# Patient Record
Sex: FEMALE | Race: WHITE | NOT HISPANIC OR LATINO | Employment: PART TIME | URBAN - METROPOLITAN AREA
[De-identification: names, ages, dates, MRNs, and addresses within clinical notes are randomized per-mention and may not be internally consistent; named-entity substitution may affect disease eponyms.]

---
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
## 2018-01-02 DIAGNOSIS — F41.1 GAD (GENERALIZED ANXIETY DISORDER): ICD-10-CM

## 2018-01-15 ENCOUNTER — OFFICE VISIT (OUTPATIENT)
Dept: PEDIATRICS | Facility: OTHER | Age: 18
End: 2018-01-15
Payer: MEDICAID

## 2018-01-15 VITALS
RESPIRATION RATE: 18 BRPM | HEART RATE: 94 BPM | SYSTOLIC BLOOD PRESSURE: 98 MMHG | WEIGHT: 144.5 LBS | HEIGHT: 64 IN | DIASTOLIC BLOOD PRESSURE: 52 MMHG | TEMPERATURE: 98.2 F | BODY MASS INDEX: 24.67 KG/M2

## 2018-01-15 DIAGNOSIS — R41.89 COGNITIVE IMPAIRMENT: ICD-10-CM

## 2018-01-15 DIAGNOSIS — F41.1 GAD (GENERALIZED ANXIETY DISORDER): Primary | ICD-10-CM

## 2018-01-15 DIAGNOSIS — F84.0 AUTISM SPECTRUM DISORDER: ICD-10-CM

## 2018-01-15 PROCEDURE — 99214 OFFICE O/P EST MOD 30 MIN: CPT | Performed by: PEDIATRICS

## 2018-01-15 RX ORDER — FLUOXETINE 10 MG/1
CAPSULE ORAL
Qty: 90 CAPSULE | Refills: 0 | Status: SHIPPED | OUTPATIENT
Start: 2018-01-15 | End: 2018-04-09

## 2018-01-15 RX ORDER — DOCUSATE SODIUM 100 MG/1
CAPSULE, LIQUID FILLED ORAL
COMMUNITY
Start: 2017-12-28 | End: 2019-04-03

## 2018-01-15 ASSESSMENT — PATIENT HEALTH QUESTIONNAIRE - PHQ9
SUM OF ALL RESPONSES TO PHQ QUESTIONS 1-9: 0
10. IF YOU CHECKED OFF ANY PROBLEMS, HOW DIFFICULT HAVE THESE PROBLEMS MADE IT FOR YOU TO DO YOUR WORK, TAKE CARE OF THINGS AT HOME, OR GET ALONG WITH OTHER PEOPLE: NOT DIFFICULT AT ALL
SUM OF ALL RESPONSES TO PHQ QUESTIONS 1-9: 0

## 2018-01-15 ASSESSMENT — ANXIETY QUESTIONNAIRES
3. WORRYING TOO MUCH ABOUT DIFFERENT THINGS: NEARLY EVERY DAY
5. BEING SO RESTLESS THAT IT IS HARD TO SIT STILL: NOT AT ALL
7. FEELING AFRAID AS IF SOMETHING AWFUL MIGHT HAPPEN: SEVERAL DAYS
GAD7 TOTAL SCORE: 12
1. FEELING NERVOUS, ANXIOUS, OR ON EDGE: NEARLY EVERY DAY
GAD7 TOTAL SCORE: 12
6. BECOMING EASILY ANNOYED OR IRRITABLE: SEVERAL DAYS
2. NOT BEING ABLE TO STOP OR CONTROL WORRYING: NEARLY EVERY DAY
GAD7 TOTAL SCORE: 12
4. TROUBLE RELAXING: SEVERAL DAYS
7. FEELING AFRAID AS IF SOMETHING AWFUL MIGHT HAPPEN: SEVERAL DAYS

## 2018-01-15 ASSESSMENT — PAIN SCALES - GENERAL: PAINLEVEL: NO PAIN (0)

## 2018-01-15 NOTE — PROGRESS NOTES
SUBJECTIVE:                                                      HPI:  Maren is a 17 year old female with autism with who presents to clinic today for follow-up of anxiety. Last visit: 9/22/17.     Overall, mom feels that Maren is doing well. Teachers have no specific concerns. She still struggles with excess worry. As soon as she gets on van to come home from school, there is almost constant texting to mom regarding the details for the evening including what is for dinner. She has an excessive hand washing. Sleeps well. No concern for depression.  Current behavioral therapy: none, looking for a provider closer than [place] and available in evenings.  Current coping strategies: coloring, beading, dancing in the house, DAPE and using tredmill.     Medication: Prozac (fluoxetine) 20 mg  Date medication first prescribed: 6/9/15. Tried weaning Prozac (fluoxetine)  10 mg over a month during the summer. Restarted due to moodiness and irritability. She was easily upset over little things. Restarted 10 mg, increased to 15 then to 20 mg 9/17.  Medication problems/concerns: none      ROS: Negative for constitutional, eye, ear, nose, throat, skin, respiratory, cardiac, and gastrointestinal other than those outlined in the HPI.    PROBLEM LIST:  Patient Active Problem List    Diagnosis Date Noted     Chronic rhinitis 11/12/2016     Priority: Medium     VANNESSA (generalized anxiety disorder) 07/09/2016     Priority: Medium     Constipation, unspecified constipation type 12/10/2015     Priority: Medium     Gross motor delay 04/06/2015     Priority: Medium     Cognitive impairment 07/06/2012     Priority: Medium     Menorrhagia 07/06/2012     Priority: Medium     Autism 02/28/2012     Priority: Medium      MEDICATIONS:  Current Outpatient Prescriptions   Medication Sig Dispense Refill      MG capsule        FLUoxetine (PROZAC) 20 MG capsule Take 1 capsule (20 mg) by mouth daily 14 capsule 0     FLUoxetine (PROZAC) 10 MG  "tablet Take 1 tab daily x 3 weeks. May decrease to 1/2 tab daily x 2 weeks, then 1/2 tab every other day until gone 35 tablet 0     desogestrel-ethinyl estradiol (APRI) 0.15-30 MG-MCG per tablet Take 1 tablet by mouth daily Skip placebo week except for every 3rd month 84 tablet 3     fluticasone (FLONASE) 50 MCG/ACT spray Spray 1-2 sprays into both nostrils daily 16 g 6     polyethylene glycol (MIRALAX) powder Take 17 g (1 capful) by mouth daily 527 g 11     docusate sodium (COLACE) 100 MG tablet Take 100 mg by mouth daily 90 tablet 3     Multiple Vitamins-Minerals (MULTIVITAL PO) Take  by mouth.        ALLERGIES:  No Known Allergies    Problem list and histories reviewed & adjusted, as indicated.    OBJECTIVE:                                                      BP 98/52  Pulse 94  Temp 98.2  F (36.8  C) (Temporal)  Resp 18  Ht 5' 4.37\" (1.635 m)  Wt 144 lb 8 oz (65.5 kg)  BMI 24.52 kg/m2  Physical Exam:  Appearance: in no apparent distress, well developed and well nourished, alert.  Heart: S1, S2 normal, no murmur, no gallop, rate regular.  Lungs: no retractions, clear to auscultation.   Skin: No cutting or lesions.    VANNESSA-7 SCORE 9/22/2017 1/15/2018   Total Score  (minimal anxiety) 12 (moderate anxiety)   Total Score - 12       PHQ-9 SCORE 9/22/2017 1/15/2018   Total Score MyChart 0 0   Total Score 0 0       ASSESSMENT/PLAN:     1. VANNESSA (generalized anxiety disorder)    2. Autism spectrum disorder    3. Cognitive impairment        Recommend therapy for skill building and emotion regulation skills. Suggested providers per Patient Instructions.    Will increase Prozac (fluoxetine) from 20 to 30 mg.    Reviewed importance of coping skills. Maren is able to name several adaptive coping skills including exercise, beading, coloring, witting in a journal, and listening to music.    Strongly encourage aerobic exercise.   Recheck in clinic in 3 months, sooner with concerns.       Patient's parent expresses " understanding and agreement with the plan and has no further questions.    Electronically signed by Leidy Carrizales MD.    Answers for HPI/ROS submitted by the patient on 1/15/2018   If you checked off any problems, how difficult have these problems made it for you to do your work, take care of things at home, or get along with other people?: Not difficult at all  PHQ9 TOTAL SCORE: 0  VANNESSA 7 TOTAL SCORE: 12

## 2018-01-15 NOTE — MR AVS SNAPSHOT
After Visit Summary   1/15/2018    Maren Winchester    MRN: [id number]           Patient Information     Date Of Birth          2000        Visit Information        Provider Department      1/15/2018 1:50 PM Leidy Carrizales MD [facility]        Today's Diagnoses     VANNESSA (generalized anxiety disorder)    -  1      Care Instructions    Recommendations in caring for Maren:    1. Recommend therapy for skill building and emotion regulation skills. Suggested providers per Patient Instructions.    2. Will increase Prozac (fluoxetine) from 20 to 30 mg.    3. Reviewed importance of coping skills. Maren is able to name several adaptive coping skills including exercise, beading, coloring, witting in a journal, and listening to music.    4. Recheck in clinic in 3 months, sooner with concerns.         [facility]- [phone]  [facility]) at [phone]  [facility]) - [phone]  [facility]  ([place]) - [phone]  [facility] ([place]) - [phone]   [facility]) - [phone]              Follow-ups after your visit        Who to contact     If you have questions or need follow up information about today's clinic visit or your schedule please contact [facility] directly at [phone].  Normal or non-critical lab and imaging results will be communicated to you by MyChart, letter or phone within 4 business days after the clinic has received the results. If you do not hear from us within 7 days, please contact the clinic through MyChart or phone. If you have a critical or abnormal lab result, we will notify you by phone as soon as possible.  Submit refill requests through Vingle or call your pharmacy and they will forward the refill request to us. Please allow 3 business days for your refill to be completed.          Additional Information About  "Your Visit        [employer] Pharm[employer] Information     L[employer] gives you secure access to your electronic health record. If you see a primary care provider, you can also send messages to your care team and make appointments. If you have questions, please call your primary care clinic.  If you do not have a primary care provider, please call [phone] and they will assist you.        Care EveryWhere ID     This is your Care EveryWhere ID. This could be used by other organizations to access your [place] medical records  [id number]        Your Vitals Were     Pulse Temperature Respirations Height BMI (Body Mass Index)       94 98.2  F (36.8  C) (Temporal) 18 5' 4.37\" (1.635 m) 24.52 kg/m2        Blood Pressure from Last 3 Encounters:   01/15/18 98/52   09/22/17 102/70   05/10/17 116/78    Weight from Last 3 Encounters:   01/15/18 144 lb 8 oz (65.5 kg) (76 %)*   09/22/17 145 lb (65.8 kg) (77 %)*   05/10/17 146 lb 8 oz (66.5 kg) (80 %)*     * Growth percentiles are based on [facility] 2-20 Years data.              Today, you had the following     No orders found for display         Today's Medication Changes          These changes are accurate as of: 1/15/18  2:33 PM.  If you have any questions, ask your nurse or doctor.               Start taking these medicines.        Dose/Directions    * FLUoxetine 20 MG capsule   Commonly known as:  PROzac   Used for:  VANNESSA (generalized anxiety disorder)   Started by:  Leidy Carrizales MD        Take 10 plus 20 for 30 mg daily   Quantity:  90 capsule   Refills:  0       * FLUoxetine 10 MG capsule   Commonly known as:  PROzac   Used for:  VANNESSA (generalized anxiety disorder)   Started by:  Leidy Carrizales MD        Take 10 plus 20 for 30 mg daily   Quantity:  90 capsule   Refills:  0       * Notice:  This list has 2 medication(s) that are the same as other medications prescribed for you. Read the directions carefully, and ask your doctor or other care provider to review them with you.         Where to " get your medicines      These medications were sent to [facility], [place] - [address]   [address] , [place] [address]     Phone:  [phone]     FLUoxetine 10 MG capsule    FLUoxetine 20 MG capsule                Primary Care Provider Office Phone # Fax #    Leidy Carrizales [phone]       [address]  [place] [address]        Equal Access to Services     [facility] : Hadii aad ku hadasho Soomaali, waaxda luqadaha, qaybta kaalmada adeegyada, waxay idiin hayaan adeeg khmagdalenash lafrancisco . So [facility] [phone].    ATENCIÓN: Si habla español, tiene a terry disposición servicios gratuitos de asistencia lingüística. [facility] [phone].    We comply with applicable federal civil rights laws and [place] laws. We do not discriminate on the basis of race, color, national origin, age, disability, sex, sexual orientation, or gender identity.            Thank you!     Thank you for choosing [facility]  for your care. Our goal is always to provide you with excellent care. Hearing back from our patients is one way we can continue to improve our services. Please take a few minutes to complete the written survey that you may receive in the mail after your visit with us. Thank you!             Your Updated Medication List - Protect others around you: Learn how to safely use, store and throw away your medicines at www.disposemymeds.org.          This list is accurate as of: 1/15/18  2:33 PM.  Always use your most recent med list.                   Brand Name Dispense Instructions for use Diagnosis    desogestrel-ethinyl estradiol 0.15-30 MG-MCG per tablet    APRI    84 tablet    Take 1 tablet by mouth daily Skip placebo week except for every 3rd month    Menorrhagia with regular cycle       * docusate sodium 100 MG tablet    COLACE    90 tablet    Take 100 mg by mouth daily    Constipation, unspecified constipation type       *  MG capsule    Generic drug:  docusate sodium           * FLUoxetine 20 MG capsule    PROzac    90 capsule    Take 10 plus 20 for 30 mg daily    VANNESSA (generalized anxiety disorder)       * FLUoxetine 10 MG capsule    PROzac    90 capsule    Take 10 plus 20 for 30 mg daily    VANNESSA (generalized anxiety disorder)       fluticasone 50 MCG/ACT spray    FLONASE    16 g    Spray 1-2 sprays into both nostrils daily    Snoring       MULTIVITAL PO      Take  by mouth.        polyethylene glycol powder    MIRALAX    527 g    Take 17 g (1 capful) by mouth daily    Constipation, unspecified constipation type       * Notice:  This list has 4 medication(s) that are the same as other medications prescribed for you. Read the directions carefully, and ask your doctor or other care provider to review them with you.

## 2018-01-15 NOTE — PATIENT INSTRUCTIONS
Recommendations in caring for Maren:    1. Recommend therapy for skill building and emotion regulation skills. Suggested providers per Patient Instructions.    2. Will increase Prozac (fluoxetine) from 20 to 30 mg.    3. Reviewed importance of coping skills. Maren is able to name several adaptive coping skills including exercise, beading, coloring, witting in a journal, and listening to music.    4. Recheck in clinic in 3 months, sooner with concerns.         [facility]- [phone]  [facility]) at [phone]  [facility]) - [phone]  [facility]  ([place]) - [phone]  [facility]) - [phone]   [facility]) - [phone]

## 2018-01-16 PROBLEM — F84.0 AUTISM SPECTRUM DISORDER: Status: ACTIVE | Noted: 2018-01-16

## 2018-01-16 ASSESSMENT — ANXIETY QUESTIONNAIRES: GAD7 TOTAL SCORE: 12

## 2018-01-16 ASSESSMENT — PATIENT HEALTH QUESTIONNAIRE - PHQ9: SUM OF ALL RESPONSES TO PHQ QUESTIONS 1-9: 0

## 2018-02-12 DIAGNOSIS — N92.0 MENORRHAGIA WITH REGULAR CYCLE: ICD-10-CM

## 2018-02-12 RX ORDER — DESOGESTREL AND ETHINYL ESTRADIOL 0.15-0.03
1 KIT ORAL DAILY
Qty: 84 TABLET | Refills: 3 | Status: SHIPPED | OUTPATIENT
Start: 2018-02-12 | End: 2018-10-22

## 2018-02-12 NOTE — TELEPHONE ENCOUNTER
"Requested Prescriptions   Pending Prescriptions Disp Refills     desogestrel-ethinyl estradiol (APRI) 0.15-30 MG-MCG per tablet 84 tablet 3     Sig: Take 1 tablet by mouth daily Skip placebo week except for every 3rd month    Contraceptives Protocol Passed    2/12/2018 10:06 AM       Passed - Patient is not a current smoker if age is 35 or older       Passed - Recent or future visit with authorizing provider's specialty    Patient had office visit in the last year or has a visit in the next 30 days with authorizing provider.  See \"Patient Info\" tab in inbasket, or \"Choose Columns\" in Meds & Orders section of the refill encounter.            Passed - No active pregnancy on record       Passed - No positive pregnancy test in past 12 months          Last Written Prescription Date:  5/10/17  Last Fill Quantity: 84,  # refills: 3   Last Office Visit with [facility], P or [facility] prescribing provider:  9-22-17   Future Office Visit:       "

## 2018-02-13 ENCOUNTER — OFFICE VISIT (OUTPATIENT)
Dept: PSYCHOLOGY | Facility: CLINIC | Age: 18
End: 2018-02-13
Payer: MEDICAID

## 2018-02-13 DIAGNOSIS — F41.1 GAD (GENERALIZED ANXIETY DISORDER): Primary | ICD-10-CM

## 2018-02-13 PROCEDURE — 90834 PSYTX W PT 45 MINUTES: CPT | Performed by: MARRIAGE & FAMILY THERAPIST

## 2018-02-13 NOTE — MR AVS SNAPSHOT
MRN:[id number]                      After Visit Summary   2/13/2018    Maren Winchester    MRN: [id number]           Visit Information        Provider Department      2/13/2018 2:00 PM Asale Bermeo, [facility]      Your next 10 appointments already scheduled     Mar 05, 2018  2:00 PM CST   Return Visit with Asael Bermeo [address] [address]   [phone]            Mar 12, 2018  4:00 PM CDT   Return Visit with Asael Bermeo [address] [id number] [phone]            Mar 19, 2018  4:00 PM CDT   Return Visit with Asael Bermeo [address] [id number] [phone]            Mar 26, 2018  4:00 PM CDT   Return Visit with Asael Bermeo [address] [id number] [phone]              MyChart Information     [employer] gives you secure access to your electronic health record. If you see a primary care provider, you can also send messages to your care team and make appointments. If you have questions, please call your primary care clinic.  If you do not have a primary care provider, please call [phone] and they will assist you.        Care EveryWhere ID     This is your Care EveryWhere ID. This could be used by other organizations to access your [facility] medical records  [id number]        Equal Access to Services     LUDWIN HEBERT : Shahana Simental, wacristobal mary, qapeter kaalsheila esquivel, baljit garcia. [facility] [phone].    ATENCIÓN: Si frances martel terry  disposición servicios gratuitos de asistencia lingüística. Llame al [phone].    We comply with applicable federal civil rights laws and [place] laws. We do not discriminate on the basis of race, color, national origin, age, disability, sex, sexual orientation, or gender identity.

## 2018-02-25 ASSESSMENT — ANXIETY QUESTIONNAIRES
5. BEING SO RESTLESS THAT IT IS HARD TO SIT STILL: NOT AT ALL
7. FEELING AFRAID AS IF SOMETHING AWFUL MIGHT HAPPEN: SEVERAL DAYS
2. NOT BEING ABLE TO STOP OR CONTROL WORRYING: NEARLY EVERY DAY
IF YOU CHECKED OFF ANY PROBLEMS ON THIS QUESTIONNAIRE, HOW DIFFICULT HAVE THESE PROBLEMS MADE IT FOR YOU TO DO YOUR WORK, TAKE CARE OF THINGS AT HOME, OR GET ALONG WITH OTHER PEOPLE: VERY DIFFICULT
6. BECOMING EASILY ANNOYED OR IRRITABLE: SEVERAL DAYS
1. FEELING NERVOUS, ANXIOUS, OR ON EDGE: NEARLY EVERY DAY
3. WORRYING TOO MUCH ABOUT DIFFERENT THINGS: NEARLY EVERY DAY
GAD7 TOTAL SCORE: 12

## 2018-02-25 ASSESSMENT — PATIENT HEALTH QUESTIONNAIRE - PHQ9: 5. POOR APPETITE OR OVEREATING: SEVERAL DAYS

## 2018-02-26 ASSESSMENT — ANXIETY QUESTIONNAIRES: GAD7 TOTAL SCORE: 12

## 2018-02-26 ASSESSMENT — PATIENT HEALTH QUESTIONNAIRE - PHQ9: SUM OF ALL RESPONSES TO PHQ QUESTIONS 1-9: 0

## 2018-02-26 NOTE — PROGRESS NOTES
Progress Note - Initial Session    Client Name:  Maren Winchester Date: 2/13/18         Service Type: Individual      Session Start Time: 2:10 pm  Session End Time: 3pm      Session Length: 38 - 52      Session #: 1     Attendees: Client and Mother         Diagnostic Assessment in progress.        Mental Status Assessment:  Appearance:   Appropriate   Eye Contact:   Poor  Psychomotor Behavior: Retarded (Slowed)   Attitude:   Guarded   Orientation:   All  Speech   Rate / Production: Monotone  Pressured    Volume:  Loud   Mood:    Anxious  Normal  Affect:    Constricted  Labile   Thought Content:  Clear  Perservative   Thought Form:  Coherent  Logical  Obsessive  Tangential   Insight:    Fair       Safety Issues and Plan for Safety and Risk Management:  Client denies current fears or concerns for personal safety.  Client denies current or recent suicidal ideation or behaviors.  Client denies current or recent homicidal ideation or behaviors.  Client denies current or recent self injurious behavior or ideation.  Client denies other safety concerns.  A safety and risk management plan has not been developed at this time, however client was given the after-hours number / 911 should there be a change in any of these risk factors.  Client reports there are firearms in the house. The firearms are secured in a locked space.      Diagnostic Criteria:  A. Excessive anxiety and worry about a number of events or activities (such as work or school performance).   B. The person finds it difficult to control the worry.  C. Select 3 or more symptoms (required for diagnosis). Only one item is required in children.   - Restlessness or feeling keyed up or on edge.    - Being easily fatigued.    - Difficulty concentrating or mind going blank.    - Irritability.    - Muscle tension.    - Sleep disturbance (difficulty falling or staying asleep, or restless unsatisfying sleep).   D. The focus of the anxiety and worry is not  confined to features of an Axis I disorder.  E. The anxiety, worry, or physical symptoms cause clinically significant distress or impairment in social, occupational, or other important areas of functioning.   F. The disturbance is not due to the direct physiological effects of a substance (e.g., a drug of abuse, a medication) or a general medical condition (e.g., hyperthyroidism) and does not occur exclusively during a Mood Disorder, a Psychotic Disorder, or a Pervasive Developmental Disorder.        DSM5 Diagnoses: (Sustained by DSM5 Criteria Listed Above)  Diagnoses: 300.02 (F41.1) Generalized Anxiety Disorder  Psychosocial & Contextual Factors: Diagnosed with autism at age 4. Mom is legal guardian. Anxiety and OCD issues.   WHODAS 2.0 (12 item)            This questionnaire asks about difficulties due to health conditions. Health conditions  include  disease or illnesses, other health problems that may be short or long lasting,  injuries, mental health or emotional problems, and problems with alcohol or drugs.                     Think back over the past 30 days and answer these questions, thinking about how much  difficulty you had doing the following activities. For each question, please [place] only  one response.    S1 Standing for long periods such as 30 minutes? None =         1   S2 Taking care of household responsibilities? None =         1   S3 Learning a new task, for example, learning how to get to a new place? Mild =           2   S4 How much of a problem do you have joining community activities (for example, festivals, Evangelical or other activities) in the same way as anyone else can? Mild =           2   S5 How much have you been emotionally affected by your health problems? Moderate =   3     In the past 30 days, how much difficulty did you have in:   S6 Concentrating on doing something for ten minutes? None =         1   S7 Walking a long distance such as a kilometer (or equivalent)? Mild =            2   S8 Washing your whole body? None =         1   S9 Getting dressed? None =         1   S10 Dealing with people you do not know? Moderate =   3   S11 Maintaining a friendship? None =         1   S12 Your day to day work? Mild =           2     H1 Overall, in the past 30 days, how many days were these difficulties present? Record number of days 30   H2 In the past 30 days, for how many days were you totally unable to carry out your usual activities or work because of any health condition? Record number of days  0   H3 In the past 30 days, not counting the days that you were totally unable, for how many days did you cut back or reduce your usual activities or work because of any health condition? Record number of days 0       Collateral Reports Completed:  Not Applicable      PLAN: (Homework, other):  Client stated that she may follow up for ongoing services with [facility].        CHAPITO Blankenship

## 2018-03-12 ENCOUNTER — OFFICE VISIT (OUTPATIENT)
Dept: PSYCHOLOGY | Facility: CLINIC | Age: 18
End: 2018-03-12
Payer: MEDICAID

## 2018-03-12 DIAGNOSIS — F41.1 GAD (GENERALIZED ANXIETY DISORDER): Primary | ICD-10-CM

## 2018-03-12 PROCEDURE — 90791 PSYCH DIAGNOSTIC EVALUATION: CPT | Performed by: MARRIAGE & FAMILY THERAPIST

## 2018-03-12 NOTE — Clinical Note
Hello, I will be working with this patient to address concerns of anxiety and obsessive thoughts. Let me know if you have questions or concerns. Thanks,

## 2018-03-12 NOTE — PROGRESS NOTES
Child / Adolescent Structured Interview  Standard Diagnostic Assessment    CLIENT'S NAME: Maren Winchester  MRN:   [id number]  :   2000  ACCT. NUMBER: [id number]  DATE OF SERVICE:  18  and 3/12/18      Identifying Information:  Client is a 17 year old,  female. Client was referred to therapy by physician. Client is currently a student.  This initial session included the client's mother. The client was present in the initial session.  There are no language or communication issues or need for modification in treatment. There are no ethnic, cultural or Jain factors that may be relevant for therapy. Client identified their preferred language to be English. Client does not need the assistance of an  or other support involved in therapy.      Client and Parent's Statements of Presenting Concern:  Client's mother reported the following reason(s) for seeking therapy: anxiety issues--obsessed with what's for dinner--likes finger foods, and lets old memories get her down. Client has autism spectrum disorder.   Client reported the reason for seeking therapy as anxiety.  her symptoms have resulted in the following functional impairments: academic performance, educational activities, management of the household and or completion of tasks, organization, relationship(s), self-care and social interactions      History of Presenting Concern:  The mother reports these concerns began 2 years ago.  Issues contributing to the current problem include: parent's divorce, minimal co-parenting relationship and bullying.  Client has attempted to resolve these concerns in the past through med increase. Client reports that other professional(s) are not involved in providing support services at this time.      Family and Social History:  Client grew up in [place], [place].  Parents  when the client was 3 years old. The client's mother did remarry 3 years ago  The client's father did not remarry and remains single. The client lives with mom and step-dad. The client has 2 siblings, includin brother(s) ages 15 and 1 sister(s) ages 20. They noted that they were the second born. The client's living situation appears to be stable, as evidenced by mom's report.  Client described her current relationships with family of origin as good.  There are no apparent family relationship issues.  The biological mother report the child shows affection by hugs.   Parent describes discipline used as taking things away.  Client describes discipline used as grounded.   The mother reports hours per week their child spends in the following:  Computer, smart phone or video games: too much TV: none The family uses blocking devices for computer, TV, or internet: YES.  How is electronics use monitored?  By mom. Other information reported by parent/child: n/a There are no identified legal issues. The biological mother has full legal custody and has full physical custody.      Developmental History:  There were no reported complications during pregnanacy or birth. There were no major childhood illnesses.  The caregiver reported that the client experienced significant delays in developmental tasks, such as diagnosed with autism age 4. . There is a significant history of separation from primary caregiver(s). Dad left age 3. There is not a history of trauma, loss or abuse. There are no reported problems with sleep.  There are no concerns about sexual development or acitivity. Client is not sexually active.      School Information:  The client currently attends school at [employer], and is in the 12 grade. There is a history of grade retention or special educational services. Particpation in special education services includes: IEP. There is not a history of ADHD symptoms. There is not a history of learning disorders. Academic performance is below grade level. There are no attendance issues. Client  identified few stable and meaningful social connections.  Peer relationships are problematic not many friends.      Mental Health History:  There is not reported family history of mental issues / treatment.    Client is not currently receiving any mental health services.  Client has received the following mental health services in the past: no prior services.  Hospitalizations: None.       Chemical Health History:  There is no reported family history of chemical health issues / treatment.    The client has the following history of chemical health issues / treatment: n/a.      The Kiddie-Cage score was 0    There are no recommendations for follow-up based on this score    Client's response to recommendations:  Not Applicable    Psychological and Social History Assessment / Questionnaire:  Over the past 2 weeks, mother reports their child had problems with the following:     Review of Symptoms:  Depression: No symptoms  Ramona:  No Symptoms  Psychosis: No Symptoms  Anxiety: Excessive worry, Nervousness, Social anxiety, Ruminations, Irritaiblity and Anger outbursts  Panic:  No symptoms  Post Traumatic Stress Disorder: No Symptoms  Obsessive Compulsive Disorder: Obsessions  Eating Disorder: No Symptoms   Oppositional Defiant Disorder:  No Symptoms  ADD / ADHD:  No symptoms  Conduct Disorder:No symptoms  Autism Spectrum Disorder: Deficits in social communication and social interactions, Deficits in developing, maintaining, and understanding relationships, Stereotyped or repetitive motor movements, use of objects, or speech, Deficits in social-emotional reciprocity, Inflexible adherence to routines, Highly restricted fixated interests that are abnormal in intensity or focus and Deficits in non-verbal communication behaviors used for social interaction    There was agreement between parent and child symptom report.       Safety Issues and Plan for Safety and Risk Management:    Client and Mother reports the client denies a  history of suicidal ideation, suicide attempts, self-injurious behavior, homicidal ideation, homicidal behavior and and other safety concerns    Client denies current fears or concerns for personal safety.  Client denies current or recent suicidal ideation or behaviors.  Client denies current or recent homicidal ideation or behaviors.  Client denies current or recent self injurious behavior or ideation.  Client denies other safety concerns.  Client reports there are firearms in the house. The firearms are secured in a locked space.     The client and mom were instructed to call [facility]'s crisis number and/or 911 if there should be a change in any of these risk factors.      Medical Information:  There are no current medical concerns.    Current medications are:   Current Outpatient Prescriptions   Medication Sig     desogestrel-ethinyl estradiol (APRI) 0.15-30 MG-MCG per tablet Take 1 tablet by mouth daily Skip placebo week except for every 3rd month      MG capsule      FLUoxetine (PROZAC) 20 MG capsule Take 10 plus 20 for 30 mg daily     FLUoxetine (PROZAC) 10 MG capsule Take 10 plus 20 for 30 mg daily     fluticasone (FLONASE) 50 MCG/ACT spray Spray 1-2 sprays into both nostrils daily     polyethylene glycol (MIRALAX) powder Take 17 g (1 capful) by mouth daily     docusate sodium (COLACE) 100 MG tablet Take 100 mg by mouth daily     Multiple Vitamins-Minerals (MULTIVITAL PO) Take  by mouth.     No current facility-administered medications for this visit.          Therapist verified client's current medications as listed above.  The biological mother do not report concerns about client's medication adherence.       No Known Allergies  Therapist verified client allergies as listed above.    Client has had a physical exam to rule out medical causes for current symptoms. Date of last physical exam was within the past year. Client was encouraged to follow up with PCP if symptoms were to develop. The client has a  [place] Primary Care Provider, who is named Leidy Carrizales.. The client reports not having a psychiatrist.    There are no reported issues of chronic or episodic pain.  There are no current nutritional or weight concerns.  There are no concerns with vision or hearing.      Mental Status Assessment:  Appearance:   Appropriate   Eye Contact:   Poor  Psychomotor Behavior: Normal   Attitude:   Cooperative   Orientation:   All  Speech   Rate / Production: Hyperverbal  Pressured    Volume:  Loud   Mood:    Normal  Affect:    Appropriate   Thought Content:  Magical Ideations  Perservative  Rumination   Thought Form:  Obsessive  Tangential   Insight:    Poor         Diagnostic Criteria:  A. Excessive anxiety and worry about a number of events or activities (such as work or school performance).   B. The person finds it difficult to control the worry.  C. Select 3 or more symptoms (required for diagnosis). Only one item is required in children.   - Restlessness or feeling keyed up or on edge.    - Being easily fatigued.    - Difficulty concentrating or mind going blank.    - Irritability.    - Muscle tension.    - Sleep disturbance (difficulty falling or staying asleep, or restless unsatisfying sleep).   D. The focus of the anxiety and worry is not confined to features of an Axis I disorder.  E. The anxiety, worry, or physical symptoms cause clinically significant distress or impairment in social, occupational, or other important areas of functioning.   F. The disturbance is not due to the direct physiological effects of a substance (e.g., a drug of abuse, a medication) or a general medical condition (e.g., hyperthyroidism) and does not occur exclusively during a Mood Disorder, a Psychotic Disorder, or a Pervasive Developmental Disorder.    - The aformentioned symptoms began 2 year(s) ago and occurs 5 days per week and is experienced as moderate.    Patient's Strengths and Limitations:  Client strengths or resources that  will help her succeed in counseling are:family support and positive school connection  Client limitations that may interfere with success in counseling:lack of social support and parent conflict .      Functional Status:  Client's symptoms are causing reduced functional status in the following areas: Academics / Education - -  Activities of Daily Living - -  Social / Relational - -      DSM5 Diagnoses: (Sustained by DSM5 Criteria Listed Above)  Diagnoses: 300.02 (F41.1) Generalized Anxiety Disorder  Psychosocial & Contextual Factors: Diagnosed with autism at age 4. Mom is legal guardian. Anxiety and OCD issues.     Preliminary Treatment Plan:    The client reports no currently identified Shinto, ethnic or cultural issues relevant to therapy.     services are not indicated.    Modifications to assist communication are not indicated.    The concerns identified by the client will be addressed in therapy.    Initial Treatment will focus on: Anxiety     As a preliminary treatment goal, client will experience a reduction in anxiety.    The focus of initial interventions will be to alleviate anxiety.    Collaboration with other professionals is not indicated at this time.    Referral to another professional/service is not indicated at this time..      A Release of Information is not needed at this time.    Report to child / adult protection services was NA.    Client will have access to their [facility]' medical record.    CHAPITO Blankenship  March 12, 2018

## 2018-03-26 ENCOUNTER — OFFICE VISIT (OUTPATIENT)
Dept: PSYCHOLOGY | Facility: CLINIC | Age: 18
End: 2018-03-26
Payer: MEDICAID

## 2018-03-26 DIAGNOSIS — F41.1 GAD (GENERALIZED ANXIETY DISORDER): Primary | ICD-10-CM

## 2018-03-26 PROCEDURE — 90834 PSYTX W PT 45 MINUTES: CPT | Performed by: MARRIAGE & FAMILY THERAPIST

## 2018-03-26 NOTE — PROGRESS NOTES
Progress Note    Client Name: Maren Winchester  Date: 3/26/18         Service Type: Individual      Session Start Time: 3:45 am  Session End Time: 4:30 am      Session Length: 45 minutes     Session #: 3     Attendees: Client attended alone    Treatment Plan Last Reviewed: today  PHQ-9 / VANNESSA-7 : 0 / 12     DATA      Progress Since Last Session (Related to Symptoms / Goals / Homework):   Symptoms: Stable    Homework: n/a      Episode of Care Goals: Minimal progress - PREPARATION (Decided to change - considering how); Intervened by negotiating a change plan and determining options / strategies for behavior change, identifying triggers, exploring social supports, and working towards setting a date to begin behavior change     Current / Ongoing Stressors and Concerns:   Diagnosed with autism at age 4. Mom is legal guardian. Anxiety and OCD issues.      Treatment Objective(s) Addressed in This Session:   use cognitive strategies identified in therapy to challenge anxious thoughts       Intervention:   Created treatment plan with client        ASSESSMENT: Current Emotional / Mental Status (status of significant symptoms):   Risk status (Self / Other harm or suicidal ideation)   Client denies current fears or concerns for personal safety.   Client denies current or recent suicidal ideation or behaviors.   Client denies current or recent homicidal ideation or behaviors.   Client denies current or recent self injurious behavior or ideation.   Client denies other safety concerns.   A safety and risk management plan has not been developed at this time, however client was given the after-hours number / 911 should there be a change in any of these risk factors.     Appearance:   Appropriate    Eye Contact:   Poor   Psychomotor Behavior: Normal    Attitude:   Cooperative    Orientation:   All   Speech    Rate / Production: Pressured     Volume:  Normal     Mood:    Normal   Affect:    Appropriate    Thought Content:  Clear    Thought Form:  Obsessive  Tangential    Insight:    Poor      Medication Review:   No changes to current psychiatric medication(s)     Medication Compliance:   Yes     Changes in Health Issues:   None reported     Chemical Use Review:   Substance Use: Chemical use reviewed, no active concerns identified      Tobacco Use: No current tobacco use.       Collateral Reports Completed:   Not Applicable    PLAN: (Client Tasks / Therapist Tasks / Other)  Build rapport with client.         CHAPITO Blankenship                                                         ________________________________________________________________________    Treatment Plan    Client's Name: Maren Winchester  YOB: 2000    Date: 3/26/18    DSM-V Diagnoses: 300.02 (F41.1) Generalized Anxiety Disorder  Psychosocial / Contextual Factors: Diagnosed with autism at age 4. Mom is legal guardian. Anxiety and OCD issues.   WHODAS: n/a    Referral / Collaboration:  Referral to another professional/service is not indicated at this time..    Anticipated number of session or this episode of care: 8-12      MeasurableTreatment Goal(s) related to diagnosis / functional impairment(s)  Goal 1: Client will learn healthy ways to manage anxiety.    I will know I've met my goal when I can not get so angry.      Objective #A (Client Action)    Client will use cognitive strategies identified in therapy to challenge anxious thoughts.  Status: New - Date: 3/26/18     Intervention(s)  Therapist will teach emotional regulation skills. -.        Client has reviewed and agreed to the above plan.      CHAPITO Blankenship  March 26, 2018

## 2018-03-26 NOTE — MR AVS SNAPSHOT
MRN:[id number]                      After Visit Summary   3/26/2018    Maren Winchester    MRN: [id number]           Visit Information        Provider Department      3/26/2018 4:00 PM Asael Bermeo LMFT [facility] Generic      Your next 10 appointments already scheduled     Apr 02, 2018 11:00 AM CDT   Return Visit with CHAPITO Gaytan   [address] [address]   [phone]            Apr 09, 2018  5:00 PM CDT   Return Visit with CHAPITO Gaytan   [facility] ([address] [phone] [phone]            Apr 16, 2018  3:00 PM CDT   Return Visit with CHAPITO Gaytan   [facility] ([address] [address] [phone]              MyChart Information     [employer] gives you secure access to your electronic health record. If you see a primary care provider, you can also send messages to your care team and make appointments. If you have questions, please call your primary care clinic.  If you do not have a primary care provider, please call [phone] and they will assist you.        Care EveryWhere ID     This is your Care EveryWhere ID. This could be used by other organizations to access your [place] medical records  [id number]        Equal Access to Services     LUDWIN HEBERT AH: Hadii aad ku hadasho Soomaali, waaxda luqadaha, qaybta kaalmada adeegyada, waxdeandre clayton garcia. So [facility] [phone].    ATENCIÓN: Si habla español, tiene a terry disposición servicios gratuitos de asistencia lingüística. Llame al [phone].    We comply with applicable federal civil rights laws and [place] laws. We do not discriminate on the basis of race, color, national  origin, age, disability, sex, sexual orientation, or gender identity.

## 2018-04-02 ENCOUNTER — OFFICE VISIT (OUTPATIENT)
Dept: PSYCHOLOGY | Facility: CLINIC | Age: 18
End: 2018-04-02
Payer: MEDICAID

## 2018-04-02 DIAGNOSIS — F41.1 GAD (GENERALIZED ANXIETY DISORDER): Primary | ICD-10-CM

## 2018-04-02 PROCEDURE — 90834 PSYTX W PT 45 MINUTES: CPT | Performed by: MARRIAGE & FAMILY THERAPIST

## 2018-04-02 NOTE — PROGRESS NOTES
Progress Note    Client Name: Maren Winchester  Date: 4/2/18         Service Type: Individual      Session Start Time: 11:01 am  Session End Time: 11:46 am      Session Length: 45 minutes     Session #: 4     Attendees: Client attended alone    Treatment Plan Last Reviewed: 3/26/18  PHQ-9 / VANNESSA-7 : 0 / 12     DATA      Progress Since Last Session (Related to Symptoms / Goals / Homework):   Symptoms: Stable; ongoing OCD about what's for supper, feeling tired, conflict with siblings. Worried about bio dad who just had surgery.     Homework: none given      Episode of Care Goals: Minimal progress - PREPARATION (Decided to change - considering how); Intervened by negotiating a change plan and determining options / strategies for behavior change, identifying triggers, exploring social supports, and working towards setting a date to begin behavior change     Current / Ongoing Stressors and Concerns:   Diagnosed with autism at age 4. Mom is legal guardian. Anxiety and OCD issues.      Treatment Objective(s) Addressed in This Session:   use cognitive strategies identified in therapy to challenge anxious thoughts       Intervention:   Identified stressors and triggers for anxiety, anger, and sadness (brother scaring her, unfairness, mom telling her to stop texting about whats for dinner, arguments with step-dad, bio dad's health, watching sad videos). Explored strategies for calming herself down when distressed (being alone in bedroom listening to music or watching videos, making jewelery for people, drawing and coloring, relaxing by sitting or laying down).         ASSESSMENT: Current Emotional / Mental Status (status of significant symptoms):   Risk status (Self / Other harm or suicidal ideation)   Client denies current fears or concerns for personal safety.   Client denies current or recent suicidal ideation or behaviors.   Client denies current or recent homicidal ideation or  behaviors.   Client denies current or recent self injurious behavior or ideation.   Client denies other safety concerns.   A safety and risk management plan has not been developed at this time, however client was given the after-hours number / 911 should there be a change in any of these risk factors.     Appearance:   Appropriate    Eye Contact:   Poor   Psychomotor Behavior: Normal    Attitude:   Cooperative    Orientation:   All   Speech    Rate / Production: Normal     Volume:  Normal    Mood:    Normal   Affect:    Appropriate    Thought Content:  Clear    Thought Form:  Coherent  Logical    Insight:    Poor      Medication Review:   No changes to current psychiatric medication(s)     Medication Compliance:   Yes     Changes in Health Issues:   None reported     Chemical Use Review:   Substance Use: Chemical use reviewed, no active concerns identified      Tobacco Use: No current tobacco use.       Collateral Reports Completed:   Not Applicable    PLAN: (Client Tasks / Therapist Tasks / Other)  Identify triggers for emotional distress and practice/learn relaxation strategies.         CHAPITO Blankenship                                                         ________________________________________________________________________    Treatment Plan    Client's Name: Maren Winchester  YOB: 2000    Date: 3/26/18    DSM-V Diagnoses: 300.02 (F41.1) Generalized Anxiety Disorder  Psychosocial / Contextual Factors: Diagnosed with autism at age 4. Mom is legal guardian. Anxiety and OCD issues.   WHODAS: n/a    Referral / Collaboration:  Referral to another professional/service is not indicated at this time..    Anticipated number of session or this episode of care: 8-12      MeasurableTreatment Goal(s) related to diagnosis / functional impairment(s)  Goal 1: Client will learn healthy ways to manage anxiety.    I will know I've met my goal when I can not get so angry.      Objective #A (Client  Action)    Client will use cognitive strategies identified in therapy to challenge anxious thoughts.  Status: New - Date: 3/26/18     Intervention(s)  Therapist will teach emotional regulation skills. -.        Client has reviewed and agreed to the above plan.      CHAPITO Blankenship  March 26, 2018

## 2018-04-02 NOTE — MR AVS SNAPSHOT
MRN:[id number]                      After Visit Summary   4/2/2018    Maren Winchester    MRN: [id number]           Visit Information        Provider Department      4/2/2018 11:00 AM Asael Bermeo LMFT [facility] Generic      Your next 10 appointments already scheduled     Apr 09, 2018  5:00 PM CDT   Return Visit with CHAPITO Gaytan   [facility] ([address] [phone]            Apr 16, 2018  3:00 PM CDT   Return Visit with Asael CHAPITO Arora   [facility] ([address] [phone]              MyChart Information     [employer] gives you secure access to your electronic health record. If you see a primary care provider, you can also send messages to your care team and make appointments. If you have questions, please call your primary care clinic.  If you do not have a primary care provider, please call [phone] and they will assist you.        Care EveryWhere ID     This is your Care EveryWhere ID. This could be used by other organizations to access your [place] medical records  [id number]        Equal Access to Services     LUDWIN HEBERT : Hadii chucky pabloo Somaddy, waaxda luqadaha, qaybta kaalmada adeegyada, baljit garcia. So [facility] [phone].    ATENCIÓN: Si habla español, tiene a terry disposición servicios gratuitos de asistencia lingüística. shawanda al [phone].    We comply with applicable federal civil rights laws and [place] laws. We do not discriminate on the basis of race, color, national origin, age, disability, sex, sexual orientation, or gender identity.

## 2018-04-09 ENCOUNTER — OFFICE VISIT (OUTPATIENT)
Dept: PSYCHOLOGY | Facility: CLINIC | Age: 18
End: 2018-04-09
Payer: MEDICAID

## 2018-04-09 DIAGNOSIS — F41.1 GAD (GENERALIZED ANXIETY DISORDER): ICD-10-CM

## 2018-04-09 DIAGNOSIS — F41.1 GAD (GENERALIZED ANXIETY DISORDER): Primary | ICD-10-CM

## 2018-04-09 PROCEDURE — 90834 PSYTX W PT 45 MINUTES: CPT | Performed by: MARRIAGE & FAMILY THERAPIST

## 2018-04-09 NOTE — MR AVS SNAPSHOT
MRN:[id number]                      After Visit Summary   4/9/2018    Maren Winchester    MRN: [id number]           Visit Information        Provider Department      4/9/2018 5:00 PM Asael Bermeo LMFT [facility] Generic      Your next 10 appointments already scheduled     Apr 16, 2018  3:00 PM CDT   Return Visit with CHAPITO Gaytan   [facility] ([facility])    [address] [place] [address] [phone]              MyChart Information     [employer] gives you secure access to your electronic health record. If you see a primary care provider, you can also send messages to your care team and make appointments. If you have questions, please call your primary care clinic.  If you do not have a primary care provider, please call [phone] and they will assist you.        Care EveryWhere ID     This is your Care EveryWhere ID. This could be used by other organizations to access your [place] medical records  [id number]        Equal Access to Services     LUDWIN HEBERT : Hadii aad ku hadasho Somaddy, waaxda luqadaha, qaybta kaalmada adeegtimothy, baljit garcia. So [facility] [phone].    ATENCIÓN: Si habla español, tiene a terry disposición servicios gratuitos de asistencia lingüística. Llame al [phone].    We comply with applicable federal civil rights laws and [place] laws. We do not discriminate on the basis of race, color, national origin, age, disability, sex, sexual orientation, or gender identity.

## 2018-04-09 NOTE — PROGRESS NOTES
Progress Note    Client Name: Maren Winchester  Date: 4/9/18         Service Type: Individual      Session Start Time: 5:03 pm  Session End Time: 5:48 pm      Session Length: 45 minutes     Session #: 5     Attendees: Client attended alone    Treatment Plan Last Reviewed: 3/26/18  PHQ-9 / VANNESSA-7 : 0 / 12     DATA      Progress Since Last Session (Related to Symptoms / Goals / Homework):   Symptoms: Stable    Homework: none given      Episode of Care Goals: Minimal progress - PREPARATION (Decided to change - considering how); Intervened by negotiating a change plan and determining options / strategies for behavior change, identifying triggers, exploring social supports, and working towards setting a date to begin behavior change     Current / Ongoing Stressors and Concerns:   Diagnosed with autism at age 4. Mom is legal guardian. Anxiety and OCD issues.      Treatment Objective(s) Addressed in This Session:   use cognitive strategies identified in therapy to challenge anxious thoughts       Intervention:   Talked about upcoming visit with bio dad, upcoming graduation, and dynamics at home with her family. Processed conflict with peer at school. Explored client's feelings and coping skills around nightmares.         ASSESSMENT: Current Emotional / Mental Status (status of significant symptoms):   Risk status (Self / Other harm or suicidal ideation)   Client denies current fears or concerns for personal safety.   Client denies current or recent suicidal ideation or behaviors.   Client denies current or recent homicidal ideation or behaviors.   Client denies current or recent self injurious behavior or ideation.   Client denies other safety concerns.   A safety and risk management plan has not been developed at this time, however client was given the after-hours number / 911 should there be a change in any of these risk factors.     Appearance:   Appropriate    Eye  Contact:   Poor   Psychomotor Behavior: Normal    Attitude:   Cooperative    Orientation:   All   Speech    Rate / Production: Normal     Volume:  Normal    Mood:    Normal   Affect:    Appropriate    Thought Content:  Clear    Thought Form:  Coherent  Logical    Insight:    Poor      Medication Review:   No changes to current psychiatric medication(s)     Medication Compliance:   Yes     Changes in Health Issues:   None reported     Chemical Use Review:   Substance Use: Chemical use reviewed, no active concerns identified      Tobacco Use: No current tobacco use.       Collateral Reports Completed:   Not Applicable    PLAN: (Client Tasks / Therapist Tasks / Other)  Identify triggers for emotional distress and practice/learn relaxation strategies.         CHAPITO Blankenship                                                         ________________________________________________________________________    Treatment Plan    Client's Name: Maren Winchester  YOB: 2000    Date: 3/26/18    DSM-V Diagnoses: 300.02 (F41.1) Generalized Anxiety Disorder  Psychosocial / Contextual Factors: Diagnosed with autism at age 4. Mom is legal guardian. Anxiety and OCD issues.   WHODAS: n/a    Referral / Collaboration:  Referral to another professional/service is not indicated at this time..    Anticipated number of session or this episode of care: 8-12      MeasurableTreatment Goal(s) related to diagnosis / functional impairment(s)  Goal 1: Client will learn healthy ways to manage anxiety.    I will know I've met my goal when I can not get so angry.      Objective #A (Client Action)    Client will use cognitive strategies identified in therapy to challenge anxious thoughts.  Status: New - Date: 3/26/18     Intervention(s)  Therapist will teach emotional regulation skills. -.        Client has reviewed and agreed to the above plan.      CHAPITO Blankenship  March 26, 2018

## 2018-04-09 NOTE — TELEPHONE ENCOUNTER
"Last Written Prescription Date:  1/15/2018  Last Fill Quantity: 90,  # refills: 0   Last office visit: 1/15/2018 with prescribing provider:  Leidy Salazar Office Visit:   Next 5 appointments (look out 90 days)     Apr 09, 2018  5:00 PM CDT   Return Visit with Asael Bermeo [address] [address]   [phone]            Apr 16, 2018  3:00 PM CDT   Return Visit with Asael Bermeo [address] [address]   [phone]                 Requested Prescriptions   Pending Prescriptions Disp Refills     FLUoxetine (PROZAC) 10 MG capsule 90 capsule 0     Sig: Take 10 plus 20 for 30 mg daily    SSRIs Protocol Passed    4/9/2018 10:44 AM       Passed - Recent (12 mo) or future (30 days) visit within the authorizing provider's specialty    Patient had office visit in the last 12 months or has a visit in the next 30 days with authorizing provider or within the authorizing provider's specialty.  See \"Patient Info\" tab in inbasket, or \"Choose Columns\" in Meds & Orders section of the refill encounter.           Passed - Patient is age 18 or older       Passed - No active pregnancy on record       Passed - No positive pregnancy test in last 12 months          "

## 2018-04-09 NOTE — TELEPHONE ENCOUNTER
"Last Written Prescription Date:  1/15/2018  Last Fill Quantity: 90,  # refills: 0   Last office visit: 9/22/2017 with prescribing provider:  Leidy Salazar Office Visit:   Next 5 appointments (look out 90 days)     Apr 09, 2018  5:00 PM CDT   Return Visit with Asael Bermeo [address] [address]   [phone]            Apr 16, 2018  3:00 PM CDT   Return Visit with Asael Bermeo [address] [address]   [phone]                 Requested Prescriptions   Pending Prescriptions Disp Refills     FLUoxetine (PROZAC) 20 MG capsule 90 capsule 0     Sig: Take 10 plus 20 for 30 mg daily    SSRIs Protocol Passed    4/9/2018 10:45 AM       Passed - Recent (12 mo) or future (30 days) visit within the authorizing provider's specialty    Patient had office visit in the last 12 months or has a visit in the next 30 days with authorizing provider or within the authorizing provider's specialty.  See \"Patient Info\" tab in inbasket, or \"Choose Columns\" in Meds & Orders section of the refill encounter.           Passed - Patient is age 18 or older       Passed - No active pregnancy on record       Passed - No positive pregnancy test in last 12 months          "

## 2018-04-10 RX ORDER — FLUOXETINE 10 MG/1
CAPSULE ORAL
Qty: 90 CAPSULE | Refills: 0 | Status: SHIPPED | OUTPATIENT
Start: 2018-04-10 | End: 2018-07-09

## 2018-04-16 ENCOUNTER — OFFICE VISIT (OUTPATIENT)
Dept: PSYCHOLOGY | Facility: CLINIC | Age: 18
End: 2018-04-16
Payer: MEDICAID

## 2018-04-16 DIAGNOSIS — F41.1 GAD (GENERALIZED ANXIETY DISORDER): Primary | ICD-10-CM

## 2018-04-16 PROCEDURE — 90834 PSYTX W PT 45 MINUTES: CPT | Performed by: MARRIAGE & FAMILY THERAPIST
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
## 2018-04-16 NOTE — MR AVS SNAPSHOT
MRN:[id number]                      After Visit Summary   4/16/2018    Maren Winchester    MRN: [id number]           Visit Information        Provider Department      4/16/2018 3:00 PM Asael Bermeo LMFT [facility] Generic      Your next 10 appointments already scheduled     May 14, 2018  4:00 PM CDT   Return Visit with CHAPITO Gaytan   [address] [phone] [phone]            May 21, 2018  4:00 PM CDT   [facility] ([address] [phone] [phone]            May 31, 2018  4:00 PM CDT   Return Visit with CHAPITO Gaytan   [facility] ([address] [phone] [phone]              MyChart Information     [employer] gives you secure access to your electronic health record. If you see a primary care provider, you can also send messages to your care team and make appointments. If you have questions, please call your primary care clinic.  If you do not have a primary care provider, please call [phone] and they will assist you.        Care EveryWhere ID     This is your Care EveryWhere ID. This could be used by other organizations to access your [place] medical records  [id number]        Equal Access to Services     LUDWIN HEBERT AH: Hadii aad ku hadasho Soomaali, waaxda luqadaha, qaybta kaalmada adeegyada, waxdeandre clayton garcia. So [facility] [phone].    ATENCIÓN: Si habla español, tiene a terry disposición servicios gratuitos de asistencia lingüística. Llame al [phone].    We comply with applicable federal civil rights laws and [place] laws. We do not discriminate on the basis of race, color, national  origin, age, disability, sex, sexual orientation, or gender identity.

## 2018-05-14 ENCOUNTER — OFFICE VISIT (OUTPATIENT)
Dept: PSYCHOLOGY | Facility: CLINIC | Age: 18
End: 2018-05-14
Payer: MEDICAID

## 2018-05-14 DIAGNOSIS — F41.1 GAD (GENERALIZED ANXIETY DISORDER): Primary | ICD-10-CM

## 2018-05-14 PROCEDURE — 90834 PSYTX W PT 45 MINUTES: CPT | Performed by: MARRIAGE & FAMILY THERAPIST

## 2018-05-14 NOTE — MR AVS SNAPSHOT
MRN:[id number]                      After Visit Summary   5/14/2018    Maren Winchester    MRN: [id number]           Visit Information        Provider Department      5/14/2018 4:00 PM Asael Bermeo LMFT [facility] Generic      Your next 10 appointments already scheduled     May 21, 2018  4:00 PM CDT   Return Visit with CHAPITO Gaytan   [facility] ([address] [phone]            May 31, 2018  4:00 PM CDT   Return Visit with Asaelmaliha Trujillo CHAPITO José   [facility] ([address] [phone] [phone]              MyChart Information     [employer] gives you secure access to your electronic health record. If you see a primary care provider, you can also send messages to your care team and make appointments. If you have questions, please call your primary care clinic.  If you do not have a primary care provider, please call [phone] and they will assist you.        Care EveryWhere ID     This is your Care EveryWhere ID. This could be used by other organizations to access your [place] medical records  [id number]        Equal Access to Services     LUDWIN HEBERT : Hadii chucky pabloo Somaddy, waaxda luqadaha, qaybta kaalmada adeegyada, baljit garcia. So [facility] [phone].    ATENCIÓN: Si habla español, tiene a terry disposición servicios gratuitos de asistencia lingüística. shawanda al [phone].    We comply with applicable federal civil rights laws and [place] laws. We do not discriminate on the basis of race, color, national origin, age, disability, sex, sexual orientation, or gender identity.

## 2018-05-15 NOTE — PROGRESS NOTES
Progress Note    Client Name: Maren Winchester  Date: 5/14/18         Service Type: Individual      Session Start Time: 4:05 pm  Session End Time: 4:57 pm      Session Length: 52 minutes     Session #: 7     Attendees: Client and Mother    Treatment Plan Last Reviewed: 3/26/18  PHQ-9 / VANNESSA-7 : 0 / 12     DATA      Progress Since Last Session (Related to Symptoms / Goals / Homework):   Symptoms: Worsening    Homework: Did not complete      Episode of Care Goals: No improvement - CONTEMPLATION (Considering change and yet undecided); Intervened by assessing the negative and positive thinking (ambivalence) about behavior change     Current / Ongoing Stressors and Concerns:   Diagnosed with autism at age 4. Mom is legal guardian. Anxiety and OCD issues.      Treatment Objective(s) Addressed in This Session:   use cognitive strategies identified in therapy to challenge anxious thoughts       Intervention:  Met with mom and client to process incident with client texting everyone in the middle of the night and then losing phone for 2 days. Taught parenting skills to mom and explored the nature of anxiety with client.         ASSESSMENT: Current Emotional / Mental Status (status of significant symptoms):   Risk status (Self / Other harm or suicidal ideation)   Client denies current fears or concerns for personal safety.   Client denies current or recent suicidal ideation or behaviors.   Client denies current or recent homicidal ideation or behaviors.   Client denies current or recent self injurious behavior or ideation.   Client denies other safety concerns.   A safety and risk management plan has not been developed at this time, however client was given the after-hours number / 911 should there be a change in any of these risk factors.     Appearance:   Appropriate    Eye Contact:   Poor   Psychomotor Behavior: Normal    Attitude:   Cooperative     Orientation:   All   Speech    Rate / Production: Normal     Volume:  Normal    Mood:    Normal   Affect:    Appropriate    Thought Content:  Clear  Magical Ideations    Thought Form:  Coherent  Logical    Insight:    Poor      Medication Review:   No changes to current psychiatric medication(s)     Medication Compliance:   Yes     Changes in Health Issues:   None reported     Chemical Use Review:   Substance Use: Chemical use reviewed, no active concerns identified      Tobacco Use: No current tobacco use.       Collateral Reports Completed:   Not Applicable    PLAN: (Client Tasks / Therapist Tasks / Other)  Continued: Parents will implement new schedule/routine around meal planning so that client won't need to ask so often about what's for dinner. Parents will be more involved in the therapy sessions.         CHAPITO Blankenship                                                         ________________________________________________________________________    Treatment Plan    Client's Name: Maren Winchester  YOB: 2000    Date: 3/26/18    DSM-V Diagnoses: 300.02 (F41.1) Generalized Anxiety Disorder  Psychosocial / Contextual Factors: Diagnosed with autism at age 4. Mom is legal guardian. Anxiety and OCD issues.   WHODAS: n/a    Referral / Collaboration:  Referral to another professional/service is not indicated at this time..    Anticipated number of session or this episode of care: 8-12      MeasurableTreatment Goal(s) related to diagnosis / functional impairment(s)  Goal 1: Client will learn healthy ways to manage anxiety.    I will know I've met my goal when I can not get so angry.      Objective #A (Client Action)    Client will use cognitive strategies identified in therapy to challenge anxious thoughts.  Status: New - Date: 3/26/18     Intervention(s)  Therapist will teach emotional regulation skills. -.        Client has reviewed and agreed to the above plan.      CHAPITO Blankenship  March  26, 2018

## 2018-05-31 ENCOUNTER — OFFICE VISIT (OUTPATIENT)
Dept: PSYCHOLOGY | Facility: CLINIC | Age: 18
End: 2018-05-31
Payer: MEDICAID

## 2018-05-31 DIAGNOSIS — F41.1 GAD (GENERALIZED ANXIETY DISORDER): Primary | ICD-10-CM

## 2018-05-31 PROCEDURE — 90834 PSYTX W PT 45 MINUTES: CPT | Performed by: MARRIAGE & FAMILY THERAPIST

## 2018-05-31 NOTE — MR AVS SNAPSHOT
MRN:[id number]                      After Visit Summary   5/31/2018    Maren Winchester    MRN: [id number]           Visit Information        Provider Department      5/31/2018 4:00 PM Asael Bermeo LMFT [facility] Generic      Your next 10 appointments already scheduled     Jun 07, 2018  1:00 PM CDT   [facility] Return with CHAPITO Gaytna   [address] [address]   [phone]            Jun 14, 2018 11:00 AM CDT   [facility]O [facility]tan   [address] [address]   [phone]              MyChart Information     [employer] gives you secure access to your electronic health record. If you see a primary care provider, you can also send messages to your care team and make appointments. If you have questions, please call your primary care clinic.  If you do not have a primary care provider, please call [phone] and they will assist you.        Care EveryWhere ID     This is your Care EveryWhere ID. This could be used by other organizations to access your [place] medical records  [id number]        Equal Access to Services     LUDWIN HEBERT : Hadii aad ku hadasho Socaydenali, waaxda luqadaha, qaybta kaalmada adebetsy, baljit garcia. So [facility] [phone].    ATENCIÓN: Si habla español, tiene a terry disposición servicios gratuitos de asistencia lingüística. Llame al [phone].    We comply with applicable federal civil rights laws and [place] laws. We do not discriminate on the basis of race, color, national origin, age, disability, sex, sexual orientation, or gender identity.

## 2018-06-03 NOTE — PROGRESS NOTES
Progress Note    Client Name: Maren Winchester  Date: 5/31/18         Service Type: Individual      Session Start Time: 4:05 pm  Session End Time: 4:57 pm      Session Length: 52 minutes     Session #: 8     Attendees: Client attended alone    Treatment Plan Last Reviewed: 3/26/18  PHQ-9 / VANNESSA-7 : 0 / 12     DATA      Progress Since Last Session (Related to Symptoms / Goals / Homework):   Symptoms: Improved    Homework: None given      Episode of Care Goals: Minimal progress - CONTEMPLATION (Considering change and yet undecided); Intervened by assessing the negative and positive thinking (ambivalence) about behavior change     Current / Ongoing Stressors and Concerns:   Diagnosed with autism at age 4. Mom is legal guardian. Anxiety and OCD issues.      Treatment Objective(s) Addressed in This Session:   use cognitive strategies identified in therapy to challenge anxious thoughts       Intervention:  Discussed family dynamics with client and reviewed symptoms and progress.         ASSESSMENT: Current Emotional / Mental Status (status of significant symptoms):   Risk status (Self / Other harm or suicidal ideation)   Client denies current fears or concerns for personal safety.   Client denies current or recent suicidal ideation or behaviors.   Client denies current or recent homicidal ideation or behaviors.   Client denies current or recent self injurious behavior or ideation.   Client denies other safety concerns.   A safety and risk management plan has not been developed at this time, however client was given the after-hours number / 911 should there be a change in any of these risk factors.     Appearance:   Appropriate    Eye Contact:   Fair    Psychomotor Behavior: Normal    Attitude:   Cooperative    Orientation:   All   Speech    Rate / Production: Normal     Volume:  Normal    Mood:    Normal   Affect:    Appropriate    Thought Content:  Clear  Magical Ideations     Thought Form:  Coherent  Logical    Insight:    Poor      Medication Review:   No changes to current psychiatric medication(s)     Medication Compliance:   Yes     Changes in Health Issues:   None reported     Chemical Use Review:   Substance Use: Chemical use reviewed, no active concerns identified      Tobacco Use: No current tobacco use.       Collateral Reports Completed:   Not Applicable    PLAN: (Client Tasks / Therapist Tasks / Other)  Review and update treatment plan as needed.         CHAPITO Blankenship                                                         ________________________________________________________________________    Treatment Plan    Client's Name: Maren Winchester  YOB: 2000    Date: 3/26/18    DSM-V Diagnoses: 300.02 (F41.1) Generalized Anxiety Disorder  Psychosocial / Contextual Factors: Diagnosed with autism at age 4. Mom is legal guardian. Anxiety and OCD issues.   WHODAS: n/a    Referral / Collaboration:  Referral to another professional/service is not indicated at this time..    Anticipated number of session or this episode of care: 8-12      MeasurableTreatment Goal(s) related to diagnosis / functional impairment(s)  Goal 1: Client will learn healthy ways to manage anxiety.    I will know I've met my goal when I can not get so angry.      Objective #A (Client Action)    Client will use cognitive strategies identified in therapy to challenge anxious thoughts.  Status: New - Date: 3/26/18     Intervention(s)  Therapist will teach emotional regulation skills. -.        Client has reviewed and agreed to the above plan.      CHAPITO Blankenship  March 26, 2018

## 2018-07-09 ENCOUNTER — TELEPHONE (OUTPATIENT)
Dept: FAMILY MEDICINE | Facility: OTHER | Age: 18
End: 2018-07-09

## 2018-07-09 DIAGNOSIS — F41.1 GAD (GENERALIZED ANXIETY DISORDER): ICD-10-CM

## 2018-07-09 RX ORDER — FLUOXETINE 10 MG/1
CAPSULE ORAL
Qty: 14 CAPSULE | Refills: 0 | Status: SHIPPED | OUTPATIENT
Start: 2018-07-09 | End: 2018-07-16

## 2018-07-09 NOTE — TELEPHONE ENCOUNTER
Rx for 2 weeks given. Please set up for follow-up visit.   Thanks,  Electronically signed by Leidy Carrizales MD.

## 2018-07-09 NOTE — TELEPHONE ENCOUNTER
LM for patient to return call to clinic;. When call is returned please see note below and assist in scheduling.    Dixie Payne MA

## 2018-07-09 NOTE — TELEPHONE ENCOUNTER
Left message for mom to return call to clinic. Please assist in scheduling follow up appointment when call is returned. Doris Woo, CMA

## 2018-07-15 ASSESSMENT — ANXIETY QUESTIONNAIRES
7. FEELING AFRAID AS IF SOMETHING AWFUL MIGHT HAPPEN: SEVERAL DAYS
GAD7 TOTAL SCORE: 5
2. NOT BEING ABLE TO STOP OR CONTROL WORRYING: SEVERAL DAYS
3. WORRYING TOO MUCH ABOUT DIFFERENT THINGS: SEVERAL DAYS
GAD7 TOTAL SCORE: 5
1. FEELING NERVOUS, ANXIOUS, OR ON EDGE: SEVERAL DAYS
7. FEELING AFRAID AS IF SOMETHING AWFUL MIGHT HAPPEN: SEVERAL DAYS
5. BEING SO RESTLESS THAT IT IS HARD TO SIT STILL: NOT AT ALL
6. BECOMING EASILY ANNOYED OR IRRITABLE: SEVERAL DAYS
4. TROUBLE RELAXING: NOT AT ALL

## 2018-07-16 ENCOUNTER — OFFICE VISIT (OUTPATIENT)
Dept: PEDIATRICS | Facility: OTHER | Age: 18
End: 2018-07-16
Payer: MEDICAID

## 2018-07-16 VITALS
SYSTOLIC BLOOD PRESSURE: 104 MMHG | RESPIRATION RATE: 16 BRPM | HEART RATE: 80 BPM | WEIGHT: 142.5 LBS | HEIGHT: 65 IN | DIASTOLIC BLOOD PRESSURE: 70 MMHG | TEMPERATURE: 96.3 F | BODY MASS INDEX: 23.74 KG/M2

## 2018-07-16 DIAGNOSIS — F84.0 AUTISM SPECTRUM DISORDER: Primary | ICD-10-CM

## 2018-07-16 DIAGNOSIS — R41.89 COGNITIVE IMPAIRMENT: ICD-10-CM

## 2018-07-16 DIAGNOSIS — F41.1 GAD (GENERALIZED ANXIETY DISORDER): ICD-10-CM

## 2018-07-16 PROCEDURE — 99213 OFFICE O/P EST LOW 20 MIN: CPT | Performed by: PEDIATRICS

## 2018-07-16 RX ORDER — FLUOXETINE 10 MG/1
CAPSULE ORAL
Qty: 30 CAPSULE | Refills: 2 | Status: SHIPPED | OUTPATIENT
Start: 2018-07-16 | End: 2018-10-22

## 2018-07-16 ASSESSMENT — PAIN SCALES - GENERAL: PAINLEVEL: NO PAIN (0)

## 2018-07-16 ASSESSMENT — ANXIETY QUESTIONNAIRES: GAD7 TOTAL SCORE: 5

## 2018-07-16 NOTE — PATIENT INSTRUCTIONS
Recommendations in caring for Maren:    Recommend ongoing therapy for skill building and emotion regulation skills with [facility].  Continue Prozac (fluoxetine) 30 mg.    Reviewed importance of coping skills. Maren is able to name several adaptive coping skills including exercise, beading, coloring, witting in a journal, and listening to music.    Strongly encourage aerobic exercise.   Recheck in clinic in 4 months, sooner with concerns.

## 2018-07-16 NOTE — PROGRESS NOTES
SUBJECTIVE:                                                      HPI:  Maren is a 17 year old female with Autism Spectrum Disorder (ASD) with who presents to clinic today for follow-up of anxiety. Last visit: 1/15/18.     Overall, mom feels that Maren is doing about the same. She was very excited to graduate from High School. Will be starting at [employer]. 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 Family is pleased with medicine dose. She still has excess handwashing. Some mood swings attributed to to being a teen.   Current behavioral therapy: [facility] with [facility].  Current coping strategies: coloring, beading, dancing in the house, riding bikes, walks/running.     Medication: Prozac (fluoxetine) 20 mg  Date medication first prescribed: 6/9/15. Tried weaning Prozac (fluoxetine)  10 mg over a month during the summer. Restarted due to moodiness and irritability. She was easily upset over little things. Restarted 10 mg, increased to 15 then to 20 mg 9/17.  Medication problems/concerns: none    Maren continues to try to get regular aerobic activity. She has had ongoing weight loss.   Wt Readings from Last 4 Encounters:   07/16/18 142 lb 8 oz (64.6 kg) (72 %)*   01/15/18 144 lb 8 oz (65.5 kg) (76 %)*   09/22/17 145 lb (65.8 kg) (77 %)*   05/10/17 146 lb 8 oz (66.5 kg) (80 %)*     * Growth percentiles are based on CDC 2-20 Years data.       ROS: Negative for constitutional, eye, ear, nose, throat, skin, respiratory, cardiac, and gastrointestinal other than those outlined in the HPI.    PROBLEM LIST:  Patient Active Problem List    Diagnosis Date Noted     Autism spectrum disorder 01/16/2018     Priority: Medium     Chronic rhinitis 11/12/2016     Priority: Medium     VANNESSA (generalized anxiety disorder) 07/09/2016     Priority: Medium     Constipation, unspecified constipation type 12/10/2015     Priority: Medium     Gross motor delay 04/06/2015     Priority: Medium     Cognitive impairment 07/06/2012      "Priority: Medium     Menorrhagia 07/06/2012     Priority: Medium      MEDICATIONS:  Current Outpatient Prescriptions   Medication Sig Dispense Refill     desogestrel-ethinyl estradiol (APRI) 0.15-30 MG-MCG per tablet Take 1 tablet by mouth daily Skip placebo week except for every 3rd month 84 tablet 3     docusate sodium (COLACE) 100 MG tablet Take 100 mg by mouth daily 90 tablet 3      MG capsule        FLUoxetine (PROZAC) 10 MG capsule Take 10 plus 20 for 30 mg daily 14 capsule 0     FLUoxetine (PROZAC) 20 MG capsule Take 10 plus 20 for 30 mg daily 14 capsule 0     Multiple Vitamins-Minerals (MULTIVITAL PO) Take  by mouth.       fluticasone (FLONASE) 50 MCG/ACT spray Spray 1-2 sprays into both nostrils daily (Patient not taking: Reported on 7/16/2018) 16 g 6     polyethylene glycol (MIRALAX) powder Take 17 g (1 capful) by mouth daily (Patient not taking: Reported on 7/16/2018) 527 g 11      ALLERGIES:  No Known Allergies    Problem list and histories reviewed & adjusted, as indicated.    OBJECTIVE:                                                      /70  Pulse 80  Temp 96.3  F (35.7  C) (Temporal)  Resp 16  Ht 5' 4.57\" (1.64 m)  Wt 142 lb 8 oz (64.6 kg)  BMI 24.03 kg/m2  Physical Exam:  Appearance: in no apparent distress, well developed and well nourished, alert.  Heart: S1, S2 normal, no murmur, no gallop, rate regular.  Lungs: no retractions, clear to auscultation.   Skin: No cutting or lesions.    VANNESSA-7 SCORE 1/15/2018 2/25/2018 7/15/2018   Total Score 12 (moderate anxiety) - 5 (mild anxiety)   Total Score 12 12 5           ASSESSMENT/PLAN:     1. Autism spectrum disorder    2. VANNESSA (generalized anxiety disorder)    3. Cognitive impairment        Recommend ongoing therapy for skill building and emotion regulation skills with FCC.  Continue Prozac (fluoxetine) 30 mg.    Reviewed importance of coping skills. Maren is able to name several adaptive coping skills including exercise, beading, " coloring, witting in a journal, and listening to music.    Strongly encourage aerobic exercise.   Recheck in clinic in 4 months, sooner with concerns.   Safety contracts made. Emergency numbers given.      Patient's parent expresses understanding and agreement with the plan and has no further questions.    Electronically signed by Leidy Carrizales MD.    Answers for HPI/ROS submitted by the patient on 7/15/2018   VANNESSA 7 TOTAL SCORE: 5

## 2018-07-16 NOTE — MR AVS SNAPSHOT
After Visit Summary   7/16/2018    Maren Winchester    MRN: [id number]           Patient Information     Date Of Birth          2000        Visit Information        Provider Department      7/16/2018 2:50 PM Liedy Carrizales MD [facility]        Today's Diagnoses     VANNESSA (generalized anxiety disorder)          Care Instructions    Recommendations in caring for Maren:    Recommend ongoing therapy for skill building and emotion regulation skills with FCC.  Continue Prozac (fluoxetine) 30 mg.    Reviewed importance of coping skills. Maren is able to name several adaptive coping skills including exercise, beading, coloring, witting in a journal, and listening to music.    Strongly encourage aerobic exercise.   Recheck in clinic in 4 months, sooner with concerns.               Follow-ups after your visit        Who to contact     If you have questions or need follow up information about today's clinic visit or your schedule please contact [facility] directly at [phone].  Normal or non-critical lab and imaging results will be communicated to you by [employer], letter or phone within 4 business days after the clinic has received the results. If you do not hear from us within 7 days, please contact the clinic through [employer] or phone. If you have a critical or abnormal lab result, we will notify you by phone as soon as possible.  Submit refill requests through [employer] or call your pharmacy and they will forward the refill request to us. Please allow 3 business days for your refill to be completed.          Additional Information About Your Visit        [employer] Information     [employer] gives you secure access to your electronic health record. If you see a primary care provider, you can also send messages to your care team and make appointments. If you have questions, please call your primary care clinic.  If you do not have a primary care provider, please call  "[phone] and they will assist you.        Care EveryWhere ID     This is your Care EveryWhere ID. This could be used by other organizations to access your [facility] medical records  [id number]        Your Vitals Were     Pulse Temperature Respirations Height BMI (Body Mass Index)       80 96.3  F (35.7  C) (Temporal) 16 5' 4.57\" (1.64 m) 24.03 kg/m2        Blood Pressure from Last 3 Encounters:   07/16/18 104/70   01/15/18 98/52   09/22/17 102/70    Weight from Last 3 Encounters:   07/16/18 142 lb 8 oz (64.6 kg) (72 %)*   01/15/18 144 lb 8 oz (65.5 kg) (76 %)*   09/22/17 145 lb (65.8 kg) (77 %)*     * Growth percentiles are based on [facility] 2-20 Years data.              Today, you had the following     No orders found for display         Where to get your medicines      These medications were sent to [facility] [place] [id number]   [address] , [facility] [address]     Phone:  [phone]     FLUoxetine 10 MG capsule    FLUoxetine 20 MG capsule          Primary Care Provider Office Phone # Fax #    Leidy Carrizales [phone]       [address] [place] [address]        Equal Access to Services     ADONAY HEBERT : Hadii chucky thomas hadasho Somaddy, waaxda luqadaha, qaybta kaalmada alvin, baljit garcia. So [facility] [phone].    ATENCIÓN: Si habla español, tiene a terry disposición servicios gratuitos de asistencia lingüística. Llame al [phone].    We comply with applicable federal civil rights laws and [place] laws. We do not discriminate on the basis of race, color, national origin, age, disability, sex, sexual orientation, or gender identity.            Thank you!     Thank you for choosing [facility]  for your care. Our goal is always to provide you with excellent care. Hearing back from our patients is one way we can continue to improve our services. Please take a few minutes to complete the written " survey that you may receive in the mail after your visit with us. Thank you!             Your Updated Medication List - Protect others around you: Learn how to safely use, store and throw away your medicines at www.disposemymeds.org.          This list is accurate as of 7/16/18  3:40 PM.  Always use your most recent med list.                   Brand Name Dispense Instructions for use Diagnosis    desogestrel-ethinyl estradiol 0.15-30 MG-MCG per tablet    APRI    84 tablet    Take 1 tablet by mouth daily Skip placebo week except for every 3rd month    Menorrhagia with regular cycle       * docusate sodium 100 MG tablet    COLACE    90 tablet    Take 100 mg by mouth daily    Constipation, unspecified constipation type       *  MG capsule   Generic drug:  docusate sodium           * FLUoxetine 10 MG capsule    PROzac    30 capsule    Take 10 plus 20 for 30 mg daily    VANNESSA (generalized anxiety disorder)       * FLUoxetine 20 MG capsule    PROzac    30 capsule    Take 10 plus 20 for 30 mg daily    VANNESSA (generalized anxiety disorder)       fluticasone 50 MCG/ACT spray    FLONASE    16 g    Spray 1-2 sprays into both nostrils daily    Snoring       MULTIVITAL PO      Take  by mouth.        polyethylene glycol powder    MIRALAX    527 g    Take 17 g (1 capful) by mouth daily    Constipation, unspecified constipation type       * Notice:  This list has 4 medication(s) that are the same as other medications prescribed for you. Read the directions carefully, and ask your doctor or other care provider to review them with you.

## 2018-08-07 ENCOUNTER — FCC EXTENDED DOCUMENTATION (OUTPATIENT)
Dept: PSYCHOLOGY | Facility: CLINIC | Age: 18
End: 2018-08-07

## 2018-08-07 NOTE — PROGRESS NOTES
Discharge Summary  Single Session    Client Name: Maren Winchester MRN#: [id number] YOB: 2000      Intake / Discharge Date: 2-13-18/8-7-18      DSM5 Diagnoses: (Sustained by DSM5 Criteria Listed Above)  Diagnoses: 300.02 (F41.1) Generalized Anxiety Disorder  Psychosocial & Contextual Factors: Diagnosed with autism at age 4. Mom is legal guardian. Anxiety and OCD issues.   WHODAS 2.0 (12 item) Score: n/a          Presenting Concern:  Anxiety/obsessive issues (especially with schedule and eating)      Reason for Discharge:  Goals completed      Disposition at Time of Last Encounter:   Comments:    client was content, happy and had no concerns to report     Risk Management:   Client denies a history of suicidal ideation, suicide attempts, self-injurious behavior, homicidal ideation, homicidal behavior and and other safety concerns  A safety and risk management plan has not been developed at this time, however client was given the after-hours number / 911 should there be a change in any of these risk factors.      Referred To:  Discharged          CHAPITO Blankenship   8/7/2018

## 2018-10-18 ENCOUNTER — ALLIED HEALTH/NURSE VISIT (OUTPATIENT)
Dept: FAMILY MEDICINE | Facility: CLINIC | Age: 18
End: 2018-10-18
Payer: MEDICAID

## 2018-10-18 DIAGNOSIS — Z23 NEED FOR PROPHYLACTIC VACCINATION AND INOCULATION AGAINST INFLUENZA: Primary | ICD-10-CM

## 2018-10-18 PROCEDURE — 90686 IIV4 VACC NO PRSV 0.5 ML IM: CPT

## 2018-10-18 PROCEDURE — 90471 IMMUNIZATION ADMIN: CPT

## 2018-10-18 NOTE — PROGRESS NOTES
Injectable Influenza Immunization Documentation    1.  Is the person to be vaccinated sick today?   No    2. Does the person to be vaccinated have an allergy to a component   of the vaccine?   No  Egg Allergy Algorithm Link    3. Has the person to be vaccinated ever had a serious reaction   to influenza vaccine in the past?   No    4. Has the person to be vaccinated ever had Guillain-Barré syndrome?   No    Prior to injection verified patient identity using patient's name and date of birth.  Due to injection administration, patient instructed to remain in clinic for 15 minutes  afterwards, and to report any adverse reaction to me immediately.      Form completed by Doris Troy MA

## 2018-10-18 NOTE — MR AVS SNAPSHOT
After Visit Summary   10/18/2018    Maren Winchester    MRN: [id number]           Patient Information     Date Of Birth          2000        Visit Information        Provider Department      10/18/2018 1:45 PM MIREYA HURT MA [facility]        Today's Diagnoses     Need for prophylactic vaccination and inoculation against influenza    -  1       Follow-ups after your visit        Who to contact     If you have questions or need follow up information about today's clinic visit or your schedule please contact [facility] directly at [phone].  Normal or non-critical lab and imaging results will be communicated to you by [employer], letter or phone within 4 business days after the clinic has received the results. If you do not hear from us within 7 days, please contact the clinic through [employer] or phone. If you have a critical or abnormal lab result, we will notify you by phone as soon as possible.  Submit refill requests through [employer] or call your pharmacy and they will forward the refill request to us. Please allow 3 business days for your refill to be completed.          Additional Information About Your Visit        MyChart Information     [employer] gives you secure access to your electronic health record. If you see a primary care provider, you can also send messages to your care team and make appointments. If you have questions, please call your primary care clinic.  If you do not have a primary care provider, please call [phone] and they will assist you.        Care EveryWhere ID     This is your Care EveryWhere ID. This could be used by other organizations to access your [place] medical records  [id number]         Blood Pressure from Last 3 Encounters:   07/16/18 104/70   01/15/18 98/52   09/22/17 102/70    Weight from Last 3 Encounters:   07/16/18 142 lb 8 oz (64.6 kg) (72 %)*   01/15/18 144 lb 8 oz (65.5 kg) (76 %)*   09/22/17 145 lb (65.8 kg) (77 %)*      * Growth percentiles are based on [facility] 2-20 Years data.              We Performed the Following     FLU VACCINE, SPLIT VIRUS, IM (QUADRIVALENT) [94017]- >3 YRS     Vaccine Administration, Initial [97784]        Primary Care Provider Office Phone # Fax #    Leidy Carrizales [phone]       [address]  [facility] [address]        Equal Access to Services     [facility]: Hadii aad ku hadasho Soomaali, waaxda luqadaha, qaybta kaalmada adeegyada, waxay idiin hayaan adeeg nataliash la'celesten . So [facility] [phone].    ATENCIÓN: Si habla español, tiene a terry disposición servicios gratuitos de asistencia lingüística. [facility] [phone].    We comply with applicable federal civil rights laws and [place] laws. We do not discriminate on the basis of race, color, national origin, age, disability, sex, sexual orientation, or gender identity.            Thank you!     Thank you for choosing [facility]  for your care. Our goal is always to provide you with excellent care. Hearing back from our patients is one way we can continue to improve our services. Please take a few minutes to complete the written survey that you may receive in the mail after your visit with us. Thank you!             Your Updated Medication List - Protect others around you: Learn how to safely use, store and throw away your medicines at www.disposemymeds.org.          This list is accurate as of 10/18/18  1:57 PM.  Always use your most recent med list.                   Brand Name Dispense Instructions for use Diagnosis    desogestrel-ethinyl estradiol 0.15-30 MG-MCG per tablet    APRI    84 tablet    Take 1 tablet by mouth daily Skip placebo week except for every 3rd month    Menorrhagia with regular cycle       * docusate sodium 100 MG tablet    COLACE    90 tablet    Take 100 mg by mouth daily    Constipation, unspecified constipation type       *  MG capsule   Generic drug:  docusate sodium            * FLUoxetine 10 MG capsule    PROzac    30 capsule    Take 10 plus 20 for 30 mg daily    VANNESSA (generalized anxiety disorder)       * FLUoxetine 20 MG capsule    PROzac    30 capsule    Take 10 plus 20 for 30 mg daily    VANNESSA (generalized anxiety disorder)       fluticasone 50 MCG/ACT spray    FLONASE    16 g    Spray 1-2 sprays into both nostrils daily    Snoring       MULTIVITAL PO      Take  by mouth.        polyethylene glycol powder    MIRALAX    527 g    Take 17 g (1 capful) by mouth daily    Constipation, unspecified constipation type       * Notice:  This list has 4 medication(s) that are the same as other medications prescribed for you. Read the directions carefully, and ask your doctor or other care provider to review them with you.

## 2018-10-22 DIAGNOSIS — N92.0 MENORRHAGIA WITH REGULAR CYCLE: ICD-10-CM

## 2018-10-22 DIAGNOSIS — F41.1 GAD (GENERALIZED ANXIETY DISORDER): ICD-10-CM

## 2018-10-22 RX ORDER — DESOGESTREL AND ETHINYL ESTRADIOL 0.15-0.03
1 KIT ORAL DAILY
Qty: 84 TABLET | Refills: 3 | Status: SHIPPED | OUTPATIENT
Start: 2018-10-22 | End: 2018-11-26

## 2018-10-22 RX ORDER — FLUOXETINE 10 MG/1
CAPSULE ORAL
Qty: 30 CAPSULE | Refills: 2 | Status: SHIPPED | OUTPATIENT
Start: 2018-10-22 | End: 2018-12-03

## 2018-11-26 DIAGNOSIS — N92.0 MENORRHAGIA WITH REGULAR CYCLE: ICD-10-CM

## 2018-11-26 RX ORDER — DESOGESTREL AND ETHINYL ESTRADIOL 0.15-0.03
1 KIT ORAL DAILY
Qty: 84 TABLET | Refills: 3 | Status: SHIPPED | OUTPATIENT
Start: 2018-11-26 | End: 2019-03-24

## 2018-12-03 ENCOUNTER — OFFICE VISIT (OUTPATIENT)
Dept: PEDIATRICS | Facility: OTHER | Age: 18
End: 2018-12-03
Payer: MEDICAID

## 2018-12-03 VITALS
DIASTOLIC BLOOD PRESSURE: 60 MMHG | BODY MASS INDEX: 24.57 KG/M2 | HEART RATE: 74 BPM | SYSTOLIC BLOOD PRESSURE: 118 MMHG | WEIGHT: 147.5 LBS | HEIGHT: 65 IN | RESPIRATION RATE: 20 BRPM | TEMPERATURE: 98.1 F

## 2018-12-03 DIAGNOSIS — R41.89 COGNITIVE IMPAIRMENT: ICD-10-CM

## 2018-12-03 DIAGNOSIS — F84.0 AUTISM SPECTRUM DISORDER: ICD-10-CM

## 2018-12-03 DIAGNOSIS — F41.1 GAD (GENERALIZED ANXIETY DISORDER): Primary | ICD-10-CM

## 2018-12-03 PROCEDURE — 99214 OFFICE O/P EST MOD 30 MIN: CPT | Performed by: PEDIATRICS

## 2018-12-03 ASSESSMENT — PATIENT HEALTH QUESTIONNAIRE - PHQ9
SUM OF ALL RESPONSES TO PHQ QUESTIONS 1-9: 4
SUM OF ALL RESPONSES TO PHQ QUESTIONS 1-9: 4

## 2018-12-03 ASSESSMENT — ANXIETY QUESTIONNAIRES
2. NOT BEING ABLE TO STOP OR CONTROL WORRYING: MORE THAN HALF THE DAYS
GAD7 TOTAL SCORE: 11
6. BECOMING EASILY ANNOYED OR IRRITABLE: MORE THAN HALF THE DAYS
GAD7 TOTAL SCORE: 11
7. FEELING AFRAID AS IF SOMETHING AWFUL MIGHT HAPPEN: NOT AT ALL
4. TROUBLE RELAXING: SEVERAL DAYS
1. FEELING NERVOUS, ANXIOUS, OR ON EDGE: NEARLY EVERY DAY
GAD7 TOTAL SCORE: 11
3. WORRYING TOO MUCH ABOUT DIFFERENT THINGS: MORE THAN HALF THE DAYS
5. BEING SO RESTLESS THAT IT IS HARD TO SIT STILL: SEVERAL DAYS
7. FEELING AFRAID AS IF SOMETHING AWFUL MIGHT HAPPEN: NOT AT ALL

## 2018-12-03 NOTE — MR AVS SNAPSHOT
After Visit Summary   12/3/2018    Maren Winchester    MRN: [id number]           Patient Information     Date Of Birth          2000        Visit Information        Provider Department      12/3/2018 9:50 AM Leidy Carrizales MD [facility]        Today's Diagnoses     VANNESSA (generalized anxiety disorder)    -  1      Care Instructions    Recommendations in caring for Maren:    Recommend restarting therapy for skill building and emotion regulation skills with [facility] or other facility.  Will increase Prozac (fluoxetine) from 30 to 40 mg.    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  Reviewed importance of coping skills. Maren is able to name several adaptive coping skills including exercise, beading, coloring, witting in a journal, and listening to music.    Strongly encourage aerobic exercise. Family to purchase a treadmill to use 5 days weekly for 1 hour(s).   Recheck in clinic in 2 months, sooner with concerns.   Safety contracts made. Emergency numbers given.         Pediatric Psychiatrist/Psychologist Clinics:    [facility] - [phone]  [facility] - [phone]  Psychiatry ([place] & [place]) - [phone]  [facility] - [phone]  [facility] ([place], [place], [place], [place]) - [phone]  [facility]  ([facility] [place]) - [phone]  [facility]  ([place]) - [phone]  [facility] ([place]) - [phone]  [facility] ([place]) - [phone]  [facility]) - [phone]   [facility]) - [phone]   [facility]) - [phone]                  Follow-ups after your visit        Follow-up notes from your care team     Return in about 2 months (around 2/3/2019) for mental health recheck.      Who  "to contact     If you have questions or need follow up information about today's clinic visit or your schedule please contact [facility] [place] directly at [phone].  Normal or non-critical lab and imaging results will be communicated to you by MyChart, letter or phone within 4 business days after the clinic has received the results. If you do not hear from us within 7 days, please contact the clinic through "[employer]hart or phone. If you have a critical or abnormal lab result, we will notify you by phone as soon as possible.  Submit refill requests through [employer] or call your pharmacy and they will forward the refill request to us. Please allow 3 business days for your refill to be completed.          Additional Information About Your Visit        [employer] Information     [employer] gives you secure access to your electronic health record. If you see a primary care provider, you can also send messages to your care team and make appointments. If you have questions, please call your primary care clinic.  If you do not have a primary care provider, please call [phone] and they will assist you.        Care EveryWhere ID     This is your Care EveryWhere ID. This could be used by other organizations to access your [place] medical records  [id number]        Your Vitals Were     Pulse Temperature Respirations Height BMI (Body Mass Index)       74 98.1  F (36.7  C) (Temporal) 20 5' 4.57\" (1.64 m) 24.88 kg/m2        Blood Pressure from Last 3 Encounters:   12/03/18 118/60   07/16/18 104/70   01/15/18 98/52    Weight from Last 3 Encounters:   12/03/18 147 lb 8 oz (66.9 kg)   07/16/18 142 lb 8 oz (64.6 kg) (72 %)*   01/15/18 144 lb 8 oz (65.5 kg) (76 %)*     * Growth percentiles are based on CDC 2-20 Years data.              Today, you had the following     No orders found for display         Today's Medication Changes          These changes are accurate as of 12/3/18  9:58 AM.  If you have any questions, ask your " nurse or doctor.               Start taking these medicines.        Dose/Directions    FLUoxetine 20 MG capsule   Commonly known as:  PROzac   Used for:  VANNESSA (generalized anxiety disorder)   Started by:  Leidy Carrizales MD        Dose:  40 mg   Take 2 capsules (40 mg) by mouth daily   Quantity:  60 capsule   Refills:  2                Primary Care Provider Office Phone # Fax #    Leidy Carrizales -[phone]       [address]  [facility] [address]        Equal Access to Services     [facility]: Hadii aad ku hadasho Soomaali, waaxda luqadaha, qaybta kaalmada adeegyada, waxay idiin hayaan adeeg kharash la'charity . So [facility] [phone].    ATENCIÓN: Si habla español, tiene a terry disposición servicios gratuitos de asistencia lingüística. [facility] [phone].    We comply with applicable federal civil rights laws and [place] laws. We do not discriminate on the basis of race, color, national origin, age, disability, sex, sexual orientation, or gender identity.            Thank you!     Thank you for choosing [facility] [place] [facility]  for your care. Our goal is always to provide you with excellent care. Hearing back from our patients is one way we can continue to improve our services. Please take a few minutes to complete the written survey that you may receive in the mail after your visit with us. Thank you!             Your Updated Medication List - Protect others around you: Learn how to safely use, store and throw away your medicines at www.disposemymeds.org.          This list is accurate as of 12/3/18  9:58 AM.  Always use your most recent med list.                   Brand Name Dispense Instructions for use Diagnosis    desogestrel-ethinyl estradiol 0.15-30 MG-MCG tablet    APRI    84 tablet    Take 1 tablet by mouth daily Skip placebo week except for every 3rd month    Menorrhagia with regular cycle       * docusate sodium 100 MG tablet    COLACE    90 tablet    Take 100 mg by mouth  daily    Constipation, unspecified constipation type       *  MG capsule   Generic drug:  docusate sodium           FLUoxetine 20 MG capsule    PROzac    60 capsule    Take 2 capsules (40 mg) by mouth daily    VANNESSA (generalized anxiety disorder)       fluticasone 50 MCG/ACT nasal spray    FLONASE    16 g    Spray 1-2 sprays into both nostrils daily    Snoring       MULTIVITAL PO      Take  by mouth.        polyethylene glycol powder    MIRALAX    527 g    Take 17 g (1 capful) by mouth daily    Constipation, unspecified constipation type       * Notice:  This list has 2 medication(s) that are the same as other medications prescribed for you. Read the directions carefully, and ask your doctor or other care provider to review them with you.

## 2018-12-03 NOTE — PATIENT INSTRUCTIONS
Recommendations in caring for Maren:    Recommend restarting therapy for skill building and emotion regulation skills with [facility] or other facility.  Will increase Prozac (fluoxetine) from 30 to 40 mg.    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  Reviewed importance of coping skills. Maren is able to name several adaptive coping skills including exercise, beading, coloring, witting in a journal, and listening to music.    Strongly encourage aerobic exercise. Family to purchase a treadmill to use 5 days weekly for 1 hour(s).   Recheck in clinic in 2 months, sooner with concerns.   Safety contracts made. Emergency numbers given.         Pediatric Psychiatrist/Psychologist Clinics:    [facility] - [phone]  [facility] - [phone]  Psychiatry ([place] & [place]) - [phone]  [facility] - [phone]  [facility] ([facility] [place]) - [phone]  [facility]  ([facility] [place]) - [phone]  [facility]  ([place]) - [phone]  [facility] ([place]) - [phone]  [facility] ([place]) - [phone]  [facility]) - [phone]   [facility]) - [phone]   [facility]) - [phone]

## 2018-12-04 ASSESSMENT — PATIENT HEALTH QUESTIONNAIRE - PHQ9: SUM OF ALL RESPONSES TO PHQ QUESTIONS 1-9: 4

## 2018-12-04 ASSESSMENT — ANXIETY QUESTIONNAIRES: GAD7 TOTAL SCORE: 11

## 2019-03-24 ENCOUNTER — MYC REFILL (OUTPATIENT)
Dept: PEDIATRICS | Facility: OTHER | Age: 19
End: 2019-03-24

## 2019-03-24 DIAGNOSIS — F41.1 GAD (GENERALIZED ANXIETY DISORDER): ICD-10-CM

## 2019-04-03 ENCOUNTER — OFFICE VISIT (OUTPATIENT)
Dept: PEDIATRICS | Facility: OTHER | Age: 19
End: 2019-04-03
Payer: MEDICAID

## 2019-04-03 VITALS
HEIGHT: 64 IN | BODY MASS INDEX: 25.7 KG/M2 | TEMPERATURE: 99.1 F | OXYGEN SATURATION: 100 % | HEART RATE: 87 BPM | WEIGHT: 150.5 LBS | SYSTOLIC BLOOD PRESSURE: 110 MMHG | RESPIRATION RATE: 18 BRPM | DIASTOLIC BLOOD PRESSURE: 82 MMHG

## 2019-04-03 DIAGNOSIS — F41.1 GAD (GENERALIZED ANXIETY DISORDER): Primary | ICD-10-CM

## 2019-04-03 DIAGNOSIS — R53.83 FATIGUE, UNSPECIFIED TYPE: ICD-10-CM

## 2019-04-03 LAB
BASOPHILS # BLD AUTO: 0 10E9/L (ref 0–0.2)
BASOPHILS NFR BLD AUTO: 0.4 %
DIFFERENTIAL METHOD BLD: NORMAL
EOSINOPHIL # BLD AUTO: 0.1 10E9/L (ref 0–0.7)
EOSINOPHIL NFR BLD AUTO: 1.7 %
ERYTHROCYTE [DISTWIDTH] IN BLOOD BY AUTOMATED COUNT: 13.1 % (ref 10–15)
HCT VFR BLD AUTO: 38.7 % (ref 35–47)
HGB BLD-MCNC: 12.7 G/DL (ref 11.7–15.7)
LYMPHOCYTES # BLD AUTO: 2.6 10E9/L (ref 0.8–5.3)
LYMPHOCYTES NFR BLD AUTO: 31.6 %
MCH RBC QN AUTO: 28 PG (ref 26.5–33)
MCHC RBC AUTO-ENTMCNC: 32.8 G/DL (ref 31.5–36.5)
MCV RBC AUTO: 85 FL (ref 78–100)
MONOCYTES # BLD AUTO: 0.8 10E9/L (ref 0–1.3)
MONOCYTES NFR BLD AUTO: 9 %
NEUTROPHILS # BLD AUTO: 4.8 10E9/L (ref 1.6–8.3)
NEUTROPHILS NFR BLD AUTO: 57.3 %
PLATELET # BLD AUTO: 303 10E9/L (ref 150–450)
RBC # BLD AUTO: 4.54 10E12/L (ref 3.8–5.2)
T4 FREE SERPL-MCNC: 1.13 NG/DL (ref 0.76–1.46)
TSH SERPL DL<=0.005 MIU/L-ACNC: 0.13 MU/L (ref 0.4–4)
WBC # BLD AUTO: 8.3 10E9/L (ref 4–11)

## 2019-04-03 PROCEDURE — 36415 COLL VENOUS BLD VENIPUNCTURE: CPT | Performed by: PEDIATRICS

## 2019-04-03 PROCEDURE — 82306 VITAMIN D 25 HYDROXY: CPT | Performed by: PEDIATRICS

## 2019-04-03 PROCEDURE — 85025 COMPLETE CBC W/AUTO DIFF WBC: CPT | Performed by: PEDIATRICS

## 2019-04-03 PROCEDURE — 99214 OFFICE O/P EST MOD 30 MIN: CPT | Performed by: PEDIATRICS

## 2019-04-03 PROCEDURE — 84439 ASSAY OF FREE THYROXINE: CPT | Performed by: PEDIATRICS

## 2019-04-03 PROCEDURE — 84443 ASSAY THYROID STIM HORMONE: CPT | Performed by: PEDIATRICS

## 2019-04-03 RX ORDER — ESCITALOPRAM OXALATE 10 MG/1
TABLET ORAL
Qty: 60 TABLET | Refills: 0 | Status: SHIPPED | OUTPATIENT
Start: 2019-04-03 | End: 2019-08-19

## 2019-04-03 ASSESSMENT — ANXIETY QUESTIONNAIRES
GAD7 TOTAL SCORE: 4
3. WORRYING TOO MUCH ABOUT DIFFERENT THINGS: SEVERAL DAYS
4. TROUBLE RELAXING: NOT AT ALL
7. FEELING AFRAID AS IF SOMETHING AWFUL MIGHT HAPPEN: NOT AT ALL
2. NOT BEING ABLE TO STOP OR CONTROL WORRYING: SEVERAL DAYS
GAD7 TOTAL SCORE: 4
7. FEELING AFRAID AS IF SOMETHING AWFUL MIGHT HAPPEN: NOT AT ALL
GAD7 TOTAL SCORE: 4
6. BECOMING EASILY ANNOYED OR IRRITABLE: SEVERAL DAYS
1. FEELING NERVOUS, ANXIOUS, OR ON EDGE: SEVERAL DAYS
5. BEING SO RESTLESS THAT IT IS HARD TO SIT STILL: NOT AT ALL

## 2019-04-03 ASSESSMENT — PATIENT HEALTH QUESTIONNAIRE - PHQ9
SUM OF ALL RESPONSES TO PHQ QUESTIONS 1-9: 6
10. IF YOU CHECKED OFF ANY PROBLEMS, HOW DIFFICULT HAVE THESE PROBLEMS MADE IT FOR YOU TO DO YOUR WORK, TAKE CARE OF THINGS AT HOME, OR GET ALONG WITH OTHER PEOPLE: SOMEWHAT DIFFICULT
SUM OF ALL RESPONSES TO PHQ QUESTIONS 1-9: 6

## 2019-04-03 ASSESSMENT — MIFFLIN-ST. JEOR: SCORE: 1441.98

## 2019-04-03 NOTE — PROGRESS NOTES
SUBJECTIVE:                                                      Chief Complaint   Patient presents with     Recheck Medication     Health Maintenance     last wcc: 5/10/17     Abnormal Bleeding Problem     irregular bleeding. LMP unknown        HPI:  Maren is a 18 year old female with Autism Spectrum Disorder (ASD) who presents to clinic today for follow-up of Generalized Anxiety Disorder (VANNESSA). Last visit: 12/3/18.     Overall, mom feels that Maren is doing not as well, a little up and down. She may have 1 day(s) with extreme high then several lows with poor motivatin and fatigue. No manic behaviors.   Current behavioral therapy: none since Freddy with [facility] left.  Current coping strategies: dancing in home, no longer walking.   Work: working at a [employer], attending [employer] transitional program. 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     Medication: Prozac (fluoxetine) 40 mg  Date medication first prescribed: 6/9/15.   Date dosage increased: 12/3/18  Missed doses: rare  Sleep difficulties:none  Stomachaches: none  Headaches: none  Restlessness: none  Unsettled thoughts: none  Suicidal thoughts: none  Other problems/concerns: none    ROS: Negative for constitutional, eye, ear, nose, throat, skin, respiratory, cardiac, and gastrointestinal other than those outlined in the HPI.    PROBLEM LIST:  Patient Active Problem List    Diagnosis Date Noted     Autism spectrum disorder 01/16/2018     Priority: Medium     Chronic rhinitis 11/12/2016     Priority: Medium     VANNESSA (generalized anxiety disorder) 07/09/2016     Priority: Medium     Constipation, unspecified constipation type 12/10/2015     Priority: Medium     Gross motor delay 04/06/2015     Priority: Medium     Cognitive impairment 07/06/2012     Priority: Medium     Menorrhagia 07/06/2012     Priority: Medium      MEDICATIONS:  Current Outpatient Medications   Medication Sig Dispense Refill     desogestrel-ethinyl estradiol  "(APRI) 0.15-30 MG-MCG tablet Take 1 tablet by mouth daily Skip placebo week except for every 3rd month 84 tablet 3     docusate sodium (COLACE) 100 MG tablet Take 1 tablet (100 mg) by mouth daily 90 tablet 3     FLUoxetine (PROZAC) 20 MG capsule Take 2 capsules (40 mg) by mouth daily 60 capsule 2     fluticasone (FLONASE) 50 MCG/ACT spray Spray 1-2 sprays into both nostrils daily 16 g 6     Multiple Vitamins-Minerals (MULTIVITAL PO) Take  by mouth.       polyethylene glycol (MIRALAX) powder Take 17 g (1 capful) by mouth daily 527 g 11      ALLERGIES:  No Known Allergies    Problem list and histories reviewed & adjusted, as indicated.    OBJECTIVE:                                                      /82   Pulse 87   Temp 99.1  F (37.3  C) (Temporal)   Resp 18   Ht 5' 4.27\" (1.632 m)   Wt 150 lb 8 oz (68.3 kg)   LMP  (LMP Unknown)   SpO2 100%   BMI 25.62 kg/m    Physical Exam:  Appearance: in no apparent distress, well developed and well nourished, alert.  Heart: S1, S2 normal, no murmur, no gallop, rate regular.  Lungs: no retractions, clear to auscultation.   Skin: No cutting or lesions.    PHQ-9 SCORE 2/25/2018 12/3/2018 4/3/2019   PHQ-9 Total Score MyChart - 4 (Minimal depression) 6 (Mild depression)   PHQ-9 Total Score 0 4 6         ASSESSMENT/PLAN:     Autism Spectrum Disorder (ASD)--  Generalized Anxiety Disorder (VANNESSA)--  Cognitive Impairment--  Fatigue--     Recommend restarting therapy for skill building and emotion regulation skills with [facility] or other facility.  Will change from Prozac (fluoxetine) 40 mg to escitalopram (Lexapro) : 10 mg daily x 1-2 weeks, then 20 mg daily if tolerating.    Laboratory evaluation per Epic orders. Further evaluation and management as appropriate.   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  Reviewed importance of coping skills. Maren is able to name several adaptive coping skills including beading, coloring, witting in a " journal, and listening to music. Will restart exercise with walking 4 days weekly.     Recommend good sleep hygiene including no napping and regular bedtime.   Please send update by [employer] in 3 weeks.   Recheck in clinic in 3 months, sooner with concerns.       Patient's parent expresses understanding and agreement with the plan and has no further questions.    Electronically signed by Leidy Carrizales MD.

## 2019-04-03 NOTE — PATIENT INSTRUCTIONS
Recommend restarting therapy for skill building and emotion regulation skills with [facility] or other facility.  Will change from Prozac (fluoxetine) 40 mg to escitalopram (Lexapro) : 10 mg daily x 1-2 weeks, then 20 mg daily if tolerating..    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.  Reviewed importance of coping skills. Maren is able to name several adaptive coping skills including beading, coloring, witting in a journal, and listening to music. Will restart exercise with walking 4 days weekly.     Please send update by [employer] in 3 weeks.   Recheck in clinic in 3 months, sooner with concerns.   Recommend good sleep hygiene including no napping and regular bedtime.

## 2019-04-04 ENCOUNTER — TELEPHONE (OUTPATIENT)
Dept: PEDIATRICS | Facility: OTHER | Age: 19
End: 2019-04-04

## 2019-04-04 DIAGNOSIS — R79.89 LOW TSH LEVEL: Primary | ICD-10-CM

## 2019-04-04 LAB — DEPRECATED CALCIDIOL+CALCIFEROL SERPL-MC: 39 UG/L (ref 20–75)

## 2019-04-04 ASSESSMENT — PATIENT HEALTH QUESTIONNAIRE - PHQ9: SUM OF ALL RESPONSES TO PHQ QUESTIONS 1-9: 6

## 2019-04-04 ASSESSMENT — ANXIETY QUESTIONNAIRES: GAD7 TOTAL SCORE: 4

## 2019-04-05 NOTE — TELEPHONE ENCOUNTER
LM for family to call clinic, when call is returned please relay message below. Fiona Patterson, [facility]

## 2019-04-05 NOTE — TELEPHONE ENCOUNTER
Labs:  Results for orders placed or performed in visit on 04/03/19   Vitamin D Deficiency   Result Value Ref Range    Vitamin D Deficiency screening 39 20 - 75 ug/L   CBC with platelets differential   Result Value Ref Range    WBC 8.3 4.0 - 11.0 10e9/L    RBC Count 4.54 3.8 - 5.2 10e12/L    Hemoglobin 12.7 11.7 - 15.7 g/dL    Hematocrit 38.7 35.0 - 47.0 %    MCV 85 78 - 100 fl    MCH 28.0 26.5 - 33.0 pg    MCHC 32.8 31.5 - 36.5 g/dL    RDW 13.1 10.0 - 15.0 %    Platelet Count 303 150 - 450 10e9/L    % Neutrophils 57.3 %    % Lymphocytes 31.6 %    % Monocytes 9.0 %    % Eosinophils 1.7 %    % Basophils 0.4 %    Absolute Neutrophil 4.8 1.6 - 8.3 10e9/L    Absolute Lymphocytes 2.6 0.8 - 5.3 10e9/L    Absolute Monocytes 0.8 0.0 - 1.3 10e9/L    Absolute Eosinophils 0.1 0.0 - 0.7 10e9/L    Absolute Basophils 0.0 0.0 - 0.2 10e9/L    Diff Method Automated Method    T4 free   Result Value Ref Range    T4 Free 1.13 0.76 - 1.46 ng/dL   TSH   Result Value Ref Range    TSH 0.13 (L) 0.40 - 4.00 mU/L      Please let mom know that labs are all normal except for TSH which is low. Her free T4 level, the hormone released by the thyroid gland, is normal. Recommend no therapy. Recommend rechecking in 4 weeks with lab only visit.     Thanks,  Leidy Carrizales MD.

## 2019-04-11 PROBLEM — R53.83 FATIGUE, UNSPECIFIED TYPE: Status: ACTIVE | Noted: 2019-04-11

## 2019-04-28 ENCOUNTER — MYC REFILL (OUTPATIENT)
Dept: PEDIATRICS | Facility: OTHER | Age: 19
End: 2019-04-28

## 2019-04-28 ENCOUNTER — MYC MEDICAL ADVICE (OUTPATIENT)
Dept: PEDIATRICS | Facility: OTHER | Age: 19
End: 2019-04-28

## 2019-04-28 DIAGNOSIS — F41.1 GAD (GENERALIZED ANXIETY DISORDER): ICD-10-CM

## 2019-04-28 DIAGNOSIS — R53.83 FATIGUE, UNSPECIFIED TYPE: ICD-10-CM

## 2019-04-30 RX ORDER — ESCITALOPRAM OXALATE 20 MG/1
20 TABLET ORAL DAILY
Qty: 90 TABLET | Refills: 1 | Status: SHIPPED | OUTPATIENT
Start: 2019-04-30 | End: 2019-05-12

## 2019-04-30 RX ORDER — ESCITALOPRAM OXALATE 10 MG/1
TABLET ORAL
Qty: 60 TABLET | Refills: 0 | Status: CANCELLED | OUTPATIENT
Start: 2019-04-30

## 2019-05-12 ENCOUNTER — MYC REFILL (OUTPATIENT)
Dept: PEDIATRICS | Facility: OTHER | Age: 19
End: 2019-05-12

## 2019-05-12 DIAGNOSIS — K59.00 CONSTIPATION, UNSPECIFIED CONSTIPATION TYPE: ICD-10-CM

## 2019-05-12 DIAGNOSIS — F41.1 GAD (GENERALIZED ANXIETY DISORDER): ICD-10-CM

## 2019-05-13 RX ORDER — ESCITALOPRAM OXALATE 20 MG/1
20 TABLET ORAL DAILY
Qty: 90 TABLET | Refills: 1 | Status: SHIPPED | OUTPATIENT
Start: 2019-05-13 | End: 2019-08-19

## 2019-05-13 RX ORDER — ASPIRIN 81 MG
100 TABLET, DELAYED RELEASE (ENTERIC COATED) ORAL DAILY
Qty: 90 TABLET | Refills: 3 | Status: SHIPPED | OUTPATIENT
Start: 2019-05-13 | End: 2020-06-09

## 2019-06-09 ENCOUNTER — MYC REFILL (OUTPATIENT)
Dept: PEDIATRICS | Facility: OTHER | Age: 19
End: 2019-06-09

## 2019-06-09 DIAGNOSIS — N92.0 MENORRHAGIA WITH REGULAR CYCLE: ICD-10-CM

## 2019-06-10 RX ORDER — DESOGESTREL AND ETHINYL ESTRADIOL 0.15-0.03
1 KIT ORAL DAILY
Qty: 84 TABLET | Refills: 3 | OUTPATIENT
Start: 2019-06-10

## 2019-06-14 ENCOUNTER — MYC REFILL (OUTPATIENT)
Dept: PEDIATRICS | Facility: OTHER | Age: 19
End: 2019-06-14

## 2019-06-14 DIAGNOSIS — N92.0 MENORRHAGIA WITH REGULAR CYCLE: ICD-10-CM

## 2019-06-14 RX ORDER — DESOGESTREL AND ETHINYL ESTRADIOL 0.15-0.03
1 KIT ORAL DAILY
Qty: 84 TABLET | Refills: 3 | OUTPATIENT
Start: 2019-06-14

## 2019-06-14 NOTE — TELEPHONE ENCOUNTER
Sent on 3/25/19 with 84 tablets and 3 refills. Not appropriate at this time.      Madelyn Barragan, RN, BSN

## 2019-06-16 ENCOUNTER — TELEPHONE (OUTPATIENT)
Dept: PEDIATRICS | Facility: OTHER | Age: 19
End: 2019-06-16

## 2019-06-16 DIAGNOSIS — N92.0 MENORRHAGIA WITH REGULAR CYCLE: ICD-10-CM

## 2019-06-16 NOTE — TELEPHONE ENCOUNTER
Maren has a current prescription of Apri (Flavia)   This was written for only 3 packs: and it would be easier if it was written for 4 packs- 4 packs would last 3 months.     Or I did show Maren's mom the convenience of the Setlakin pack. This package has 91 tablets, altogether in one package     Thanks  Maria De Jesus Millan [facility]   [phone]

## 2019-06-18 RX ORDER — DESOGESTREL AND ETHINYL ESTRADIOL 0.15-0.03
1 KIT ORAL DAILY
Qty: 112 TABLET | Refills: 3 | Status: SHIPPED | OUTPATIENT
Start: 2019-06-18 | End: 2019-12-18

## 2019-08-19 ENCOUNTER — OFFICE VISIT (OUTPATIENT)
Dept: PEDIATRICS | Facility: OTHER | Age: 19
End: 2019-08-19
Payer: MEDICAID

## 2019-08-19 ENCOUNTER — MYC REFILL (OUTPATIENT)
Dept: PEDIATRICS | Facility: OTHER | Age: 19
End: 2019-08-19

## 2019-08-19 VITALS
BODY MASS INDEX: 25.83 KG/M2 | TEMPERATURE: 98.6 F | WEIGHT: 155 LBS | HEIGHT: 65 IN | DIASTOLIC BLOOD PRESSURE: 64 MMHG | RESPIRATION RATE: 14 BRPM | HEART RATE: 88 BPM | SYSTOLIC BLOOD PRESSURE: 110 MMHG

## 2019-08-19 DIAGNOSIS — R41.89 COGNITIVE IMPAIRMENT: ICD-10-CM

## 2019-08-19 DIAGNOSIS — R79.89 LOW TSH LEVEL: ICD-10-CM

## 2019-08-19 DIAGNOSIS — R53.83 FATIGUE, UNSPECIFIED TYPE: ICD-10-CM

## 2019-08-19 DIAGNOSIS — N92.0 MENORRHAGIA WITH REGULAR CYCLE: ICD-10-CM

## 2019-08-19 DIAGNOSIS — F84.0 AUTISM SPECTRUM DISORDER: ICD-10-CM

## 2019-08-19 DIAGNOSIS — F41.1 GAD (GENERALIZED ANXIETY DISORDER): Primary | ICD-10-CM

## 2019-08-19 PROCEDURE — 99214 OFFICE O/P EST MOD 30 MIN: CPT | Performed by: PEDIATRICS

## 2019-08-19 RX ORDER — DESOGESTREL AND ETHINYL ESTRADIOL 0.15-0.03
1 KIT ORAL DAILY
Qty: 112 TABLET | Refills: 3 | Status: CANCELLED | OUTPATIENT
Start: 2019-08-19

## 2019-08-19 RX ORDER — ESCITALOPRAM OXALATE 20 MG/1
20 TABLET ORAL DAILY
Qty: 90 TABLET | Refills: 1 | Status: SHIPPED | OUTPATIENT
Start: 2019-08-19 | End: 2020-02-25

## 2019-08-19 ASSESSMENT — ANXIETY QUESTIONNAIRES
7. FEELING AFRAID AS IF SOMETHING AWFUL MIGHT HAPPEN: SEVERAL DAYS
3. WORRYING TOO MUCH ABOUT DIFFERENT THINGS: SEVERAL DAYS
4. TROUBLE RELAXING: SEVERAL DAYS
2. NOT BEING ABLE TO STOP OR CONTROL WORRYING: SEVERAL DAYS
7. FEELING AFRAID AS IF SOMETHING AWFUL MIGHT HAPPEN: SEVERAL DAYS
5. BEING SO RESTLESS THAT IT IS HARD TO SIT STILL: NOT AT ALL
6. BECOMING EASILY ANNOYED OR IRRITABLE: MORE THAN HALF THE DAYS
1. FEELING NERVOUS, ANXIOUS, OR ON EDGE: SEVERAL DAYS
GAD7 TOTAL SCORE: 7

## 2019-08-19 ASSESSMENT — PATIENT HEALTH QUESTIONNAIRE - PHQ9
SUM OF ALL RESPONSES TO PHQ QUESTIONS 1-9: 6
SUM OF ALL RESPONSES TO PHQ QUESTIONS 1-9: 6
10. IF YOU CHECKED OFF ANY PROBLEMS, HOW DIFFICULT HAVE THESE PROBLEMS MADE IT FOR YOU TO DO YOUR WORK, TAKE CARE OF THINGS AT HOME, OR GET ALONG WITH OTHER PEOPLE: SOMEWHAT DIFFICULT

## 2019-08-19 ASSESSMENT — MIFFLIN-ST. JEOR: SCORE: 1467.08

## 2019-08-19 NOTE — PROGRESS NOTES
"SUBJECTIVE:                                                             HPI:  Maren is a 18 year old female with Autism Spectrum Disorder (ASD) with who presents to clinic today for follow-up of Generalized Anxiety Disorder (VANNESSA). Last visit: 4/3/19.     Overall, mom feels that Maren is not doing well. She is \"fixated on death all the time... complains of being tired... is impatient\".   Liz states that she is \"bothered by bad memories\".     Current behavioral therapy: none since Freddy with [facility] left.  Current coping strategies: coloring, dancing in home, now walking after supper, will buying a tredmill.   Work: working at a [employer], attending [place] transitional program. 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 Looking for paid work.     Medication: escitalopram (Lexapro) 20 mg  Date medication first prescribed: 4/3/19  Date dosage increased: 10 mg daily x 1-2 weeks, then 20 mg daily  Missed doses: rare  Sleep difficulties: ongoing problems since before medication therapy change, lays down at 9:30-10:30 and wakes early am then goes back to sleep, fights napping. Mom feels that she gets thoughts and fixating on them, wonders about nightmares.   Stomachaches: none  Headaches: none  Restlessness: none  Unsettled thoughts: none  Suicidal thoughts: none  Other problems/concerns: none    Maren is pleased with menses on OCPs. No history of sexual activity.       ROS: Negative for constitutional, eye, ear, nose, throat, skin, respiratory, cardiac, and gastrointestinal other than those outlined in the HPI.    PROBLEM LIST:  Patient Active Problem List    Diagnosis Date Noted     Fatigue, unspecified type 04/11/2019     Priority: Medium     Low TSH level 04/04/2019     Priority: Medium     Autism spectrum disorder 01/16/2018     Priority: Medium     Chronic rhinitis 11/12/2016     Priority: Medium     VANNESSA (generalized anxiety disorder) 07/09/2016     " "Priority: Medium     Constipation, unspecified constipation type 12/10/2015     Priority: Medium     Gross motor delay 04/06/2015     Priority: Medium     Cognitive impairment 07/06/2012     Priority: Medium     Menorrhagia 07/06/2012     Priority: Medium      MEDICATIONS:  Current Outpatient Medications   Medication Sig Dispense Refill     desogestrel-ethinyl estradiol (APRI) 0.15-30 MG-MCG tablet Take 1 tablet by mouth daily Skip placebo week except for every 3rd month 112 tablet 3     docusate sodium (COLACE) 100 MG tablet Take 1 tablet (100 mg) by mouth daily 90 tablet 3     escitalopram (LEXAPRO) 20 MG tablet Take 1 tablet (20 mg) by mouth daily 90 tablet 1     Multiple Vitamins-Minerals (MULTIVITAL PO) Take  by mouth.       polyethylene glycol (MIRALAX) powder Take 17 g (1 capful) by mouth daily 527 g 11     fluticasone (FLONASE) 50 MCG/ACT spray Spray 1-2 sprays into both nostrils daily (Patient not taking: Reported on 8/19/2019) 16 g 6      ALLERGIES:  No Known Allergies    Problem list and histories reviewed & adjusted, as indicated.    OBJECTIVE:                                                      /64   Pulse 88   Temp 98.6  F (37  C) (Temporal)   Resp 14   Ht 5' 4.57\" (1.64 m)   Wt 155 lb (70.3 kg)   BMI 26.14 kg/m       Wt Readings from Last 4 Encounters:   08/19/19 155 lb (70.3 kg)   04/03/19 150 lb 8 oz (68.3 kg)   12/03/18 147 lb 8 oz (66.9 kg)   07/16/18 142 lb 8 oz (64.6 kg) (72 %)*     * Growth percentiles are based on CDC (Girls, 2-20 Years) data.       Physical Exam:  Appearance: in no apparent distress, well developed and well nourished, alert. Lorin does have a flatter affect today.  Heart: S1, S2 normal, no murmur, no gallop, rate regular.  Lungs: no retractions, clear to auscultation.   Skin: No cutting or lesions.    VANNESSA-7 SCORE 12/3/2018 4/3/2019 8/19/2019   Total Score 11 (moderate anxiety) 4 (minimal anxiety) 7 (mild anxiety)   Total Score 11 4 7       PHQ-9 SCORE 12/3/2018 " 4/3/2019 8/19/2019   PHQ-9 Total Score [place] 4 (Minimal depression) 6 (Mild depression) 6 (Mild depression)   PHQ-9 Total Score 4 6 6       ASSESSMENT/PLAN:     Autism Spectrum Disorder (ASD)--  Generalized Anxiety Disorder (VANNESSA)--  Comment- not doing as well but not seeing a therapist or optimizing coping skills  Cognitive Impairment--  Fatigue--  History of Low TSH with Normal free T4--     Recommend restarting therapy for skill building and emotion regulation skills with [facility] or other facility.  Will continue escitalopram (Lexapro) : 20 mg daily if tolerating.    Recommend further evaluation and management with adult psychiatry. Mom to make appointment.  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  Reviewed importance of coping skills. Recommend starting a gratitude journal. Maren is able to name several adaptive coping skills including coloring, witting in a journal, and listening to music. Increase exercise to 1 hour walking 5 days weekly. Will be geting a treadmill for bad weather.   Recommend good sleep hygiene including no napping and regular bedtime.     Will also transition Lorin to an adult medicine provider, namely Dr. Coley.     Patient and parent express understanding and agreement with the plan and has no further questions.    Electronically signed by Leidy Carrizales MD.

## 2019-08-19 NOTE — PATIENT INSTRUCTIONS
Recommend restarting therapy for skill building and emotion regulation skills with [facility] or other facility.  Will continue escitalopram (Lexapro) : 20 mg daily if tolerating.    Recommend further evaluation and management with adult psychiatry.   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  Reviewed importance of coping skills. Recommend starting a gratitude journal. Maren is able to name several adaptive coping skills including coloring, witting in a journal, and listening to music. Increase exercise to 1 hour walking 5 days weekly. Will get a treadmill for bad weather.   Recommend good sleep hygiene including no napping and regular bedtime.   Will transition primary care to Dr. Coley.       Pediatric Psychiatrist/Psychologist Clinics:    [facility] ([facility] [place]) - [phone]  [facility]) - [phone]  [facility]) - [phone]  [facility]) - [phone]   [facility]) - [phone]   [facility]) - [phone]

## 2019-08-20 ENCOUNTER — TELEPHONE (OUTPATIENT)
Dept: PEDIATRICS | Facility: OTHER | Age: 19
End: 2019-08-20

## 2019-08-20 ASSESSMENT — PATIENT HEALTH QUESTIONNAIRE - PHQ9: SUM OF ALL RESPONSES TO PHQ QUESTIONS 1-9: 6

## 2019-08-20 ASSESSMENT — ANXIETY QUESTIONNAIRES: GAD7 TOTAL SCORE: 7

## 2019-08-22 DIAGNOSIS — R79.89 LOW TSH LEVEL: ICD-10-CM

## 2019-08-22 LAB
T4 FREE SERPL-MCNC: 1.85 NG/DL (ref 0.76–1.46)
TSH SERPL DL<=0.005 MIU/L-ACNC: <0.01 MU/L (ref 0.4–4)

## 2019-08-22 PROCEDURE — 84443 ASSAY THYROID STIM HORMONE: CPT | Performed by: PEDIATRICS

## 2019-08-22 PROCEDURE — 84481 FREE ASSAY (FT-3): CPT | Performed by: PEDIATRICS

## 2019-08-22 PROCEDURE — 36415 COLL VENOUS BLD VENIPUNCTURE: CPT | Performed by: PEDIATRICS

## 2019-08-22 PROCEDURE — 84439 ASSAY OF FREE THYROXINE: CPT | Performed by: PEDIATRICS

## 2019-08-23 ENCOUNTER — TELEPHONE (OUTPATIENT)
Dept: PEDIATRICS | Facility: OTHER | Age: 19
End: 2019-08-23

## 2019-08-23 DIAGNOSIS — E05.90 HYPERTHYROIDISM: Primary | ICD-10-CM

## 2019-08-23 NOTE — TELEPHONE ENCOUNTER
Labs:  Results for orders placed or performed in visit on 08/22/19   TSH   Result Value Ref Range    TSH <0.01 (L) 0.40 - 4.00 mU/L   T4 free   Result Value Ref Range    T4 Free 1.85 (H) 0.76 - 1.46 ng/dL        Await pending T3 level. Left msg with mom that I will call Monday with recommendations for further evaluation and management as indicated.     Electronically signed by Leidy Carrizales MD.

## 2019-08-24 LAB — T3FREE SERPL-MCNC: 6.3 PG/ML (ref 2.3–4.2)

## 2019-08-25 NOTE — TELEPHONE ENCOUNTER
Labs:  Results for orders placed or performed in visit on 08/22/19   TSH   Result Value Ref Range    TSH <0.01 (L) 0.40 - 4.00 mU/L   T4 free   Result Value Ref Range    T4 Free 1.85 (H) 0.76 - 1.46 ng/dL   T3, Free   Result Value Ref Range    Free T3 6.3 (H) 2.3 - 4.2 pg/mL      Elevated free T4 and T3. Suppressed TSH. Will call mom Monday to discuss next steps.     Electronically signed by Leidy Carrizales MD.

## 2019-08-26 PROBLEM — E05.90 HYPERTHYROIDISM: Status: ACTIVE | Noted: 2019-08-26

## 2019-08-26 NOTE — TELEPHONE ENCOUNTER
Orders placed for Endocrine    Scheduled lab for 8 on 8/27 and left message on mom's phone about time. Lab does not open until then.

## 2019-08-26 NOTE — TELEPHONE ENCOUNTER
Spoke with mom. Recommend further lab tests to determine cause of hyperthyroidism. Please put on for labs at [facility] 8/27/19 at 7:30.     Recommend ADULT endocrine consult in the next 1 month(s) for diagnosis   1. Hyperthyroidism            Thanks,  Leidy Carrizales MD.

## 2019-08-27 DIAGNOSIS — E05.90 HYPERTHYROIDISM: ICD-10-CM

## 2019-08-27 LAB
ALBUMIN SERPL-MCNC: 3 G/DL (ref 3.4–5)
ALP SERPL-CCNC: 87 U/L (ref 40–150)
ALT SERPL W P-5'-P-CCNC: 27 U/L (ref 0–50)
ANION GAP SERPL CALCULATED.3IONS-SCNC: 8 MMOL/L (ref 3–14)
AST SERPL W P-5'-P-CCNC: 19 U/L (ref 0–45)
BASOPHILS # BLD AUTO: 0.1 10E9/L (ref 0–0.2)
BASOPHILS NFR BLD AUTO: 0.7 %
BILIRUB SERPL-MCNC: 0.4 MG/DL (ref 0.2–1.3)
BUN SERPL-MCNC: 11 MG/DL (ref 7–30)
CALCIUM SERPL-MCNC: 8.7 MG/DL (ref 8.5–10.1)
CHLORIDE SERPL-SCNC: 107 MMOL/L (ref 94–109)
CO2 SERPL-SCNC: 23 MMOL/L (ref 20–32)
CREAT SERPL-MCNC: 0.45 MG/DL (ref 0.52–1.04)
DIFFERENTIAL METHOD BLD: NORMAL
EOSINOPHIL NFR BLD AUTO: 1.7 %
ERYTHROCYTE [DISTWIDTH] IN BLOOD BY AUTOMATED COUNT: 12.4 % (ref 10–15)
GFR SERPL CREATININE-BSD FRML MDRD: >90 ML/MIN/{1.73_M2}
GLUCOSE SERPL-MCNC: 86 MG/DL (ref 70–99)
HCT VFR BLD AUTO: 42.3 % (ref 35–47)
HGB BLD-MCNC: 13.8 G/DL (ref 11.7–15.7)
IMM GRANULOCYTES # BLD: 0 10E9/L (ref 0–0.4)
IMM GRANULOCYTES NFR BLD: 0.6 %
LYMPHOCYTES # BLD AUTO: 2.9 10E9/L (ref 0.8–5.3)
LYMPHOCYTES NFR BLD AUTO: 40.2 %
MCH RBC QN AUTO: 27.1 PG (ref 26.5–33)
MCHC RBC AUTO-ENTMCNC: 32.6 G/DL (ref 31.5–36.5)
MCV RBC AUTO: 83 FL (ref 78–100)
MONOCYTES # BLD AUTO: 0.4 10E9/L (ref 0–1.3)
MONOCYTES NFR BLD AUTO: 5.9 %
NEUTROPHILS # BLD AUTO: 3.6 10E9/L (ref 1.6–8.3)
NEUTROPHILS NFR BLD AUTO: 50.9 %
NRBC # BLD AUTO: 0 10*3/UL
NRBC BLD AUTO-RTO: 0 /100
PLATELET # BLD AUTO: 333 10E9/L (ref 150–450)
POTASSIUM SERPL-SCNC: 4 MMOL/L (ref 3.4–5.3)
PROT SERPL-MCNC: 7.6 G/DL (ref 6.8–8.8)
RBC # BLD AUTO: 5.1 10E12/L (ref 3.8–5.2)
SODIUM SERPL-SCNC: 138 MMOL/L (ref 133–144)
T4 FREE SERPL-MCNC: 1.77 NG/DL (ref 0.76–1.46)
TSH SERPL DL<=0.005 MIU/L-ACNC: <0.01 MU/L (ref 0.4–4)
WBC # BLD AUTO: 7.1 10E9/L (ref 4–11)

## 2019-08-27 PROCEDURE — 36415 COLL VENOUS BLD VENIPUNCTURE: CPT | Performed by: PEDIATRICS

## 2019-08-27 PROCEDURE — 80053 COMPREHEN METABOLIC PANEL: CPT | Performed by: PEDIATRICS

## 2019-08-27 PROCEDURE — 85025 COMPLETE CBC W/AUTO DIFF WBC: CPT | Performed by: PEDIATRICS

## 2019-08-27 PROCEDURE — 86376 MICROSOMAL ANTIBODY EACH: CPT | Performed by: PEDIATRICS

## 2019-08-27 PROCEDURE — 84439 ASSAY OF FREE THYROXINE: CPT | Performed by: PEDIATRICS

## 2019-08-27 PROCEDURE — 84443 ASSAY THYROID STIM HORMONE: CPT | Performed by: PEDIATRICS

## 2019-08-27 PROCEDURE — 86800 THYROGLOBULIN ANTIBODY: CPT | Performed by: PEDIATRICS

## 2019-08-28 ENCOUNTER — TELEPHONE (OUTPATIENT)
Dept: ENDOCRINOLOGY | Facility: CLINIC | Age: 19
End: 2019-08-28

## 2019-08-28 LAB
THYROGLOB AB SERPL IA-ACNC: <20 IU/ML (ref 0–40)
THYROPEROXIDASE AB SERPL-ACNC: 101 IU/ML

## 2019-08-28 NOTE — TELEPHONE ENCOUNTER
Ok per Dr. Vallecillo for patient to be seen on 8/30/19.     Contacted patient's mother, Marine, to review. Appointment scheduled. Provided Marine with clinic address and phone number. Marine will call back with any questions or concerns.       Lilian Jay RN  Endocrine Care Coordinator  [facility]

## 2019-08-28 NOTE — TELEPHONE ENCOUNTER
Referral received for hyperthyroidism.    Component      Latest Ref Rng & Units 8/22/2019   TSH      0.40 - 4.00 mU/L <0.01 (L)   T4 Free      0.76 - 1.46 ng/dL 1.85 (H)   Free T3      2.3 - 4.2 pg/mL 6.3 (H)       Component      Latest Ref Rng & Units 8/27/2019   T4 Free      0.76 - 1.46 ng/dL 1.77 (H)   TSH      0.40 - 4.00 mU/L <0.01 (L)       Component      Latest Ref Rng & Units 8/27/2019   Thyroid Peroxidase Antibody      <35 IU/mL 101 (H)   Thyroglobulin Antibody      <40 IU/mL <20       TSI in process.         Contacted patient's mother, Marine, to offer office visit with Dr. Lao on 9/3/19. Marine states that she will take the appointment but would like to know if there is any way possible to get an appointment this Friday, 8/30/19. Marine works for [employer] system and goes back to work 9/3/19.    Will review with provider (Dr. Vallecillo) and contact Marine today or tomorrow with details. Will keep 9/3/29 appointment for now.       Lilian Jay, RN  Endocrine Care Coordinator  [facility]

## 2019-08-28 NOTE — TELEPHONE ENCOUNTER
----- Message from Grisel Langford sent at 8/28/2019 12:23 PM CDT -----  Pt is scheduled for 11/12 @ 230pm with Dr. Lao. Pt has dx of hyperthyroidism. Referral is from Leidy Carrizales.

## 2019-08-30 ENCOUNTER — OFFICE VISIT (OUTPATIENT)
Dept: ENDOCRINOLOGY | Facility: CLINIC | Age: 19
End: 2019-08-30
Payer: MEDICAID

## 2019-08-30 VITALS
SYSTOLIC BLOOD PRESSURE: 126 MMHG | BODY MASS INDEX: 25.24 KG/M2 | WEIGHT: 151.5 LBS | HEIGHT: 65 IN | DIASTOLIC BLOOD PRESSURE: 84 MMHG | OXYGEN SATURATION: 96 % | HEART RATE: 104 BPM

## 2019-08-30 DIAGNOSIS — E05.90 HYPERTHYROIDISM: ICD-10-CM

## 2019-08-30 DIAGNOSIS — E05.00 GRAVES' DISEASE: Primary | ICD-10-CM

## 2019-08-30 DIAGNOSIS — R00.0 TACHYCARDIA: ICD-10-CM

## 2019-08-30 LAB
T4 FREE SERPL-MCNC: 1.67 NG/DL (ref 0.76–1.46)
TSH SERPL DL<=0.005 MIU/L-ACNC: <0.01 MU/L (ref 0.4–4)
TSI SER-ACNC: 2.1 TSI INDEX

## 2019-08-30 PROCEDURE — 36415 COLL VENOUS BLD VENIPUNCTURE: CPT | Performed by: INTERNAL MEDICINE

## 2019-08-30 PROCEDURE — 83520 IMMUNOASSAY QUANT NOS NONAB: CPT | Mod: 90 | Performed by: INTERNAL MEDICINE

## 2019-08-30 PROCEDURE — 84480 ASSAY TRIIODOTHYRONINE (T3): CPT | Performed by: INTERNAL MEDICINE

## 2019-08-30 PROCEDURE — 99000 SPECIMEN HANDLING OFFICE-LAB: CPT | Performed by: INTERNAL MEDICINE

## 2019-08-30 PROCEDURE — 84445 ASSAY OF TSI GLOBULIN: CPT | Mod: 90 | Performed by: INTERNAL MEDICINE

## 2019-08-30 PROCEDURE — 84443 ASSAY THYROID STIM HORMONE: CPT | Performed by: INTERNAL MEDICINE

## 2019-08-30 PROCEDURE — 99204 OFFICE O/P NEW MOD 45 MIN: CPT | Performed by: INTERNAL MEDICINE

## 2019-08-30 PROCEDURE — 84439 ASSAY OF FREE THYROXINE: CPT | Performed by: INTERNAL MEDICINE

## 2019-08-30 RX ORDER — PROPRANOLOL HYDROCHLORIDE 20 MG/1
20 TABLET ORAL 2 TIMES DAILY
Qty: 180 TABLET | Refills: 3 | Status: SHIPPED | OUTPATIENT
Start: 2019-08-30 | End: 2020-01-24

## 2019-08-30 RX ORDER — METHIMAZOLE 5 MG/1
5 TABLET ORAL DAILY
Qty: 90 TABLET | Refills: 3 | Status: SHIPPED | OUTPATIENT
Start: 2019-08-30 | End: 2020-01-24

## 2019-08-30 ASSESSMENT — MIFFLIN-ST. JEOR: SCORE: 1451.2

## 2019-08-30 NOTE — LETTER
8/30/2019         RE: Maren Winchester  [address] [place] [address]        Dear Colleague,    Thank you for referring your patient, Maren Winchester, to the [facility]. Please see a copy of my visit note below.                                                                               - Endocrinology Initial Consultation -    Reason for visit/consult: Hypothyroidism    Primary care provider: Leidy Carrizales    HPI: A 19 yo female here for the consultation of her hyperthyroidism.  Patient came with her parents today.     Patient mentioned for the past 6 months she started to have mild dysphagia when she eats breakfast and some sore throat.  Also excessive fatigue, insomnia.  She also started to have eye itchiness past several months, she went to ophthalmologist her vision was okay.   She gained 5 pounds since April 2019 she is hungry was that time.   She usually has constipation using the laxative however she started to have some diarrhea.   Her mood tends to be more depressed recently and also increase anxiety level according to the parents.   Patient also has family history of thyroid condition in her maternal side.  Mother : hemithyroidectomy. Maternal aunt: goiter  maternal grandmother: DM    Past Medical/Surgical History:  Past Medical History:   Diagnosis Date     ADHD (attention deficit hyperactivity disorder)     [place] diagnosis     Autism     autism spectrum disorder     Mental retardation     [place] diagnosis     History reviewed. No pertinent surgical history.    Allergies:  No Known Allergies    Current Medications   Current Outpatient Medications   Medication     desogestrel-ethinyl estradiol (APRI) 0.15-30 MG-MCG tablet     docusate sodium (COLACE) 100 MG tablet     escitalopram (LEXAPRO) 20 MG tablet     Multiple Vitamins-Minerals (MULTIVITAL PO)     polyethylene glycol (MIRALAX) powder     fluticasone (FLONASE) 50 MCG/ACT spray     No current facility-administered  "medications for this visit.        Family History:  Family History   Problem Relation Age of Onset     Asthma No family hx of      Cerebrovascular Disease No family hx of      Depression No family hx of      Obesity No family hx of    Mother : hemithyroidectomy. 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 Otherwise a complete 14 point review of systems was taken and is negative unless stated in the history above.      Physical Exam:   /84 (BP Location: Left arm, Patient Position: Chair, Cuff Size: Adult Regular)   Pulse 104   Ht 1.64 m (5' 4.57\")   Wt 68.7 kg (151 lb 8 oz)   SpO2 96%   BMI 25.55 kg/m        General: well appearing, no acute distress, pleasant and conversant,   Mental Status/neuro: alert and oriented  Face: symmetrical, normal facial color  Eyes: anicteric, PERRL, no proptosis or lid lag  Neck: suppler, no lymphadenopahty  Thyroid: soft, mildly enlarged size (x1.5) and soft texture, fullness+, no nodule palpable, no bruits  Heart: regular rhythm, S1S2, no murmur appreciated  Lung: clear to auscultation bilaterally  Skin: Moist  Hands: Mild tremor bilateral   abdomen: soft, NT/ND, no hepatomegaly  Legs: no swelling or edema  Neuro exam: hyperreflexia       Labs : I reviewed data from epic and extract and summarize the pertinent data here.        Ref. Range 1/7/2016 14:21 1/24/2017 08:15 4/3/2019 16:42 8/22/2019 15:07 8/27/2019 07:46   T4 Free Latest Ref Range: 0.76 - 1.46 ng/dL 1.10 1.29 1.13 1.85 (H) 1.77 (H)   TSH Latest Ref Range: 0.40 - 4.00 mU/L 1.13 0.81 0.13 (L) <0.01 (L) <0.01 (L)   Free " T3 Latest Ref Range: 2.3 - 4.2 pg/mL    6.3 (H)    Thyroglobulin Antibody Latest Ref Range: <40 IU/mL     <20   Thyroid Peroxidase Antibody Latest Ref Range: <35 IU/mL     101 (H)     Lab Results   Component Value Date     08/27/2019      Lab Results   Component Value Date    POTASSIUM 4.0 08/27/2019     Lab Results   Component Value Date    CHLORIDE 107 08/27/2019     Lab Results   Component Value Date    MAIA 8.7 08/27/2019     Lab Results   Component Value Date    CO2 23 08/27/2019     Lab Results   Component Value Date    BUN 11 08/27/2019     Lab Results   Component Value Date    CR 0.45 08/27/2019     Lab Results   Component Value Date    GLC 86 08/27/2019     Lab Results   Component Value Date    TSH <0.01 08/27/2019     Lab Results   Component Value Date    T4 1.77 08/27/2019     Lab Results   Component Value Date    A1C 5.6 03/23/2015         Assessment and Plan  18 year old female with hyperthyroidism    # Hyperthyroidism  By reviewing previous her thyroid function test it seems started to occur earlier this year gradually.   By her clinical symptoms she most likely has Graves' disease.   We will send TSH, free T4, total T3 and antibodies, also will try methimazole 5 mg daily.    - Lab TSH, free T4, total T3, TSI, TSH receptor antibody  -Methimazole 5 mg daily to start  - Propranolol 20 mg BID (patient has mild tachycardia today)    # Follow up plan    Repeat TSH, free T4, total T3 in 2 month      Follow up with me in 2 month        I spent 45 minutes with this patient face to face and explained the conditions and plans (more than 50% of time was counseling/coordination of care) . The patient understood and is satisfied with today's visit. Return to clinic with me in 2 months.         Lashonda Vallecillo MD  Staff Physician  Endocrinology and Metabolism  License: [id number]    Again, thank you for allowing me to participate in the care of your patient.        Sincerely,        Lashonda Vallecillo MD

## 2019-08-30 NOTE — PROGRESS NOTES
- Endocrinology Initial Consultation -    Reason for visit/consult: Hypothyroidism    Primary care provider: Leidy Carrizales    HPI: A 17 yo female here for the consultation of her hyperthyroidism.  Patient came with her parents today.     Patient mentioned for the past 6 months she started to have mild dysphagia when she eats breakfast and some sore throat.  Also excessive fatigue, insomnia.  She also started to have eye itchiness past several months, she went to ophthalmologist her vision was okay.   She gained 5 pounds since April 2019 she is hungry was that time.   She usually has constipation using the laxative however she started to have some diarrhea.   Her mood tends to be more depressed recently and also increase anxiety level according to the parents.   Patient also has family history of thyroid condition in her maternal side.  Mother : hemithyroidectomy. Maternal aunt: goiter  maternal grandmother: DM    Past Medical/Surgical History:  Past Medical History:   Diagnosis Date     ADHD (attention deficit hyperactivity disorder)     [place] diagnosis     Autism     autism spectrum disorder     Mental retardation     [place] diagnosis     History reviewed. No pertinent surgical history.    Allergies:  No Known Allergies    Current Medications   Current Outpatient Medications   Medication     desogestrel-ethinyl estradiol (APRI) 0.15-30 MG-MCG tablet     docusate sodium (COLACE) 100 MG tablet     escitalopram (LEXAPRO) 20 MG tablet     Multiple Vitamins-Minerals (MULTIVITAL PO)     polyethylene glycol (MIRALAX) powder     fluticasone (FLONASE) 50 MCG/ACT spray     No current facility-administered medications for this visit.        Family History:  Family History   Problem Relation Age of Onset     Asthma No family hx of      Cerebrovascular Disease No family hx of      Depression No family hx of      Obesity No family hx of    Mother :  "hemithyroidectomy. Maternal aunt: goiter  maternal grandmother: DM    Social History:  Social History     Tobacco Use     Smoking status: Never Smoker     Smokeless tobacco: Never Used   Substance Use Topics     Alcohol use: No     Alcohol/week: 0.0 oz   Lives with parents, 1 brother and 1 sister  Sister: seizure disorder,   Brother: healthy  Transition program, [employer], cafeteria,     ROS:  Full review of systems taken with the help of the intake sheet. Otherwise a complete 14 point review of systems was taken and is negative unless stated in the history above.      Physical Exam:   /84 (BP Location: Left arm, Patient Position: Chair, Cuff Size: Adult Regular)   Pulse 104   Ht 1.64 m (5' 4.57\")   Wt 68.7 kg (151 lb 8 oz)   SpO2 96%   BMI 25.55 kg/m       General: well appearing, no acute distress, pleasant and conversant,   Mental Status/neuro: alert and oriented  Face: symmetrical, normal facial color  Eyes: anicteric, PERRL, no proptosis or lid lag  Neck: suppler, no lymphadenopahty  Thyroid: soft, mildly enlarged size (x1.5) and soft texture, fullness+, no nodule palpable, no bruits  Heart: regular rhythm, S1S2, no murmur appreciated  Lung: clear to auscultation bilaterally  Skin: Moist  Hands: Mild tremor bilateral   abdomen: soft, NT/ND, no hepatomegaly  Legs: no swelling or edema  Neuro exam: hyperreflexia       Labs : I reviewed data from epic and extract and summarize the pertinent data here.        Ref. Range 1/7/2016 14:21 1/24/2017 08:15 4/3/2019 16:42 8/22/2019 15:07 8/27/2019 07:46   T4 Free Latest Ref Range: 0.76 - 1.46 ng/dL 1.10 1.29 1.13 1.85 (H) 1.77 (H)   TSH Latest Ref Range: 0.40 - 4.00 mU/L 1.13 0.81 0.13 (L) <0.01 (L) <0.01 (L)   Free T3 Latest Ref Range: 2.3 - 4.2 pg/mL    6.3 (H)    Thyroglobulin Antibody Latest Ref Range: <40 IU/mL     <20   Thyroid Peroxidase Antibody Latest Ref Range: <35 IU/mL     101 (H)     Lab Results   Component Value Date     08/27/2019    "   Lab Results   Component Value Date    POTASSIUM 4.0 08/27/2019     Lab Results   Component Value Date    CHLORIDE 107 08/27/2019     Lab Results   Component Value Date    MAIA 8.7 08/27/2019     Lab Results   Component Value Date    CO2 23 08/27/2019     Lab Results   Component Value Date    BUN 11 08/27/2019     Lab Results   Component Value Date    CR 0.45 08/27/2019     Lab Results   Component Value Date    GLC 86 08/27/2019     Lab Results   Component Value Date    TSH <0.01 08/27/2019     Lab Results   Component Value Date    T4 1.77 08/27/2019     Lab Results   Component Value Date    A1C 5.6 03/23/2015         Assessment and Plan  18 year old female with hyperthyroidism    # Hyperthyroidism  By reviewing previous her thyroid function test it seems started to occur earlier this year gradually.   By her clinical symptoms she most likely has Graves' disease.   We will send TSH, free T4, total T3 and antibodies, also will try methimazole 5 mg daily.    - Lab TSH, free T4, total T3, TSI, TSH receptor antibody  -Methimazole 5 mg daily to start  - Propranolol 20 mg BID (patient has mild tachycardia today)    # Follow up plan    Repeat TSH, free T4, total T3 in 2 month      Follow up with me in 2 month        I spent 45 minutes with this patient face to face and explained the conditions and plans (more than 50% of time was counseling/coordination of care) . The patient understood and is satisfied with today's visit. Return to clinic with me in 2 months.         Lashonda Vallecillo MD  Staff Physician  Endocrinology and Metabolism  License: [id number]

## 2019-08-30 NOTE — NURSING NOTE
"Maren Winchester's goals for this visit include:   Chief Complaint   Patient presents with     Thyroid Disease     Hyperthyroid       She requests these members of her care team be copied on today's visit information: Yes    PCP: Leidy Carrizales    Referring Provider:  Leidy Carrizales MD  [address] [place] [address] [address]  [place] [address]    /84 (BP Location: Left arm, Patient Position: Chair, Cuff Size: Adult Regular)   Pulse 104   Ht 1.64 m (5' 4.57\")   Wt 68.7 kg (151 lb 8 oz)   SpO2 96%   BMI 25.55 kg/m      Do you need any medication refills at today's visit? No    "

## 2019-09-03 LAB — TSH RECEP AB SER-ACNC: 5.44 IU/L (ref 0–1.75)

## 2019-09-06 LAB — TSI SER-ACNC: 1.9 TSI INDEX

## 2019-10-11 DIAGNOSIS — E05.00 GRAVES' DISEASE: ICD-10-CM

## 2019-10-11 LAB
T3 SERPL-MCNC: 234 NG/DL (ref 60–181)
T4 FREE SERPL-MCNC: 1.41 NG/DL (ref 0.76–1.46)
TSH SERPL DL<=0.005 MIU/L-ACNC: <0.01 MU/L (ref 0.4–4)

## 2019-10-11 PROCEDURE — 84439 ASSAY OF FREE THYROXINE: CPT | Performed by: INTERNAL MEDICINE

## 2019-10-11 PROCEDURE — 84480 ASSAY TRIIODOTHYRONINE (T3): CPT | Performed by: INTERNAL MEDICINE

## 2019-10-11 PROCEDURE — 84443 ASSAY THYROID STIM HORMONE: CPT | Performed by: INTERNAL MEDICINE

## 2019-10-11 PROCEDURE — 36415 COLL VENOUS BLD VENIPUNCTURE: CPT | Performed by: INTERNAL MEDICINE

## 2019-10-18 ENCOUNTER — OFFICE VISIT (OUTPATIENT)
Dept: ENDOCRINOLOGY | Facility: CLINIC | Age: 19
End: 2019-10-18
Payer: MEDICAID

## 2019-10-18 VITALS
DIASTOLIC BLOOD PRESSURE: 74 MMHG | BODY MASS INDEX: 26.63 KG/M2 | WEIGHT: 157.9 LBS | SYSTOLIC BLOOD PRESSURE: 112 MMHG | OXYGEN SATURATION: 97 % | HEART RATE: 79 BPM

## 2019-10-18 DIAGNOSIS — E05.00 GRAVES' DISEASE: Primary | ICD-10-CM

## 2019-10-18 DIAGNOSIS — R00.0 TACHYCARDIA: ICD-10-CM

## 2019-10-18 DIAGNOSIS — R53.83 OTHER FATIGUE: ICD-10-CM

## 2019-10-18 DIAGNOSIS — E05.90 HYPERTHYROIDISM: ICD-10-CM

## 2019-10-18 PROCEDURE — 99214 OFFICE O/P EST MOD 30 MIN: CPT | Performed by: INTERNAL MEDICINE

## 2019-10-18 NOTE — PROGRESS NOTES
- Endocrinology follow up -    Reason for visit/consult: Hyperthyroidism, Graves disease    Primary care provider: Leidy Carrizales      Assessment and Plan  18 year old female with hyperthyroidism    # Grave' disease  TSI, TSH receptor Ab positive. Diagnosed Graves disease in August 2019 currently she is on methimazole 5 mg and propranolol 2 0 mg twice daily.  Her parents are taking care of her medication and that she is compliant.  She still mentioned fatigue, insomnia, however by examining her today her tachycardia resolved and she had a slight swelling to the size of goiter.  TSH is still suppressed however free T4 became normalized.  We will continue current dose and follow-up in 3 months.      - Lab TSH, free T4, total T3 prior to next visit  -Continue current dose of methimazole 5 mg daily   -Continue propranolol 20 mg BID     # Tachycardia  Resolved, currently taking propranolol 20 mg BID    # Follow up plan    Repeat TSH, free T4, total T3 in 2 month      Follow up with me in 3 month        I spent 30 minutes with this patient face to face and explained the conditions and plans (more than 50% of time was counseling/coordination of care) . The patient understood and is satisfied with today's visit. Return to clinic with me in 3 months.         Lashonda Vallecillo MD  Staff Physician  Endocrinology and Metabolism  License: [id number]    Interval History as of 10/18/2019 Diagnosed in August 2019 currently she is on methimazole 5 mg and propranolol 2 0 mg twice daily.  Her parents are taking care of her medication and that she is compliant.  She still mentioned fatigue, insomnia, however by examining her today her tachycardia resolved and she had a slight swelling to the size of goiter.  TSH is still suppressed however free T4 became normalized.   HPI: A 17 yo female here for the consultation of her hyperthyroidism.  Patient came with her parents  today.     Patient mentioned for the past 6 months she started to have mild dysphagia when she eats breakfast and some sore throat.  Also excessive fatigue, insomnia.  She also started to have eye itchiness past several months, she went to ophthalmologist her vision was okay.   She gained 5 pounds since April 2019 she is hungry was that time.   She usually has constipation using the laxative however she started to have some diarrhea.   Her mood tends to be more depressed recently and also increase anxiety level according to the parents.   Patient also has family history of thyroid condition in her maternal side.  Mother : hemithyroidectomy. Maternal aunt: goiter  maternal grandmother: DM    Past Medical/Surgical History:  Past Medical History:   Diagnosis Date     ADHD (attention deficit hyperactivity disorder)     [place] diagnosis     Autism     autism spectrum disorder     Mental retardation     [place] diagnosis     History reviewed. No pertinent surgical history.    Allergies:  No Known Allergies    Current Medications   Current Outpatient Medications   Medication     desogestrel-ethinyl estradiol (APRI) 0.15-30 MG-MCG tablet     docusate sodium (COLACE) 100 MG tablet     escitalopram (LEXAPRO) 20 MG tablet     methimazole (TAPAZOLE) 5 MG tablet     Multiple Vitamins-Minerals (MULTIVITAL PO)     propranolol (INDERAL) 20 MG tablet     fluticasone (FLONASE) 50 MCG/ACT spray     polyethylene glycol (MIRALAX) powder     No current facility-administered medications for this visit.        Family History:  Family History   Problem Relation Age of Onset     Asthma No family hx of      Cerebrovascular Disease No family hx of      Depression No family hx of      Obesity No family hx of    Mother : hemithyroidectomy. Maternal aunt: goiter  maternal grandmother: DM    Social History:  Social History     Tobacco Use     Smoking status: Never Smoker     Smokeless tobacco: Never Used   Substance Use Topics     Alcohol use: No      Alcohol/week: 0.0 standard drinks   Lives with parents, 1 brother and 1 sister  Sister: seizure disorder,   Brother: healthy  Transition program, [employer], cafeteria,     ROS:  Full review of systems taken with the help of the intake sheet. Otherwise a complete 14 point review of systems was taken and is negative unless stated in the history above.      Physical Exam:   /74 (BP Location: Left arm, Patient Position: Chair, Cuff Size: Adult Regular)   Pulse 79   Wt 71.6 kg (157 lb 14.4 oz)   SpO2 97%   BMI 26.63 kg/m       General: well appearing, no acute distress, pleasant and conversant,   Mental Status/neuro: alert and oriented  Face: symmetrical, normal facial color  Eyes: anicteric, PERRL, no proptosis or lid lag  Neck: suppler, no lymphadenopahty  Thyroid: soft, mildly enlarged size but shrank the side. and soft texture, no nodule palpable, no bruits  Heart: regular rhythm, S1S2, no murmur appreciated  Lung: clear to auscultation bilaterally  Skin: Moist  Hands: Mild tremor bilateral (improved)  abdomen: soft, NT/ND, no hepatomegaly  Legs: no swelling or edema  Neuro exam: hyperreflexia       Labs : I reviewed data from epic and extract and summarize the pertinent data here.      8/30/2019 12:21   Thyrotropin Receptor Antibody 5.44 (H)      Ref. Range 8/30/2019 12:21   Thyroid Stim Immunog Latest Ref Range: <=1.3 TSI index 1.9 (H)        10/11/2019 15:06   T4 Free 1.41   Triiodothyronine (T3) 234 (H)   TSH <0.01 (L)        Ref. Range 1/7/2016 14:21 1/24/2017 08:15 4/3/2019 16:42 8/22/2019 15:07 8/27/2019 07:46   T4 Free Latest Ref Range: 0.76 - 1.46 ng/dL 1.10 1.29 1.13 1.85 (H) 1.77 (H)   TSH Latest Ref Range: 0.40 - 4.00 mU/L 1.13 0.81 0.13 (L) <0.01 (L) <0.01 (L)   Free T3 Latest Ref Range: 2.3 - 4.2 pg/mL    6.3 (H)    Thyroglobulin Antibody Latest Ref Range: <40 IU/mL     <20   Thyroid Peroxidase Antibody Latest Ref Range: <35 IU/mL     101 (H)     Lab Results   Component Value Date      08/27/2019      Lab Results   Component Value Date    POTASSIUM 4.0 08/27/2019     Lab Results   Component Value Date    CHLORIDE 107 08/27/2019     Lab Results   Component Value Date    MAIA 8.7 08/27/2019     Lab Results   Component Value Date    CO2 23 08/27/2019     Lab Results   Component Value Date    BUN 11 08/27/2019     Lab Results   Component Value Date    CR 0.45 08/27/2019     Lab Results   Component Value Date    GLC 86 08/27/2019     Lab Results   Component Value Date    TSH <0.01 08/27/2019     Lab Results   Component Value Date    T4 1.77 08/27/2019     Lab Results   Component Value Date    A1C 5.6 03/23/2015

## 2019-10-18 NOTE — NURSING NOTE
Maren Winchester's goals for this visit include:   Chief Complaint   Patient presents with     Thyroid Disease     2 month follow up       She requests these members of her care team be copied on today's visit information: Yes    PCP: Trent Colye    Referring Provider:  No referring provider defined for this encounter.    /74 (BP Location: Left arm, Patient Position: Chair, Cuff Size: Adult Regular)   Pulse 79   Wt 71.6 kg (157 lb 14.4 oz)   SpO2 97%   BMI 26.63 kg/m      Do you need any medication refills at today's visit? No

## 2019-10-18 NOTE — LETTER
10/18/2019         RE: Maren Winchester  [address] [place] [address]        Dear Colleague,    Thank you for referring your patient, Maren Winchester, to the [facility]. Please see a copy of my visit note below.                                                                               - Endocrinology follow up -    Reason for visit/consult: Hyperthyroidism, Graves disease    Primary care provider: Leidy Carrizales      Assessment and Plan  18 year old female with hyperthyroidism    # Grave' disease  TSI, TSH receptor Ab positive. Diagnosed Graves disease in August 2019 currently she is on methimazole 5 mg and propranolol 2 0 mg twice daily.  Her parents are taking care of her medication and that she is compliant.  She still mentioned fatigue, insomnia, however by examining her today her tachycardia resolved and she had a slight swelling to the size of goiter.  TSH is still suppressed however free T4 became normalized.  We will continue current dose and follow-up in 3 months.      - Lab TSH, free T4, total T3 prior to next visit  -Continue current dose of methimazole 5 mg daily   -Continue propranolol 20 mg BID     # Tachycardia  Resolved, currently taking propranolol 20 mg BID    # Follow up plan    Repeat TSH, free T4, total T3 in 2 month      Follow up with me in 3 month        I spent 30 minutes with this patient face to face and explained the conditions and plans (more than 50% of time was counseling/coordination of care) . The patient understood and is satisfied with today's visit. Return to clinic with me in 3 months.         Lashonda Vallecillo MD  Staff Physician  Endocrinology and Metabolism  License: [id number]    Interval History as of 10/18/2019 Diagnosed in August 2019 currently she is on methimazole 5 mg and propranolol 2 0 mg twice daily.  Her parents are taking care of her medication and that she is compliant.  She still mentioned fatigue, insomnia, however by examining  her today her tachycardia resolved and she had a slight swelling to the size of goiter.  TSH is still suppressed however free T4 became normalized.   HPI: A 17 yo female here for the consultation of her hyperthyroidism.  Patient came with her parents today.     Patient mentioned for the past 6 months she started to have mild dysphagia when she eats breakfast and some sore throat.  Also excessive fatigue, insomnia.  She also started to have eye itchiness past several months, she went to ophthalmologist her vision was okay.   She gained 5 pounds since April 2019 she is hungry was that time.   She usually has constipation using the laxative however she started to have some diarrhea.   Her mood tends to be more depressed recently and also increase anxiety level according to the parents.   Patient also has family history of thyroid condition in her maternal side.  Mother : hemithyroidectomy. Maternal aunt: goiter  maternal grandmother: DM    Past Medical/Surgical History:  Past Medical History:   Diagnosis Date     ADHD (attention deficit hyperactivity disorder)     [place] diagnosis     Autism     autism spectrum disorder     Mental retardation     [place] diagnosis     History reviewed. No pertinent surgical history.    Allergies:  No Known Allergies    Current Medications   Current Outpatient Medications   Medication     desogestrel-ethinyl estradiol (APRI) 0.15-30 MG-MCG tablet     docusate sodium (COLACE) 100 MG tablet     escitalopram (LEXAPRO) 20 MG tablet     methimazole (TAPAZOLE) 5 MG tablet     Multiple Vitamins-Minerals (MULTIVITAL PO)     propranolol (INDERAL) 20 MG tablet     fluticasone (FLONASE) 50 MCG/ACT spray     polyethylene glycol (MIRALAX) powder     No current facility-administered medications for this visit.        Family History:  Family History   Problem Relation Age of Onset     Asthma No family hx of      Cerebrovascular Disease No family hx of      Depression No family hx of      Obesity No family  hx of    Mother : hemithyroidectomy. Maternal aunt: goiter  maternal grandmother: DM    Social History:  Social History     Tobacco Use     Smoking status: Never Smoker     Smokeless tobacco: Never Used   Substance Use Topics     Alcohol use: No     Alcohol/week: 0.0 standard drinks   Lives with parents, 1 brother and 1 sister  Sister: seizure disorder,   Brother: healthy  Transition program, [employer] store, cafeteria,     ROS:  Full review of systems taken with the help of the intake sheet. Otherwise a complete 14 point review of systems was taken and is negative unless stated in the history above.      Physical Exam:   /74 (BP Location: Left arm, Patient Position: Chair, Cuff Size: Adult Regular)   Pulse 79   Wt 71.6 kg (157 lb 14.4 oz)   SpO2 97%   BMI 26.63 kg/m        General: well appearing, no acute distress, pleasant and conversant,   Mental Status/neuro: alert and oriented  Face: symmetrical, normal facial color  Eyes: anicteric, PERRL, no proptosis or lid lag  Neck: suppler, no lymphadenopahty  Thyroid: soft, mildly enlarged size but shrank the side. and soft texture, no nodule palpable, no bruits  Heart: regular rhythm, S1S2, no murmur appreciated  Lung: clear to auscultation bilaterally  Skin: Moist  Hands: Mild tremor bilateral (improved)  abdomen: soft, NT/ND, no hepatomegaly  Legs: no swelling or edema  Neuro exam: hyperreflexia       Labs : I reviewed data from epic and extract and summarize the pertinent data here.      8/30/2019 12:21   Thyrotropin Receptor Antibody 5.44 (H)      Ref. Range 8/30/2019 12:21   Thyroid Stim Immunog Latest Ref Range: <=1.3 TSI index 1.9 (H)        10/11/2019 15:06   T4 Free 1.41   Triiodothyronine (T3) 234 (H)   TSH <0.01 (L)        Ref. Range 1/7/2016 14:21 1/24/2017 08:15 4/3/2019 16:42 8/22/2019 15:07 8/27/2019 07:46   T4 Free Latest Ref Range: 0.76 - 1.46 ng/dL 1.10 1.29 1.13 1.85 (H) 1.77 (H)   TSH Latest Ref Range: 0.40 - 4.00 mU/L 1.13 0.81 0.13 (L)  <0.01 (L) <0.01 (L)   Free T3 Latest Ref Range: 2.3 - 4.2 pg/mL    6.3 (H)    Thyroglobulin Antibody Latest Ref Range: <40 IU/mL     <20   Thyroid Peroxidase Antibody Latest Ref Range: <35 IU/mL     101 (H)     Lab Results   Component Value Date     08/27/2019      Lab Results   Component Value Date    POTASSIUM 4.0 08/27/2019     Lab Results   Component Value Date    CHLORIDE 107 08/27/2019     Lab Results   Component Value Date    MAIA 8.7 08/27/2019     Lab Results   Component Value Date    CO2 23 08/27/2019     Lab Results   Component Value Date    BUN 11 08/27/2019     Lab Results   Component Value Date    CR 0.45 08/27/2019     Lab Results   Component Value Date    GLC 86 08/27/2019     Lab Results   Component Value Date    TSH <0.01 08/27/2019     Lab Results   Component Value Date    T4 1.77 08/27/2019     Lab Results   Component Value Date    A1C 5.6 03/23/2015           Again, thank you for allowing me to participate in the care of your patient.        Sincerely,        Lashonda Vallecillo MD

## 2019-12-03 ENCOUNTER — IMMUNIZATION (OUTPATIENT)
Dept: FAMILY MEDICINE | Facility: CLINIC | Age: 19
End: 2019-12-03
Payer: MEDICAID

## 2019-12-03 PROCEDURE — 90686 IIV4 VACC NO PRSV 0.5 ML IM: CPT

## 2019-12-03 PROCEDURE — 90471 IMMUNIZATION ADMIN: CPT

## 2019-12-18 DIAGNOSIS — N92.0 MENORRHAGIA WITH REGULAR CYCLE: ICD-10-CM

## 2019-12-18 RX ORDER — DESOGESTREL AND ETHINYL ESTRADIOL 0.15-0.03
1 KIT ORAL DAILY
Qty: 112 TABLET | Refills: 3 | Status: SHIPPED | OUTPATIENT
Start: 2019-12-18 | End: 2020-11-23

## 2019-12-18 NOTE — TELEPHONE ENCOUNTER
"[facility]  Last Written Prescription Date:  06/18/2019  Last Fill Quantity: 112,  # refills: 3   Last office visit: 08/19/2019 with prescribing provider:  Fair   Future Office Visit:   Next 5 appointments (look out 90 days)    Jan 24, 2020 11:30 AM CST  Return Visit with Lashonda Vallecillo MD  [facility] [address] [place] [id number] [phone]      Prescription approved per [facility] Refill Protocol.    Requested Prescriptions   Pending Prescriptions Disp Refills     desogestrel-ethinyl estradiol (APRI) 0.15-30 MG-MCG tablet 112 tablet 3     Sig: Take 1 tablet by mouth daily Skip placebo week except for every 3rd month       Contraceptives Protocol Failed - 12/18/2019 11:45 AM        Failed - Recent (12 mo) or future (30 days) visit within the authorizing provider's specialty     Patient has had an office visit with the authorizing provider or a provider within the authorizing providers department within the previous 12 mos or has a future within next 30 days. See \"Patient Info\" tab in inbasket, or \"Choose Columns\" in Meds & Orders section of the refill encounter.          Passed - Patient is not a current smoker if age is 35 or older        Passed - Medication is active on med list        Passed - No active pregnancy on record        Passed - No positive pregnancy test in past 12 months      Maryellen Enrique RN   "

## 2020-01-17 DIAGNOSIS — E05.00 GRAVES' DISEASE: ICD-10-CM

## 2020-01-17 LAB
T3 SERPL-MCNC: 200 NG/DL (ref 60–181)
T4 FREE SERPL-MCNC: 1.33 NG/DL (ref 0.76–1.46)
TSH SERPL DL<=0.005 MIU/L-ACNC: <0.01 MU/L (ref 0.4–4)

## 2020-01-17 PROCEDURE — 84480 ASSAY TRIIODOTHYRONINE (T3): CPT | Performed by: INTERNAL MEDICINE

## 2020-01-17 PROCEDURE — 84443 ASSAY THYROID STIM HORMONE: CPT | Performed by: INTERNAL MEDICINE

## 2020-01-17 PROCEDURE — 36415 COLL VENOUS BLD VENIPUNCTURE: CPT | Performed by: INTERNAL MEDICINE

## 2020-01-17 PROCEDURE — 84439 ASSAY OF FREE THYROXINE: CPT | Performed by: INTERNAL MEDICINE

## 2020-01-24 ENCOUNTER — OFFICE VISIT (OUTPATIENT)
Dept: ENDOCRINOLOGY | Facility: CLINIC | Age: 20
End: 2020-01-24
Payer: MEDICAID

## 2020-01-24 VITALS
HEIGHT: 65 IN | SYSTOLIC BLOOD PRESSURE: 102 MMHG | OXYGEN SATURATION: 96 % | WEIGHT: 163.38 LBS | BODY MASS INDEX: 27.22 KG/M2 | HEART RATE: 67 BPM | DIASTOLIC BLOOD PRESSURE: 70 MMHG

## 2020-01-24 DIAGNOSIS — E05.90 HYPERTHYROIDISM: ICD-10-CM

## 2020-01-24 DIAGNOSIS — E05.00 GRAVES' DISEASE: Primary | ICD-10-CM

## 2020-01-24 PROCEDURE — 99214 OFFICE O/P EST MOD 30 MIN: CPT | Performed by: INTERNAL MEDICINE

## 2020-01-24 RX ORDER — METHIMAZOLE 5 MG/1
5 TABLET ORAL DAILY
Qty: 90 TABLET | Refills: 3 | Status: SHIPPED | OUTPATIENT
Start: 2020-01-24 | End: 2020-07-24

## 2020-01-24 RX ORDER — PROPRANOLOL HYDROCHLORIDE 20 MG/1
20 TABLET ORAL 2 TIMES DAILY
Qty: 180 TABLET | Refills: 3 | Status: SHIPPED | OUTPATIENT
Start: 2020-01-24 | End: 2020-09-03

## 2020-01-24 ASSESSMENT — MIFFLIN-ST. JEOR: SCORE: 1499.44

## 2020-01-24 NOTE — PROGRESS NOTES
- Endocrinology follow up -    Reason for visit/consult: Hyperthyroidism, Graves disease    Primary care provider: Leidy Carrizales      Assessment and Plan  19 year old female with hyperthyroidism    # Grave' disease  TSI, TSH receptor Ab positive. Diagnosed Graves disease in August 2019 currently she is on methimazole 5 mg and propranolol 2 0 mg twice daily.  Her parents are taking care of her medication and that she is compliant.    TSH is still suppressed however free T4 became normalized since 10/2019, more stable currently.  We will continue current dose and follow-up in 3 months.      - Lab TSH, free T4, total T3, TSI, TSH receptor  prior to next visit  -Continue current dose of methimazole 5 mg daily   -Continue propranolol 20 mg BID     - May consider to reduce dose next visit.     - Also instructed patient and family for the warning signs of hypothyroidism.     # Tachycardia  Resolved, currently taking propranolol 20 mg BID        Follow up with me in 6 month        I spent 30 minutes with this patient face to face and explained the conditions and plans (more than 50% of time was counseling/coordination of care) . The patient understood and is satisfied with today's visit. Return to clinic with me in 6 months.         Lashonda Vallecillo MD  Staff Physician  Endocrinology and Metabolism  License: [id number]    Interval History as of 1/24/2020 : Patient has been doing well.  Medication compliance excellent  . New event includes : occasional HA, no N/V. No palpitation. eue symptoms getting better. 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   Interval History as of 10/18/2019 Diagnosed in August 2019 currently she is on methimazole 5 mg and propranolol 2 0 mg twice daily.  Her parents are taking care of her medication and that she is compliant.  She still mentioned fatigue, insomnia, however by examining her today her tachycardia resolved and  she had a slight swelling to the size of goiter.  TSH is still suppressed however free T4 became normalized.   HPI: A 17 yo female here for the consultation of her hyperthyroidism.  Patient came with her parents today.     Patient mentioned for the past 6 months she started to have mild dysphagia when she eats breakfast and some sore throat.  Also excessive fatigue, insomnia.  She also started to have eye itchiness past several months, she went to ophthalmologist her vision was okay.   She gained 5 pounds since April 2019 she is hungry was that time.   She usually has constipation using the laxative however she started to have some diarrhea.   Her mood tends to be more depressed recently and also increase anxiety level according to the parents.   Patient also has family history of thyroid condition in her maternal side.  Mother : hemithyroidectomy. Maternal aunt: goiter  maternal grandmother: DM    Past Medical/Surgical History:  Past Medical History:   Diagnosis Date     ADHD (attention deficit hyperactivity disorder)     [place] diagnosis     Autism     autism spectrum disorder     Mental retardation     [place] diagnosis     No past surgical history on file.    Allergies:  No Known Allergies    Current Medications   Current Outpatient Medications   Medication     desogestrel-ethinyl estradiol (APRI) 0.15-30 MG-MCG tablet     docusate sodium (COLACE) 100 MG tablet     escitalopram (LEXAPRO) 20 MG tablet     methimazole (TAPAZOLE) 5 MG tablet     Multiple Vitamins-Minerals (MULTIVITAL PO)     polyethylene glycol (MIRALAX) powder     propranolol (INDERAL) 20 MG tablet     No current facility-administered medications for this visit.        Family History:  Family History   Problem Relation Age of Onset     Asthma No family hx of      Cerebrovascular Disease No family hx of      Depression No family hx of      Obesity No family hx of    Mother : hemithyroidectomy. Maternal aunt: goiter  maternal grandmother: STEFFANIE    Social  "History:  Social History     Tobacco Use     Smoking status: Never Smoker     Smokeless tobacco: Never Used   Substance Use Topics     Alcohol use: No     Alcohol/week: 0.0 standard drinks   Lives with parents, 1 brother and 1 sister  Sister: seizure disorder,   Brother: healthy  Transition program, [employer] store, cafeteria,     ROS:  Full review of systems taken with the help of the intake sheet. Otherwise a complete 14 point review of systems was taken and is negative unless stated in the history above.      Physical Exam:   /70   Pulse 67   Ht 1.639 m (5' 4.53\")   Wt 74.1 kg (163 lb 6 oz)   LMP 01/25/2019   SpO2 96%   BMI 27.59 kg/m       General: well appearing, no acute distress, pleasant and conversant,   Mental Status/neuro: alert and oriented  Face: symmetrical, normal facial color  Eyes: anicteric, PERRL, no proptosis or lid lag  Neck: suppler, no lymphadenopahty  Thyroid: soft, mildly enlarged size but shrank the side. and soft texture, no nodule palpable, no bruits  Heart: regular rhythm, S1S2, no murmur appreciated  Lung: clear to auscultation bilaterally  Skin: Moist  Hands: Mild tremor bilateral (improved)  abdomen: soft, NT/ND, no hepatomegaly  Legs: no swelling or edema  Neuro exam: hyperreflexia       Labs : I reviewed data from epic and extract and summarize the pertinent data here.      8/30/2019 12:21   Thyrotropin Receptor Antibody 5.44 (H)      Ref. Range 8/30/2019 12:21   Thyroid Stim Immunog Latest Ref Range: <=1.3 TSI index 1.9 (H)        10/11/2019 15:06   T4 Free 1.41   Triiodothyronine (T3) 234 (H)   TSH <0.01 (L)        Ref. Range 1/7/2016 14:21 1/24/2017 08:15 4/3/2019 16:42 8/22/2019 15:07 8/27/2019 07:46   T4 Free Latest Ref Range: 0.76 - 1.46 ng/dL 1.10 1.29 1.13 1.85 (H) 1.77 (H)   TSH Latest Ref Range: 0.40 - 4.00 mU/L 1.13 0.81 0.13 (L) <0.01 (L) <0.01 (L)   Free T3 Latest Ref Range: 2.3 - 4.2 pg/mL    6.3 (H)    Thyroglobulin Antibody Latest Ref Range: <40 IU/mL     " <20   Thyroid Peroxidase Antibody Latest Ref Range: <35 IU/mL     101 (H)     Lab Results   Component Value Date     08/27/2019      Lab Results   Component Value Date    POTASSIUM 4.0 08/27/2019     Lab Results   Component Value Date    CHLORIDE 107 08/27/2019     Lab Results   Component Value Date    MAIA 8.7 08/27/2019     Lab Results   Component Value Date    CO2 23 08/27/2019     Lab Results   Component Value Date    BUN 11 08/27/2019     Lab Results   Component Value Date    CR 0.45 08/27/2019     Lab Results   Component Value Date    GLC 86 08/27/2019     Lab Results   Component Value Date    TSH <0.01 08/27/2019     Lab Results   Component Value Date    T4 1.77 08/27/2019     Lab Results   Component Value Date    A1C 5.6 03/23/2015

## 2020-01-24 NOTE — NURSING NOTE
"Maren Winchester's goals for this visit include: Follow up Graves  She requests these members of her care team be copied on today's visit information: YES    PCP: Trent Coley    Referring Provider:  No referring provider defined for this encounter.    Ht 1.639 m (5' 4.53\")   Wt 74.1 kg (163 lb 6 oz)   LMP 01/25/2019   BMI 27.59 kg/m      Do you need any medication refills at today's visit?NO    "

## 2020-01-24 NOTE — LETTER
1/24/2020         RE: Maren Winchester  [address] [facility] [address]        Dear Colleague,    Thank you for referring your patient, Maren Winchester, to the [facility]. Please see a copy of my visit note below.                                                                               - Endocrinology follow up -    Reason for visit/consult: Hyperthyroidism, Graves disease    Primary care provider: Leidy Carrizales      Assessment and Plan  19 year old female with hyperthyroidism    # Grave' disease  TSI, TSH receptor Ab positive. Diagnosed Graves disease in August 2019 currently she is on methimazole 5 mg and propranolol 2 0 mg twice daily.  Her parents are taking care of her medication and that she is compliant.    TSH is still suppressed however free T4 became normalized since 10/2019, more stable currently.  We will continue current dose and follow-up in 3 months.      - Lab TSH, free T4, total T3, TSI, TSH receptor  prior to next visit  -Continue current dose of methimazole 5 mg daily   -Continue propranolol 20 mg BID     - May consider to reduce dose next visit.     - Also instructed patient and family for the warning signs of hypothyroidism.     # Tachycardia  Resolved, currently taking propranolol 20 mg BID        Follow up with me in 6 month        I spent 30 minutes with this patient face to face and explained the conditions and plans (more than 50% of time was counseling/coordination of care) . The patient understood and is satisfied with today's visit. Return to clinic with me in 6 months.         Lashonda Vallecillo MD  Staff Physician  Endocrinology and Metabolism  License: [id number]    Interval History as of 1/24/2020 : Patient has been doing well.  Medication compliance excellent  . New event includes : occasional HA, no N/V. No palpitation. eue symptoms getting better. 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   Interval History as of 10/18/2019 Diagnosed in August  2019 currently she is on methimazole 5 mg and propranolol 2 0 mg twice daily.  Her parents are taking care of her medication and that she is compliant.  She still mentioned fatigue, insomnia, however by examining her today her tachycardia resolved and she had a slight swelling to the size of goiter.  TSH is still suppressed however free T4 became normalized.   HPI: A 19 yo female here for the consultation of her hyperthyroidism.  Patient came with her parents today.     Patient mentioned for the past 6 months she started to have mild dysphagia when she eats breakfast and some sore throat.  Also excessive fatigue, insomnia.  She also started to have eye itchiness past several months, she went to ophthalmologist her vision was okay.   She gained 5 pounds since April 2019 she is hungry was that time.   She usually has constipation using the laxative however she started to have some diarrhea.   Her mood tends to be more depressed recently and also increase anxiety level according to the parents.   Patient also has family history of thyroid condition in her maternal side.  Mother : hemithyroidectomy. Maternal aunt: goiter  maternal grandmother: DM    Past Medical/Surgical History:  Past Medical History:   Diagnosis Date     ADHD (attention deficit hyperactivity disorder)     [place] diagnosis     Autism     autism spectrum disorder     Mental retardation     [place] diagnosis     No past surgical history on file.    Allergies:  No Known Allergies    Current Medications   Current Outpatient Medications   Medication     desogestrel-ethinyl estradiol (APRI) 0.15-30 MG-MCG tablet     docusate sodium (COLACE) 100 MG tablet     escitalopram (LEXAPRO) 20 MG tablet     methimazole (TAPAZOLE) 5 MG tablet     Multiple Vitamins-Minerals (MULTIVITAL PO)     polyethylene glycol (MIRALAX) powder     propranolol (INDERAL) 20 MG tablet     No current facility-administered medications for this visit.        Family History:  Family History  "  Problem Relation Age of Onset     Asthma No family hx of      Cerebrovascular Disease No family hx of      Depression No family hx of      Obesity No family hx of    Mother : hemithyroidectomy. 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 Otherwise a complete 14 point review of systems was taken and is negative unless stated in the history above.      Physical Exam:   /70   Pulse 67   Ht 1.639 m (5' 4.53\")   Wt 74.1 kg (163 lb 6 oz)   LMP 01/25/2019   SpO2 96%   BMI 27.59 kg/m        General: well appearing, no acute distress, pleasant and conversant,   Mental Status/neuro: alert and oriented  Face: symmetrical, normal facial color  Eyes: anicteric, PERRL, no proptosis or lid lag  Neck: suppler, no lymphadenopahty  Thyroid: soft, mildly enlarged size but shrank the side. and soft texture, no nodule palpable, no bruits  Heart: regular rhythm, S1S2, no murmur appreciated  Lung: clear to auscultation bilaterally  Skin: Moist  Hands: Mild tremor bilateral (improved)  abdomen: soft, NT/ND, no hepatomegaly  Legs: no swelling or edema  Neuro exam: hyperreflexia       Labs : I reviewed data from epic and extract and summarize the pertinent data here.      8/30/2019 12:21   Thyrotropin Receptor Antibody 5.44 (H)      Ref. Range 8/30/2019 12:21   Thyroid Stim Immunog Latest Ref Range: <=1.3 TSI index 1.9 (H)        10/11/2019 15:06   T4 Free 1.41   Triiodothyronine (T3) 234 (H)   TSH <0.01 (L)        Ref. Range 1/7/2016 14:21 1/24/2017 08:15 4/3/2019 16:42 8/22/2019 15:07 8/27/2019 07:46   T4 Free Latest " Ref Range: 0.76 - 1.46 ng/dL 1.10 1.29 1.13 1.85 (H) 1.77 (H)   TSH Latest Ref Range: 0.40 - 4.00 mU/L 1.13 0.81 0.13 (L) <0.01 (L) <0.01 (L)   Free T3 Latest Ref Range: 2.3 - 4.2 pg/mL    6.3 (H)    Thyroglobulin Antibody Latest Ref Range: <40 IU/mL     <20   Thyroid Peroxidase Antibody Latest Ref Range: <35 IU/mL     101 (H)     Lab Results   Component Value Date     08/27/2019      Lab Results   Component Value Date    POTASSIUM 4.0 08/27/2019     Lab Results   Component Value Date    CHLORIDE 107 08/27/2019     Lab Results   Component Value Date    MAIA 8.7 08/27/2019     Lab Results   Component Value Date    CO2 23 08/27/2019     Lab Results   Component Value Date    BUN 11 08/27/2019     Lab Results   Component Value Date    CR 0.45 08/27/2019     Lab Results   Component Value Date    GLC 86 08/27/2019     Lab Results   Component Value Date    TSH <0.01 08/27/2019     Lab Results   Component Value Date    T4 1.77 08/27/2019     Lab Results   Component Value Date    A1C 5.6 03/23/2015           Again, thank you for allowing me to participate in the care of your patient.        Sincerely,        Lashonda Vallecillo MD

## 2020-02-25 DIAGNOSIS — F41.1 GAD (GENERALIZED ANXIETY DISORDER): ICD-10-CM

## 2020-02-25 RX ORDER — ESCITALOPRAM OXALATE 20 MG/1
20 TABLET ORAL DAILY
Qty: 90 TABLET | Refills: 0 | Status: SHIPPED | OUTPATIENT
Start: 2020-02-25 | End: 2020-05-28

## 2020-02-25 NOTE — TELEPHONE ENCOUNTER
ROSEMARY REFILL: Medication is being filled for 90 day supply only due to: due for visit for mood.      Torsten Shane, RN, BSN

## 2020-03-02 ENCOUNTER — HEALTH MAINTENANCE LETTER (OUTPATIENT)
Age: 20
End: 2020-03-02

## 2020-03-10 ENCOUNTER — TELEPHONE (OUTPATIENT)
Dept: FAMILY MEDICINE | Facility: CLINIC | Age: 20
End: 2020-03-10

## 2020-03-10 NOTE — TELEPHONE ENCOUNTER
Please triage, we are unable to see her today or tomorrow and he is only clinic those days. We have never seen the pt before.  Leidy Alexandra MA

## 2020-03-10 NOTE — TELEPHONE ENCOUNTER
Mom was called, Dr. Coley is unable to work into clinic as he is going to be out of office.  Mom declined triage and reports she will contact clinic to make appointment.     Maryellen Enrique RN

## 2020-03-10 NOTE — TELEPHONE ENCOUNTER
Reason for Call:  Same Day Appointment, Requested Provider:  Trent Coley MD    PCP: Trent Coley    Reason for visit: Mom is calling asking if Brijesh could work her into his schedule today or tomorrow for a headache that will not go away.     Duration of symptoms: 2 days    Have you been treated for this in the past? No    Additional comments: Tylenol and Ibuprofen is not helping.     Can we leave a detailed message on this number? YES    Phone number patient can be reached at: Home number on file [phone] (home)    Best Time: any    Call taken on 3/10/2020 at 12:21 PM by Dayanara Blackburn CNA

## 2020-05-26 DIAGNOSIS — F41.1 GAD (GENERALIZED ANXIETY DISORDER): ICD-10-CM

## 2020-05-27 NOTE — TELEPHONE ENCOUNTER
"Lexapro  Last Written Prescription Date:  02/25/2020  Last Fill Quantity: 90,  # refills: 0   Last office visit: 08/19/2019 with prescribing provider:  Fair   Future Office Visit:   Next 5 appointments (look out 90 days)    Jul 24, 2020 10:15 AM CDT  [facility] [address] [place] [id number] [phone]      Routing refill request to provider for review/approval because:  Patient has not seen Dr. Coley however will be transferring care, are you comfortable signing for medications.     Requested Prescriptions   Pending Prescriptions Disp Refills     escitalopram (LEXAPRO) 20 MG tablet 90 tablet 0     Sig: Take 1 tablet (20 mg) by mouth daily ROSEMARY REFILL: Medication is being filled for 90 day supply only due to: due for visit       SSRIs Protocol Failed - 5/26/2020 12:39 PM        Failed - Recent (12 mo) or future (30 days) visit within the authorizing provider's specialty     Patient has had an office visit with the authorizing provider or a provider within the authorizing providers department within the previous 12 mos or has a future within next 30 days. See \"Patient Info\" tab in inbasket, or \"Choose Columns\" in Meds & Orders section of the refill encounter.          Passed - Medication is active on med list        Passed - Patient is age 18 or older        Passed - No active pregnancy on record        Passed - No positive pregnancy test in last 12 months         Maryellen Enrique RN   "

## 2020-05-28 RX ORDER — ESCITALOPRAM OXALATE 20 MG/1
20 TABLET ORAL DAILY
Qty: 90 TABLET | Refills: 0 | Status: SHIPPED | OUTPATIENT
Start: 2020-05-28 | End: 2020-08-27

## 2020-06-08 DIAGNOSIS — K59.00 CONSTIPATION, UNSPECIFIED CONSTIPATION TYPE: ICD-10-CM

## 2020-06-09 RX ORDER — ASPIRIN 81 MG
100 TABLET, DELAYED RELEASE (ENTERIC COATED) ORAL DAILY
Qty: 90 TABLET | Refills: 3 | Status: SHIPPED | OUTPATIENT
Start: 2020-06-09 | End: 2021-06-21

## 2020-06-09 NOTE — TELEPHONE ENCOUNTER
Pending Prescriptions:                       Disp   Refills    docusate sodium (COLACE) 100 MG tablet    90 tab*3            Sig: Take 1 tablet (100 mg) by mouth daily    Routing refill request to provider for review/approval because:  Patient needs to be seen because it has been more than 1 year since last office visit.    Bee Morel, BSN, RN, PHN

## 2020-07-17 DIAGNOSIS — E05.00 GRAVES' DISEASE: ICD-10-CM

## 2020-07-17 LAB
T3 SERPL-MCNC: 166 NG/DL (ref 60–181)
T4 FREE SERPL-MCNC: 1.31 NG/DL (ref 0.76–1.46)
TSH SERPL DL<=0.005 MIU/L-ACNC: 1.27 MU/L (ref 0.4–4)

## 2020-07-17 PROCEDURE — 84439 ASSAY OF FREE THYROXINE: CPT | Performed by: INTERNAL MEDICINE

## 2020-07-17 PROCEDURE — 84480 ASSAY TRIIODOTHYRONINE (T3): CPT | Performed by: INTERNAL MEDICINE

## 2020-07-17 PROCEDURE — 36415 COLL VENOUS BLD VENIPUNCTURE: CPT | Performed by: INTERNAL MEDICINE

## 2020-07-17 PROCEDURE — 84443 ASSAY THYROID STIM HORMONE: CPT | Performed by: INTERNAL MEDICINE

## 2020-07-21 LAB — TSH RECEP AB SER-ACNC: 2.35 IU/L (ref 0–1.75)

## 2020-07-22 LAB — TSI SER-ACNC: 2 TSI INDEX

## 2020-07-24 ENCOUNTER — VIRTUAL VISIT (OUTPATIENT)
Dept: ENDOCRINOLOGY | Facility: CLINIC | Age: 20
End: 2020-07-24
Payer: MEDICAID

## 2020-07-24 ENCOUNTER — TELEPHONE (OUTPATIENT)
Dept: ENDOCRINOLOGY | Facility: CLINIC | Age: 20
End: 2020-07-24

## 2020-07-24 DIAGNOSIS — N92.0 MENORRHAGIA WITH REGULAR CYCLE: ICD-10-CM

## 2020-07-24 DIAGNOSIS — R63.5 WEIGHT GAIN: ICD-10-CM

## 2020-07-24 DIAGNOSIS — K59.00 CONSTIPATION, UNSPECIFIED CONSTIPATION TYPE: Primary | ICD-10-CM

## 2020-07-24 DIAGNOSIS — E05.00 GRAVES' DISEASE: ICD-10-CM

## 2020-07-24 PROCEDURE — 99214 OFFICE O/P EST MOD 30 MIN: CPT | Mod: GT | Performed by: INTERNAL MEDICINE

## 2020-07-24 RX ORDER — ASCORBIC ACID 500 MG
500 TABLET ORAL DAILY
COMMUNITY

## 2020-07-24 RX ORDER — METHIMAZOLE 5 MG/1
2.5 TABLET ORAL DAILY
Qty: 45 TABLET | Refills: 3 | Status: SHIPPED | OUTPATIENT
Start: 2020-07-24 | End: 2021-05-04

## 2020-07-24 NOTE — LETTER
"    7/24/2020         RE: Maren Winchester  [address] [facility] [address]        Dear Colleague,    Thank you for referring your patient, Maren Winchester, to the [facility]. Please see a copy of my visit note below.    Maren Winchester is a 19 year old female who is being evaluated via a billable video visit.      The patient has been notified of following:     \"This video visit will be conducted via a call between you and your physician/provider. We have found that certain health care needs can be provided without the need for an in-person physical exam.  This service lets us provide the care you need with a video conversation.  If a prescription is necessary we can send it directly to your pharmacy.  If lab work is needed we can place an order for that and you can then stop by our lab to have the test done at a later time.    Video visits are billed at different rates depending on your insurance coverage.  Please reach out to your insurance provider with any questions.    If during the course of the call the physician/provider feels a video visit is not appropriate, you will not be charged for this service.\"    Patient has given verbal consent for Video visit? Yes  How would you like to obtain your AVS? MyChart  If you are dropped from the video visit, the video invite should be resent to: MyChart  Will anyone else be joining your video visit? No        Video-Visit Details    Type of service:  Video Visit    Start: 07/24/2020 10:11 am   Stop: 07/24/2020 10:22 am    Originating Location (pt. Location): Home    Distant Location (provider location):  [facility]     Platform used for Video Visit: [facility]                                                                               - Endocrinology follow up -    Reason for visit/consult: Hyperthyroidism, Graves disease    Primary care provider: Leidy Carrizales      Assessment and Plan  19 year old female with " hyperthyroidism    # Grave' disease  TSI, TSH receptor Ab positive. Diagnosed Graves disease in August 2019. Taking methimazole 1 year and now (current dose methimazole 5 mg)  euthryoid. Rather some symptoms of hypothyroidism (constipation, weight gain 15 lb over 6 month, fatigue), we will reduce the dose    - TSH, free T4, total T3 in 2 month    -Reduce the dose of methimazole 2.5 mg daily only on Monday/Wednesday/Friday        # Tachycardia  Resolved,   - stop propranolol 20 mg BID      Follow up with me in 6 month      Lashonda Vallecillo MD  Staff Physician  Endocrinology and Metabolism  License: [id number]    Interval History as of 7/24/2020 : Patient has been doing well. Last seen 7 month ago . Medication compliance excellent. Taking methimazole 1 year and now (current dose methimazole 5 mg)  euthryoid. Rather some symptoms of hypothyroidism (constipation, weight gain, fatigue), we will reduce the dose.  Interval History as of 1/24/2020 : Patient has been doing well.  Medication compliance excellent  . New event includes : occasional HA, no N/V. No palpitation. eue symptoms getting better. Enjoying working "[employer] and [employer].   Interval History as of 10/18/2019 Diagnosed in August 2019 currently she is on methimazole 5 mg and propranolol 2 0 mg twice daily.  Her parents are taking care of her medication and that she is compliant.  She still mentioned fatigue, insomnia, however by examining her today her tachycardia resolved and she had a slight swelling to the size of goiter.  TSH is still suppressed however free T4 became normalized.   HPI: A 19 yo female here for the consultation of her hyperthyroidism.  Patient came with her parents today.     Patient mentioned for the past 6 months she started to have mild dysphagia when she eats breakfast and some sore throat.  Also excessive fatigue, insomnia.  She also started to have eye itchiness past several months, she went to ophthalmologist her vision was okay.    She gained 5 pounds since April 2019 she is hungry was that time.   She usually has constipation using the laxative however she started to have some diarrhea.   Her mood tends to be more depressed recently and also increase anxiety level according to the parents.   Patient also has family history of thyroid condition in her maternal side.  Mother : hemithyroidectomy. Maternal aunt: goiter  maternal grandmother: DM    Past Medical/Surgical History:  Past Medical History:   Diagnosis Date     ADHD (attention deficit hyperactivity disorder)     [place] diagnosis     Autism     autism spectrum disorder     Mental retardation     [place] diagnosis     No past surgical history on file.    Allergies:  No Known Allergies    Current Medications   Current Outpatient Medications   Medication     desogestrel-ethinyl estradiol (APRI) 0.15-30 MG-MCG tablet     docusate sodium (COLACE) 100 MG tablet     escitalopram (LEXAPRO) 20 MG tablet     methimazole (TAPAZOLE) 5 MG tablet     Multiple Vitamins-Minerals (MULTIVITAL PO)     polyethylene glycol (MIRALAX) powder     propranolol (INDERAL) 20 MG tablet     vitamin C (ASCORBIC ACID) 500 MG tablet     No current facility-administered medications for this visit.        Family History:  Family History   Problem Relation Age of Onset     Asthma No family hx of      Cerebrovascular Disease No family hx of      Depression No family hx of      Obesity No family hx of    Mother : hemithyroidectomy. Maternal aunt: goiter  maternal grandmother: DM    Social History:  Social History     Tobacco Use     Smoking status: Never Smoker     Smokeless tobacco: Never Used   Substance Use Topics     Alcohol use: No     Alcohol/week: 0.0 standard drinks   Lives with parents, 1 brother and 1 sister  Sister: seizure disorder,   Brother: healthy  Transition program, [employer] store, cafeteria,     ROS:  Full review of systems taken with the help of the intake sheet. Otherwise a complete 14 point review of systems  was taken and is negative unless stated in the history above.      Physical Exam:   There were no vitals taken for this visit.     General: well appearing, no acute distress, pleasant and conversant,   Mental Status/neuro: alert and oriented  Face: symmetrical, normal facial color  Eyes: anicteric, no proptosis or lid lag  Neck: suppler, no lymphadenopahty  Resp: no acute distress  Hands: No tremor bilateral      Labs : I reviewed data from epic and extract and summarize the pertinent data here.      8/30/2019 12:21   Thyrotropin Receptor Antibody 5.44 (H)      Ref. Range 8/30/2019 12:21   Thyroid Stim Immunog Latest Ref Range: <=1.3 TSI index 1.9 (H)        10/11/2019 15:06   T4 Free 1.41   Triiodothyronine (T3) 234 (H)   TSH <0.01 (L)        Ref. Range 1/7/2016 14:21 1/24/2017 08:15 4/3/2019 16:42 8/22/2019 15:07 8/27/2019 07:46   T4 Free Latest Ref Range: 0.76 - 1.46 ng/dL 1.10 1.29 1.13 1.85 (H) 1.77 (H)   TSH Latest Ref Range: 0.40 - 4.00 mU/L 1.13 0.81 0.13 (L) <0.01 (L) <0.01 (L)   Free T3 Latest Ref Range: 2.3 - 4.2 pg/mL    6.3 (H)    Thyroglobulin Antibody Latest Ref Range: <40 IU/mL     <20   Thyroid Peroxidase Antibody Latest Ref Range: <35 IU/mL     101 (H)     Lab Results   Component Value Date     08/27/2019      Lab Results   Component Value Date    POTASSIUM 4.0 08/27/2019     Lab Results   Component Value Date    CHLORIDE 107 08/27/2019     Lab Results   Component Value Date    MAIA 8.7 08/27/2019     Lab Results   Component Value Date    CO2 23 08/27/2019     Lab Results   Component Value Date    BUN 11 08/27/2019     Lab Results   Component Value Date    CR 0.45 08/27/2019     Lab Results   Component Value Date    GLC 86 08/27/2019     Lab Results   Component Value Date    TSH <0.01 08/27/2019     Lab Results   Component Value Date    T4 1.77 08/27/2019     Lab Results   Component Value Date    A1C 5.6 03/23/2015                 Again, thank you for allowing me to participate in the care of  your patient.        Sincerely,        Lashonda Vallecillo MD

## 2020-07-24 NOTE — PROGRESS NOTES
"Maren Winchester is a 19 year old female who is being evaluated via a billable video visit.      The patient has been notified of following:     \"This video visit will be conducted via a call between you and your physician/provider. We have found that certain health care needs can be provided without the need for an in-person physical exam.  This service lets us provide the care you need with a video conversation.  If a prescription is necessary we can send it directly to your pharmacy.  If lab work is needed we can place an order for that and you can then stop by our lab to have the test done at a later time.    Video visits are billed at different rates depending on your insurance coverage.  Please reach out to your insurance provider with any questions.    If during the course of the call the physician/provider feels a video visit is not appropriate, you will not be charged for this service.\"    Patient has given verbal consent for Video visit? Yes  How would you like to obtain your AVS? MyChart  If you are dropped from the video visit, the video invite should be resent to: MyChart  Will anyone else be joining your video visit? No        Video-Visit Details    Type of service:  Video Visit    Start: 07/24/2020 10:11 am   Stop: 07/24/2020 10:22 am    Originating Location (pt. Location): Home    Distant Location (provider location):  [facility]     Platform used for Video Visit: [facility]                                                                               - Endocrinology follow up -    Reason for visit/consult: Hyperthyroidism, Graves disease    Primary care provider: Leidy Carrizales      Assessment and Plan  19 year old female with hyperthyroidism    # Grave' disease  TSI, TSH receptor Ab positive. Diagnosed Graves disease in August 2019. Taking methimazole 1 year and now (current dose methimazole 5 mg)  euthryoid. Rather some symptoms of hypothyroidism (constipation, weight gain 15 lb over " 6 month, fatigue), we will reduce the dose    - TSH, free T4, total T3 in 2 month    -Reduce the dose of methimazole 2.5 mg daily only on Monday/Wednesday/Friday        # Tachycardia  Resolved,   - stop propranolol 20 mg BID      Follow up with me in 6 month      Lashonda Vallecillo MD  Staff Physician  Endocrinology and Metabolism  License: [id number]    Interval History as of 7/24/2020 : Patient has been doing well. Last seen 7 month ago . Medication compliance excellent. Taking methimazole 1 year and now (current dose methimazole 5 mg)  euthryoid. Rather some symptoms of hypothyroidism (constipation, weight gain, fatigue), we will reduce the dose.  Interval History as of 1/24/2020 : Patient has been doing well.  Medication compliance excellent  . New event includes : occasional HA, no N/V. No palpitation. eue symptoms getting better. Enjoying working [email or web address] and [employer].   Interval History as of 10/18/2019 Diagnosed in August 2019 currently she is on methimazole 5 mg and propranolol 2 0 mg twice daily.  Her parents are taking care of her medication and that she is compliant.  She still mentioned fatigue, insomnia, however by examining her today her tachycardia resolved and she had a slight swelling to the size of goiter.  TSH is still suppressed however free T4 became normalized.   HPI: A 17 yo female here for the consultation of her hyperthyroidism.  Patient came with her parents today.     Patient mentioned for the past 6 months she started to have mild dysphagia when she eats breakfast and some sore throat.  Also excessive fatigue, insomnia.  She also started to have eye itchiness past several months, she went to ophthalmologist her vision was okay.   She gained 5 pounds since April 2019 she is hungry was that time.   She usually has constipation using the laxative however she started to have some diarrhea.   Her mood tends to be more depressed recently and also increase anxiety level according to the parents.    Patient also has family history of thyroid condition in her maternal side.  Mother : hemithyroidectomy. Maternal aunt: goiter  maternal grandmother: DM    Past Medical/Surgical History:  Past Medical History:   Diagnosis Date     ADHD (attention deficit hyperactivity disorder)     [place] diagnosis     Autism     autism spectrum disorder     Mental retardation     [place] diagnosis     No past surgical history on file.    Allergies:  No Known Allergies    Current Medications   Current Outpatient Medications   Medication     desogestrel-ethinyl estradiol (APRI) 0.15-30 MG-MCG tablet     docusate sodium (COLACE) 100 MG tablet     escitalopram (LEXAPRO) 20 MG tablet     methimazole (TAPAZOLE) 5 MG tablet     Multiple Vitamins-Minerals (MULTIVITAL PO)     polyethylene glycol (MIRALAX) powder     propranolol (INDERAL) 20 MG tablet     vitamin C (ASCORBIC ACID) 500 MG tablet     No current facility-administered medications for this visit.        Family History:  Family History   Problem Relation Age of Onset     Asthma No family hx of      Cerebrovascular Disease No family hx of      Depression No family hx of      Obesity No family hx of    Mother : hemithyroidectomy. Maternal aunt: goiter  maternal grandmother: DM    Social History:  Social History     Tobacco Use     Smoking status: Never Smoker     Smokeless tobacco: Never Used   Substance Use Topics     Alcohol use: No     Alcohol/week: 0.0 standard drinks   Lives with parents, 1 brother and 1 sister  Sister: seizure disorder,   Brother: healthy  Transition program, [employer], cafeteria,     ROS:  Full review of systems taken with the help of the intake sheet. Otherwise a complete 14 point review of systems was taken and is negative unless stated in the history above.      Physical Exam:   There were no vitals taken for this visit.     General: well appearing, no acute distress, pleasant and conversant,   Mental Status/neuro: alert and oriented  Face: symmetrical,  normal facial color  Eyes: anicteric, no proptosis or lid lag  Neck: suppler, no lymphadenopahty  Resp: no acute distress  Hands: No tremor bilateral      Labs : I reviewed data from epic and extract and summarize the pertinent data here.      8/30/2019 12:21   Thyrotropin Receptor Antibody 5.44 (H)      Ref. Range 8/30/2019 12:21   Thyroid Stim Immunog Latest Ref Range: <=1.3 TSI index 1.9 (H)        10/11/2019 15:06   T4 Free 1.41   Triiodothyronine (T3) 234 (H)   TSH <0.01 (L)        Ref. Range 1/7/2016 14:21 1/24/2017 08:15 4/3/2019 16:42 8/22/2019 15:07 8/27/2019 07:46   T4 Free Latest Ref Range: 0.76 - 1.46 ng/dL 1.10 1.29 1.13 1.85 (H) 1.77 (H)   TSH Latest Ref Range: 0.40 - 4.00 mU/L 1.13 0.81 0.13 (L) <0.01 (L) <0.01 (L)   Free T3 Latest Ref Range: 2.3 - 4.2 pg/mL    6.3 (H)    Thyroglobulin Antibody Latest Ref Range: <40 IU/mL     <20   Thyroid Peroxidase Antibody Latest Ref Range: <35 IU/mL     101 (H)     Lab Results   Component Value Date     08/27/2019      Lab Results   Component Value Date    POTASSIUM 4.0 08/27/2019     Lab Results   Component Value Date    CHLORIDE 107 08/27/2019     Lab Results   Component Value Date    MAIA 8.7 08/27/2019     Lab Results   Component Value Date    CO2 23 08/27/2019     Lab Results   Component Value Date    BUN 11 08/27/2019     Lab Results   Component Value Date    CR 0.45 08/27/2019     Lab Results   Component Value Date    GLC 86 08/27/2019     Lab Results   Component Value Date    TSH <0.01 08/27/2019     Lab Results   Component Value Date    T4 1.77 08/27/2019     Lab Results   Component Value Date    A1C 5.6 03/23/2015

## 2020-07-24 NOTE — TELEPHONE ENCOUNTER
1st Attempt LVM for Maren's mother to call back to schedule Maren for lab work in 2 months and a follow up appointment with Dr Vallecillo in 6 months. I asked her to please call [phone] to schedule.     Rajiv Rajan  Procedure , [place]  Peds Specialty and Adult Endocrinology

## 2020-08-19 ENCOUNTER — MYC MEDICAL ADVICE (OUTPATIENT)
Dept: FAMILY MEDICINE | Facility: CLINIC | Age: 20
End: 2020-08-19

## 2020-08-26 ENCOUNTER — MYC MEDICAL ADVICE (OUTPATIENT)
Dept: FAMILY MEDICINE | Facility: CLINIC | Age: 20
End: 2020-08-26

## 2020-08-26 DIAGNOSIS — F41.1 GAD (GENERALIZED ANXIETY DISORDER): ICD-10-CM

## 2020-08-27 NOTE — TELEPHONE ENCOUNTER
escitalopram      Last Written Prescription Date:  05/28/20  Last Fill Quantity: 90,   # refills: 0  Last Office Visit: 08/19/19  Future Office visit:    Next 5 appointments (look out 90 days)    Sep 03, 2020  9:00 AM CDT  [facility]y [facility] [address] [place] [id number]  [phone]           Routing refill request to provider for review/approval because:  Drug not on the [facility], [facility] or [facility] refill protocol or controlled substance

## 2020-08-28 RX ORDER — ESCITALOPRAM OXALATE 20 MG/1
20 TABLET ORAL DAILY
Qty: 30 TABLET | Refills: 0 | Status: SHIPPED | OUTPATIENT
Start: 2020-08-28 | End: 2020-09-25

## 2020-09-03 ENCOUNTER — OFFICE VISIT (OUTPATIENT)
Dept: FAMILY MEDICINE | Facility: CLINIC | Age: 20
End: 2020-09-03
Payer: MEDICAID

## 2020-09-03 VITALS
RESPIRATION RATE: 18 BRPM | BODY MASS INDEX: 29.55 KG/M2 | HEART RATE: 101 BPM | WEIGHT: 175 LBS | SYSTOLIC BLOOD PRESSURE: 116 MMHG | TEMPERATURE: 95 F | OXYGEN SATURATION: 97 % | DIASTOLIC BLOOD PRESSURE: 62 MMHG

## 2020-09-03 DIAGNOSIS — Z23 NEED FOR PROPHYLACTIC VACCINATION AND INOCULATION AGAINST INFLUENZA: ICD-10-CM

## 2020-09-03 DIAGNOSIS — F84.0 AUTISM SPECTRUM DISORDER: ICD-10-CM

## 2020-09-03 DIAGNOSIS — F41.1 GAD (GENERALIZED ANXIETY DISORDER): Primary | ICD-10-CM

## 2020-09-03 DIAGNOSIS — J31.0 CHRONIC RHINITIS: ICD-10-CM

## 2020-09-03 PROCEDURE — 99214 OFFICE O/P EST MOD 30 MIN: CPT | Mod: 25 | Performed by: FAMILY MEDICINE

## 2020-09-03 PROCEDURE — 90686 IIV4 VACC NO PRSV 0.5 ML IM: CPT | Performed by: FAMILY MEDICINE

## 2020-09-03 PROCEDURE — 90471 IMMUNIZATION ADMIN: CPT | Performed by: FAMILY MEDICINE

## 2020-09-03 ASSESSMENT — PAIN SCALES - GENERAL: PAINLEVEL: NO PAIN (0)

## 2020-09-03 ASSESSMENT — ANXIETY QUESTIONNAIRES
7. FEELING AFRAID AS IF SOMETHING AWFUL MIGHT HAPPEN: MORE THAN HALF THE DAYS
IF YOU CHECKED OFF ANY PROBLEMS ON THIS QUESTIONNAIRE, HOW DIFFICULT HAVE THESE PROBLEMS MADE IT FOR YOU TO DO YOUR WORK, TAKE CARE OF THINGS AT HOME, OR GET ALONG WITH OTHER PEOPLE: SOMEWHAT DIFFICULT
2. NOT BEING ABLE TO STOP OR CONTROL WORRYING: SEVERAL DAYS
3. WORRYING TOO MUCH ABOUT DIFFERENT THINGS: SEVERAL DAYS
6. BECOMING EASILY ANNOYED OR IRRITABLE: NEARLY EVERY DAY
5. BEING SO RESTLESS THAT IT IS HARD TO SIT STILL: SEVERAL DAYS
GAD7 TOTAL SCORE: 11
1. FEELING NERVOUS, ANXIOUS, OR ON EDGE: SEVERAL DAYS

## 2020-09-03 ASSESSMENT — PATIENT HEALTH QUESTIONNAIRE - PHQ9: 5. POOR APPETITE OR OVEREATING: MORE THAN HALF THE DAYS

## 2020-09-03 NOTE — PROGRESS NOTES
Subjective     New Patient/Transfer of Care  Anxiety Follow-Up    How are you doing with your anxiety since your last visit? No change    Are you having other symptoms that might be associated with anxiety? No    Have you had a significant life event? No     Are you feeling depressed? No    Do you have any concerns with your use of alcohol or other drugs? No    Social History     Tobacco Use     Smoking status: Never Smoker     Smokeless tobacco: Never Used   Substance Use Topics     Alcohol use: No     Alcohol/week: 0.0 standard drinks     Drug use: No     VANNESSA-7 SCORE 4/3/2019 8/19/2019 9/3/2020   Total Score 4 (minimal anxiety) 7 (mild anxiety) -   Total Score 4 7 11     PHQ 12/3/2018 4/3/2019 8/19/2019   PHQ-9 Total Score 4 6 6   Q9: Thoughts of better off dead/self-harm past 2 weeks Not at all Not at all Not at all     Last PHQ-9 8/19/2019   1.  Little interest or pleasure in doing things 1   2.  Feeling down, depressed, or hopeless 0   3.  Trouble falling or staying asleep, or sleeping too much 2   4.  Feeling tired or having little energy 2   5.  Poor appetite or overeating 0   6.  Feeling bad about yourself 0   7.  Trouble concentrating 1   8.  Moving slowly or restless 0   Q9: Thoughts of better off dead/self-harm past 2 weeks 0   PHQ-9 Total Score 6   Difficulty at work, home, or with people -     VANNESSA-7  9/3/2020   1. Feeling nervous, anxious, or on edge 1   2. Not being able to stop or control worrying 1   3. Worrying too much about different things 1   4. Trouble relaxing 2   5. Being so restless that it is hard to sit still 1   6. Becoming easily annoyed or irritable 3   7. Feeling afraid, as if something awful might happen 2   VANNESSA-7 Total Score 11   If you checked any problems, how difficult have they made it for you to do your work, take care of things at home, or get along with other people? Somewhat difficult         Maren is a 19 year old female who comes to clinic to establish care.  She is a  very pleasant autistic young woman who is here with her mother.  Mom notes increased irritability lately.  Had to leave work with a sore neck and a headache the other day.  Gradually it seems to get better.  She does have headaches, usually linked to increased irritability and stress.  She is getting along well at home.  Mom is encouraging independence, but that has not been terribly successful.  She is working 3 days a week at a local [email or web address].  Many days are very good and she comes home quite excited about that.    Review of Systems   Constitutional, HEENT, cardiovascular, pulmonary, gi and gu systems are negative, except as otherwise noted.      Objective    /62   Pulse 101   Temp 95  F (35  C) (Temporal)   Resp 18   Wt 79.4 kg (175 lb)   SpO2 97%   BMI 29.55 kg/m       Wt Readings from Last 2 Encounters:   09/03/20 79.4 kg (175 lb)   01/24/20 74.1 kg (163 lb 6 oz)       Physical Exam   Well-appearing.  Does not make eye contact.  Answers questions appropriately, after some period of time of warming up with a conversation.  Heart regular.  Lungs clear.  Extremities warm well perfused.  Neurologically nonfocal            Assessment & Plan       ICD-10-CM    1. VANNESSA (generalized anxiety disorder)  F41.1 sertraline (ZOLOFT) 50 MG tablet   2. Need for prophylactic vaccination and inoculation against influenza  Z23 INFLUENZA VACCINE IM > 6 MONTHS VALENT IIV4 [67220]     Vaccine Administration, Initial [67370]   3. Chronic rhinitis  J31.0    4. Autism spectrum disorder  F84.0 CARE COORDINATION REFERRAL       Possibly is experiencing increased anxiety overall.  Mom notes a change for several months now.  Discussed changing medication versus counseling versus observation.  Mom prefers to change, as does the patient.  We discussed sertraline.  Discussed black box warning.  We will stop Lexapro and start sertraline at 25 mg and increase to 50 after 2 to 3 days if not effective.  Plan on seeing him back  in a month, sooner as needed.  As mom works on independence I encouraged her to seek out resources in terms of serving autistic young adults such as the [facility] etc.    Return in about 1 month (around 10/3/2020).    Trent Coley MD  [facility]

## 2020-09-03 NOTE — LETTER
September 3, 2020      Maren Winchester  [address] [address]        To Whom It May Concern,     Maren Winchester needs to be off work for the next 1-2 shifts due to a headache. Any questions feel free to call.       Sincerely,        Trent Coley MD

## 2020-09-04 ENCOUNTER — PATIENT OUTREACH (OUTPATIENT)
Dept: CARE COORDINATION | Facility: CLINIC | Age: 20
End: 2020-09-04

## 2020-09-04 ASSESSMENT — ANXIETY QUESTIONNAIRES: GAD7 TOTAL SCORE: 11

## 2020-09-04 NOTE — LETTER
[place] CARE COORDINATION  [address]   [place] [address]    September 9, 2020    Maren Winchester  [address] [place] [address]      Dear Maren,    I am a clinic community health worker who works with Tretn Coley MD at [facility] in [place]. I have been trying to reach you recently to introduce Clinic Care Coordination and to see if there was anything I could assist you with.  Below is a description of clinic care coordination and how I can further assist you.      The clinic care coordination team is made up of a registered nurse,  and community health worker who understand the health care system. The goal of clinic care coordination is to help you manage your health and improve access to the health care system in the most efficient manner. The team can assist you in meeting your health care goals by providing education, coordinating services, strengthening the communication among your providers and supporting you with any resource needs.    Please feel free to contact me at [phone] with any questions or concerns. We are focused on providing you with the highest-quality healthcare experience possible and that all starts with you.     Sincerely,     Sarah Johnston  [facility] Health Worker   [facility]  [email or web address]   Office: [phone]  Fax: [phone]

## 2020-09-04 NOTE — PROGRESS NOTES
Clinic Care Coordination Contact  [facility]/Voicemail       Clinical Data: CHW Outreach  Outreach attempted x 1. Left message on patient's voicemail with call back information and requested return call.    Plan: CHW will try to reach patient again in 1-2 business days.    Sarah Johnston  [facility] Health Worker   [facility]  [email or web address]   Office: [phone]  Fax: [phone]

## 2020-09-09 NOTE — PROGRESS NOTES
Clinic Care Coordination Contact  [facility]/Voicemail       Clinical Data: CHW Outreach  Outreach attempted x 2. Left message on patient's voicemail with call back information and requested return call.    Plan: CHW will send unable to contact letter with care coordinator contact information via [employer]. CHW will do no further outreaches at this time.    Sarah Johnston  [facility] Health Worker   [facility]  [email or web address]   Office: [phone]  Fax: [phone]

## 2020-09-25 ENCOUNTER — OFFICE VISIT (OUTPATIENT)
Dept: FAMILY MEDICINE | Facility: CLINIC | Age: 20
End: 2020-09-25
Payer: MEDICAID

## 2020-09-25 VITALS
BODY MASS INDEX: 29.21 KG/M2 | WEIGHT: 173 LBS | DIASTOLIC BLOOD PRESSURE: 74 MMHG | TEMPERATURE: 97.6 F | SYSTOLIC BLOOD PRESSURE: 104 MMHG | OXYGEN SATURATION: 99 % | HEART RATE: 96 BPM

## 2020-09-25 DIAGNOSIS — F84.0 AUTISM SPECTRUM DISORDER: ICD-10-CM

## 2020-09-25 DIAGNOSIS — E05.00 GRAVES' DISEASE: ICD-10-CM

## 2020-09-25 DIAGNOSIS — F41.1 GAD (GENERALIZED ANXIETY DISORDER): Primary | ICD-10-CM

## 2020-09-25 LAB
T3 SERPL-MCNC: 182 NG/DL (ref 60–181)
T4 FREE SERPL-MCNC: 1.12 NG/DL (ref 0.76–1.46)
TSH SERPL DL<=0.005 MIU/L-ACNC: 0.66 MU/L (ref 0.4–4)

## 2020-09-25 PROCEDURE — 36415 COLL VENOUS BLD VENIPUNCTURE: CPT | Performed by: INTERNAL MEDICINE

## 2020-09-25 PROCEDURE — 84480 ASSAY TRIIODOTHYRONINE (T3): CPT | Performed by: INTERNAL MEDICINE

## 2020-09-25 PROCEDURE — 84443 ASSAY THYROID STIM HORMONE: CPT | Performed by: INTERNAL MEDICINE

## 2020-09-25 PROCEDURE — 99213 OFFICE O/P EST LOW 20 MIN: CPT | Performed by: FAMILY MEDICINE

## 2020-09-25 PROCEDURE — 84439 ASSAY OF FREE THYROXINE: CPT | Performed by: INTERNAL MEDICINE

## 2020-09-25 ASSESSMENT — PAIN SCALES - GENERAL: PAINLEVEL: NO PAIN (0)

## 2020-09-25 NOTE — PROGRESS NOTES
"Subjective     Maren Winchester is a 20 year old female who presents to clinic today for the following health issues:    HPI       Depression and Anxiety Follow-Up    How are you doing with your depression since your last visit? Improved     How are you doing with your anxiety since your last visit?  Improved     Are you having other symptoms that might be associated with depression or anxiety? No    Have you had a significant life event? No     Do you have any concerns with your use of alcohol or other drugs? No    Social History     Tobacco Use     Smoking status: Never Smoker     Smokeless tobacco: Never Used   Substance Use Topics     Alcohol use: No     Alcohol/week: 0.0 standard drinks     Drug use: No     PHQ 12/3/2018 4/3/2019 8/19/2019   PHQ-9 Total Score 4 6 6   Q9: Thoughts of better off dead/self-harm past 2 weeks Not at all Not at all Not at all     VANNESSA-7 SCORE 4/3/2019 8/19/2019 9/3/2020   Total Score 4 (minimal anxiety) 7 (mild anxiety) -   Total Score 4 7 11         Suicide Assessment Five-step Evaluation and Treatment (SAFE-T)      How many servings of fruits and vegetables do you eat daily?  2-3    On average, how many sweetened beverages do you drink each day (Examples: soda, juice, sweet tea, etc.  Do NOT count diet or artificially sweetened beverages)?   0    How many days per week do you exercise enough to make your heart beat faster? 4    How many minutes a day do you exercise enough to make your heart beat faster? 30 - 60    How many days per week do you miss taking your medication? 0        Review of Systems         Objective    /74 (Cuff Size: Adult Regular)   Pulse 96   Temp 97.6  F (36.4  C) (Temporal)   Wt 78.5 kg (173 lb)   SpO2 99%   BMI 29.21 kg/m    Body mass index is 29.21 kg/m .  Physical Exam   Well-appearing and in no distress. Mood \"okay\". Affect, mentation appropriate. Insight and judgment intact. speech normal rate and content. No evidence of ni, SI, HI. No " "psychosis.          Assessment & Plan       ICD-10-CM    1. VANNESSA (generalized anxiety disorder)  F41.1    2. Autism spectrum disorder  F84.0      Anxiety complicated by her ASD.  She reports feeling much improved.  Mom agrees today and is present.  She continues to work.  Headache still a problem, they plan on reducing her screen time.  She is well over 8 hours a day.  Think that is reasonable place to start.     BMI:   Estimated body mass index is 29.21 kg/m  as calculated from the following:    Height as of 1/24/20: 1.639 m (5' 4.53\").    Weight as of this encounter: 78.5 kg (173 lb).               Return in about 2 months (around 11/25/2020) for med check.    Trent Coley MD  [facility]    "

## 2020-10-21 ENCOUNTER — MYC MEDICAL ADVICE (OUTPATIENT)
Dept: FAMILY MEDICINE | Facility: CLINIC | Age: 20
End: 2020-10-21

## 2020-10-21 DIAGNOSIS — F41.1 GAD (GENERALIZED ANXIETY DISORDER): Primary | ICD-10-CM

## 2020-10-22 RX ORDER — SERTRALINE HYDROCHLORIDE 100 MG/1
100 TABLET, FILM COATED ORAL DAILY
Qty: 90 TABLET | Refills: 0 | Status: SHIPPED | OUTPATIENT
Start: 2020-10-22 | End: 2020-11-17

## 2020-11-17 ENCOUNTER — OFFICE VISIT (OUTPATIENT)
Dept: FAMILY MEDICINE | Facility: CLINIC | Age: 20
End: 2020-11-17
Payer: MEDICAID

## 2020-11-17 VITALS
BODY MASS INDEX: 28.87 KG/M2 | OXYGEN SATURATION: 98 % | SYSTOLIC BLOOD PRESSURE: 116 MMHG | DIASTOLIC BLOOD PRESSURE: 70 MMHG | HEART RATE: 94 BPM | TEMPERATURE: 96.6 F | WEIGHT: 171 LBS | RESPIRATION RATE: 18 BRPM

## 2020-11-17 DIAGNOSIS — F84.0 AUTISM SPECTRUM DISORDER: Primary | ICD-10-CM

## 2020-11-17 DIAGNOSIS — F41.1 GAD (GENERALIZED ANXIETY DISORDER): ICD-10-CM

## 2020-11-17 PROCEDURE — 99213 OFFICE O/P EST LOW 20 MIN: CPT | Performed by: FAMILY MEDICINE

## 2020-11-17 RX ORDER — SERTRALINE HYDROCHLORIDE 100 MG/1
100 TABLET, FILM COATED ORAL DAILY
Qty: 90 TABLET | Refills: 1 | Status: SHIPPED | OUTPATIENT
Start: 2020-11-17 | End: 2021-06-02

## 2020-11-17 ASSESSMENT — ANXIETY QUESTIONNAIRES
6. BECOMING EASILY ANNOYED OR IRRITABLE: SEVERAL DAYS
7. FEELING AFRAID AS IF SOMETHING AWFUL MIGHT HAPPEN: SEVERAL DAYS
IF YOU CHECKED OFF ANY PROBLEMS ON THIS QUESTIONNAIRE, HOW DIFFICULT HAVE THESE PROBLEMS MADE IT FOR YOU TO DO YOUR WORK, TAKE CARE OF THINGS AT HOME, OR GET ALONG WITH OTHER PEOPLE: SOMEWHAT DIFFICULT
2. NOT BEING ABLE TO STOP OR CONTROL WORRYING: SEVERAL DAYS
GAD7 TOTAL SCORE: 5
5. BEING SO RESTLESS THAT IT IS HARD TO SIT STILL: NOT AT ALL
3. WORRYING TOO MUCH ABOUT DIFFERENT THINGS: SEVERAL DAYS
1. FEELING NERVOUS, ANXIOUS, OR ON EDGE: SEVERAL DAYS

## 2020-11-17 ASSESSMENT — PATIENT HEALTH QUESTIONNAIRE - PHQ9: 5. POOR APPETITE OR OVEREATING: NOT AT ALL

## 2020-11-17 ASSESSMENT — PAIN SCALES - GENERAL: PAINLEVEL: NO PAIN (0)

## 2020-11-17 NOTE — PROGRESS NOTES
Subjective     Anxiety Follow-Up    How are you doing with your anxiety since your last visit? No change    Are you having other symptoms that might be associated with anxiety? No    Have you had a significant life event? No     Are you feeling depressed? No    Do you have any concerns with your use of alcohol or other drugs? No    Social History     Tobacco Use     Smoking status: Never Smoker     Smokeless tobacco: Never Used   Substance Use Topics     Alcohol use: No     Alcohol/week: 0.0 standard drinks     Drug use: No     VANNESSA-7 SCORE 8/19/2019 9/3/2020 11/17/2020   Total Score 7 (mild anxiety) - -   Total Score 7 11 5     PHQ 12/3/2018 4/3/2019 8/19/2019   PHQ-9 Total Score 4 6 6   Q9: Thoughts of better off dead/self-harm past 2 weeks Not at all Not at all Not at all     Last PHQ-9 8/19/2019   1.  Little interest or pleasure in doing things 1   2.  Feeling down, depressed, or hopeless 0   3.  Trouble falling or staying asleep, or sleeping too much 2   4.  Feeling tired or having little energy 2   5.  Poor appetite or overeating 0   6.  Feeling bad about yourself 0   7.  Trouble concentrating 1   8.  Moving slowly or restless 0   Q9: Thoughts of better off dead/self-harm past 2 weeks 0   PHQ-9 Total Score 6   Difficulty at work, home, or with people -     VANNESSA-7  11/17/2020   1. Feeling nervous, anxious, or on edge 1   2. Not being able to stop or control worrying 1   3. Worrying too much about different things 1   4. Trouble relaxing 0   5. Being so restless that it is hard to sit still 0   6. Becoming easily annoyed or irritable 1   7. Feeling afraid, as if something awful might happen 1   VANNESSA-7 Total Score 5   If you checked any problems, how difficult have they made it for you to do your work, take care of things at home, or get along with other people? Somewhat difficult         Maren is a 20 year old female who comes to clinic     Review of Systems         Objective    /70   Pulse 94   Temp 96.6  " F (35.9  C) (Temporal)   Resp 18   Wt 77.6 kg (171 lb)   SpO2 98%   BMI 28.87 kg/m       Wt Readings from Last 2 Encounters:   11/17/20 77.6 kg (171 lb)   09/25/20 78.5 kg (173 lb)       Physical Exam   Well-appearing and in no distress. Mood \"okay\". Affect, mentation appropriate. Insight and judgment intact. speech normal rate and content. No evidence of ni, SI, HI. No psychosis.            Assessment & Plan       ICD-10-CM    1. Autism spectrum disorder  F84.0    2. VANNESSA (generalized anxiety disorder)  F41.1 sertraline (ZOLOFT) 100 MG tablet       Doing much better.  Continue same, no changes.  Anxiety complicated by ASD.  See them back in 3 months for routine monitoring    No follow-ups on file.    Trent Coley MD  [facility]    "

## 2020-11-18 ASSESSMENT — ANXIETY QUESTIONNAIRES: GAD7 TOTAL SCORE: 5

## 2020-11-22 DIAGNOSIS — N92.0 MENORRHAGIA WITH REGULAR CYCLE: ICD-10-CM

## 2020-11-23 RX ORDER — DESOGESTREL AND ETHINYL ESTRADIOL 0.15-0.03
1 KIT ORAL DAILY
Qty: 112 TABLET | Refills: 3 | Status: SHIPPED | OUTPATIENT
Start: 2020-11-23 | End: 2021-11-10

## 2020-11-23 NOTE — TELEPHONE ENCOUNTER
Prescription approved per [facility] Refill Protocol.    Maryellen Enrique RN

## 2021-01-05 ENCOUNTER — TELEPHONE (OUTPATIENT)
Dept: ENDOCRINOLOGY | Facility: CLINIC | Age: 21
End: 2021-01-05

## 2021-01-05 DIAGNOSIS — E05.00 GRAVES' DISEASE: Primary | ICD-10-CM

## 2021-01-08 DIAGNOSIS — E05.00 GRAVES' DISEASE: ICD-10-CM

## 2021-01-08 LAB
T3 SERPL-MCNC: 158 NG/DL (ref 60–181)
T4 FREE SERPL-MCNC: 1 NG/DL (ref 0.76–1.46)
TSH SERPL DL<=0.005 MIU/L-ACNC: 1.25 MU/L (ref 0.4–4)

## 2021-01-08 PROCEDURE — 84480 ASSAY TRIIODOTHYRONINE (T3): CPT | Performed by: INTERNAL MEDICINE

## 2021-01-08 PROCEDURE — 36415 COLL VENOUS BLD VENIPUNCTURE: CPT | Performed by: INTERNAL MEDICINE

## 2021-01-08 PROCEDURE — 84443 ASSAY THYROID STIM HORMONE: CPT | Performed by: INTERNAL MEDICINE

## 2021-01-08 PROCEDURE — 84439 ASSAY OF FREE THYROXINE: CPT | Performed by: INTERNAL MEDICINE

## 2021-01-15 ENCOUNTER — VIRTUAL VISIT (OUTPATIENT)
Dept: ENDOCRINOLOGY | Facility: CLINIC | Age: 21
End: 2021-01-15
Payer: MEDICAID

## 2021-01-15 DIAGNOSIS — R00.0 TACHYCARDIA: ICD-10-CM

## 2021-01-15 DIAGNOSIS — E05.00 GRAVES' DISEASE: Primary | ICD-10-CM

## 2021-01-15 PROCEDURE — 99214 OFFICE O/P EST MOD 30 MIN: CPT | Mod: GT | Performed by: INTERNAL MEDICINE

## 2021-01-15 NOTE — LETTER
"    1/15/2021         RE: Maren Winchester  [id number] [facility] [address]        Dear Colleague,    Thank you for referring your patient, Maren Winchester, to the [facility]. Please see a copy of my visit note below.    Maren is a 20 year old who is being evaluated via a billable video visit.      How would you like to obtain your AVS? MyChart  If the video visit is dropped, the invitation should be resent by: Send to e-mail at: [email or web address]  Will anyone else be joining your video visit? No      Video Start Time:11:30 am  Video-Visit Details    Type of service:  Video Visit    Video End Time: 12:00 pm    Originating Location (pt. Location): Home    Distant Location (provider location):  [facility]     Platform used for Video Visit: Cassy Winchester is a 19 year old female who is being evaluated via a billable video visit.      The patient has been notified of following:     \"This video visit will be conducted via a call between you and your physician/provider. We have found that certain health care needs can be provided without the need for an in-person physical exam.  This service lets us provide the care you need with a video conversation.  If a prescription is necessary we can send it directly to your pharmacy.  If lab work is needed we can place an order for that and you can then stop by our lab to have the test done at a later time.    Video visits are billed at different rates depending on your insurance coverage.  Please reach out to your insurance provider with any questions.    If during the course of the call the physician/provider feels a video visit is not appropriate, you will not be charged for this service.\"    Patient has given verbal consent for Video visit? Yes  How would you like to obtain your AVS? MyChart  If you are dropped from the video visit, the video invite should be resent to: MyChart  Will anyone else " be joining your video visit? No        Video-Visit Details    Type of service:  Video Visit    Start: 07/24/2020 10:11 am   Stop: 07/24/2020 10:22 am    Originating Location (pt. Location): Home    Distant Location (provider location):  [facility]     Platform used for Video Visit: [facility]                                                                               - Endocrinology follow up -    Reason for visit/consult: Hyperthyroidism, Graves disease    Primary care provider: Leidy Carrizales      Assessment and Plan  20 year old female with hyperthyroidism    # Grave' disease  TSI, TSH receptor Ab positive. Diagnosed Graves disease in August 2019. Taking methimazole 1 year and now (current dose methimazole 5 mg)  euthryoid. Rather some symptoms of hypothyroidism (constipation, weight gain 15 lb over 6 month, fatigue), we will reduce the dose  , then currenlty half pill Mon/Wed/Fri only and doing well. Lab euthryoid.     - TSH, free T4, total T3 prior to next appt    - Continue the dose of methimazole 2.5 mg daily only on Monday/Wednesday/Friday for the next 6 month       # Tachycardia  Resolved,     # menstrual cycle  On BCP every 3 month       Follow up with me in 6 month      Lashonda Vallecillo MD  Staff Physician  Endocrinology and Metabolism  License: [id number]    Interval History as of 1/15/2021 : Patient has been doing well. Last seen 6 month ago . Medication compliance excellent  . 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  Interval History as of 7/24/2020 : Patient has been doing well. Last seen 7 month ago . Medication compliance excellent. Taking methimazole 1 year and now (current dose methimazole 5 mg)  euthryoid. Rather some symptoms of hypothyroidism (constipation, weight gain, fatigue), we will reduce the dose.  Interval History as of 1/24/2020 : Patient has been doing well.  Medication compliance excellent  . New event includes : occasional HA, no N/V. No palpitation. eue  symptoms getting better. 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   Interval History as of 10/18/2019 Diagnosed in August 2019 currently she is on methimazole 5 mg and propranolol 2 0 mg twice daily.  Her parents are taking care of her medication and that she is compliant.  She still mentioned fatigue, insomnia, however by examining her today her tachycardia resolved and she had a slight swelling to the size of goiter.  TSH is still suppressed however free T4 became normalized.   HPI: A 17 yo female here for the consultation of her hyperthyroidism.  Patient came with her parents today.     Patient mentioned for the past 6 months she started to have mild dysphagia when she eats breakfast and some sore throat.  Also excessive fatigue, insomnia.  She also started to have eye itchiness past several months, she went to ophthalmologist her vision was okay.   She gained 5 pounds since April 2019 she is hungry was that time.   She usually has constipation using the laxative however she started to have some diarrhea.   Her mood tends to be more depressed recently and also increase anxiety level according to the parents.   Patient also has family history of thyroid condition in her maternal side.  Mother : hemithyroidectomy. Maternal aunt: goiter  maternal grandmother: DM    Past Medical/Surgical History:  Past Medical History:   Diagnosis Date     ADHD (attention deficit hyperactivity disorder)     [place] diagnosis     Autism     autism spectrum disorder     Mental retardation     [place] diagnosis     No past surgical history on file.    Allergies:  No Known Allergies    Current Medications   Current Outpatient Medications   Medication     cholecalciferol (VITAMIN D3) 25 mcg (1000 units) capsule     desogestrel-ethinyl estradiol (APRI) 0.15-30 MG-MCG tablet     docusate sodium (COLACE) 100 MG tablet     methimazole (TAPAZOLE) 5 MG tablet     Multiple Vitamins-Minerals (MULTIVITAL PO)     polyethylene glycol (MIRALAX) powder      sertraline (ZOLOFT) 100 MG tablet     vitamin C (ASCORBIC ACID) 500 MG tablet     No current facility-administered medications for this visit.        Family History:  Family History   Problem Relation Age of Onset     Asthma No family hx of      Cerebrovascular Disease No family hx of      Depression No family hx of      Obesity No family hx of    Mother : hemithyroidectomy. 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 Otherwise a complete 14 point review of systems was taken and is negative unless stated in the history above.      Physical Exam:   There were no vitals taken for this visit.     General: well appearing, no acute distress, pleasant and conversant,   Mental Status/neuro: alert and oriented  Face: symmetrical, normal facial color  Eyes: anicteric, no proptosis or lid lag  Neck: suppler, no lymphadenopahty  Resp: no acute distress  Hands: No tremor bilateral      Labs : I reviewed data from epic and extract and summarize the pertinent data here.      8/30/2019 12:21   Thyrotropin Receptor Antibody 5.44 (H)      Ref. Range 8/30/2019 12:21   Thyroid Stim Immunog Latest Ref Range: <=1.3 TSI index 1.9 (H)        10/11/2019 15:06   T4 Free 1.41   Triiodothyronine (T3) 234 (H)   TSH <0.01 (L)        Ref. Range 1/7/2016 14:21 1/24/2017 08:15 4/3/2019 16:42 8/22/2019 15:07 8/27/2019 07:46   T4 Free Latest Ref Range: 0.76 - 1.46 ng/dL 1.10 1.29 1.13 1.85 (H) 1.77 (H)   TSH Latest Ref Range: 0.40 - 4.00 mU/L 1.13 0.81 0.13 (L) <0.01 (L) <0.01 (L)   Free T3 Latest Ref Range: 2.3 - 4.2 pg/mL    6.3 (H)     Thyroglobulin Antibody Latest Ref Range: <40 IU/mL     <20   Thyroid Peroxidase Antibody Latest Ref Range: <35 IU/mL     101 (H)     Lab Results   Component Value Date     08/27/2019      Lab Results   Component Value Date    POTASSIUM 4.0 08/27/2019     Lab Results   Component Value Date    CHLORIDE 107 08/27/2019     Lab Results   Component Value Date    MAIA 8.7 08/27/2019     Lab Results   Component Value Date    CO2 23 08/27/2019     Lab Results   Component Value Date    BUN 11 08/27/2019     Lab Results   Component Value Date    CR 0.45 08/27/2019     Lab Results   Component Value Date    GLC 86 08/27/2019     Lab Results   Component Value Date    TSH <0.01 08/27/2019     Lab Results   Component Value Date    T4 1.77 08/27/2019     Lab Results   Component Value Date    A1C 5.6 03/23/2015                     Again, thank you for allowing me to participate in the care of your patient.        Sincerely,        Lashonda Vallecillo MD

## 2021-01-15 NOTE — PROGRESS NOTES
"Maren is a 20 year old who is being evaluated via a billable video visit.      How would you like to obtain your AVS? MyChart  If the video visit is dropped, the invitation should be resent by: Send to e-mail at: [email or web address]  Will anyone else be joining your video visit? No      Video Start Time:11:30 am  Video-Visit Details    Type of service:  Video Visit    Video End Time: 12:00 pm    Originating Location (pt. Location): Home    Distant Location (provider location):  [facility]     Platform used for Video Visit: Deandre[employer]berto     Maren Winchester is a 19 year old female who is being evaluated via a billable video visit.      The patient has been notified of following:     \"This video visit will be conducted via a call between you and your physician/provider. We have found that certain health care needs can be provided without the need for an in-person physical exam.  This service lets us provide the care you need with a video conversation.  If a prescription is necessary we can send it directly to your pharmacy.  If lab work is needed we can place an order for that and you can then stop by our lab to have the test done at a later time.    Video visits are billed at different rates depending on your insurance coverage.  Please reach out to your insurance provider with any questions.    If during the course of the call the physician/provider feels a video visit is not appropriate, you will not be charged for this service.\"    Patient has given verbal consent for Video visit? Yes  How would you like to obtain your AVS? MyChart  If you are dropped from the video visit, the video invite should be resent to: [employer]  Will anyone else be joining your video visit? No        Video-Visit Details    Type of service:  Video Visit    Start: 07/24/2020 10:11 am   Stop: 07/24/2020 10:22 am    Originating Location (pt. Location): Home    Distant Location (provider location):  [facility]     Platform used for Video Visit: [facility]                                                                               - Endocrinology follow up -    Reason for visit/consult: Hyperthyroidism, Graves disease    Primary care provider: Leidy Carrizales      Assessment and Plan  20 year old female with hyperthyroidism    # Grave' disease  TSI, TSH receptor Ab positive. Diagnosed Graves disease in August 2019. Taking methimazole 1 year and now (current dose methimazole 5 mg)  euthryoid. Rather some symptoms of hypothyroidism (constipation, weight gain 15 lb over 6 month, fatigue), we will reduce the dose  , then currenlty half pill Mon/Wed/Fri only and doing well. Lab euthryoid.     - TSH, free T4, total T3 prior to next appt    - Continue the dose of methimazole 2.5 mg daily only on Monday/Wednesday/Friday for the next 6 month       # Tachycardia  Resolved,     # menstrual cycle  On BCP every 3 month       Follow up with me in 6 month      Lashonda Vallecillo MD  Staff Physician  Endocrinology and Metabolism  License: [id number]    Interval History as of 1/15/2021 : Patient has been doing well. Last seen 6 month ago . Medication compliance excellent  . 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  Interval History as of 7/24/2020 : Patient has been doing well. Last seen 7 month ago . Medication compliance excellent. Taking methimazole 1 year and now (current dose methimazole 5 mg)  euthryoid. Rather some symptoms of hypothyroidism (constipation, weight gain, fatigue), we will reduce the dose.  Interval History as of 1/24/2020 : Patient has been doing well.  Medication compliance excellent  . New event includes : occasional HA, no N/V. No palpitation. eue symptoms getting better. Enjoying working [employer] and [employer].   Interval History as of 10/18/2019 Diagnosed in August 2019 currently she is on methimazole 5 mg and propranolol 2 0 mg twice daily.  Her parents are taking care of her medication and that she is  compliant.  She still mentioned fatigue, insomnia, however by examining her today her tachycardia resolved and she had a slight swelling to the size of goiter.  TSH is still suppressed however free T4 became normalized.   HPI: A 17 yo female here for the consultation of her hyperthyroidism.  Patient came with her parents today.     Patient mentioned for the past 6 months she started to have mild dysphagia when she eats breakfast and some sore throat.  Also excessive fatigue, insomnia.  She also started to have eye itchiness past several months, she went to ophthalmologist her vision was okay.   She gained 5 pounds since April 2019 she is hungry was that time.   She usually has constipation using the laxative however she started to have some diarrhea.   Her mood tends to be more depressed recently and also increase anxiety level according to the parents.   Patient also has family history of thyroid condition in her maternal side.  Mother : hemithyroidectomy. Maternal aunt: goiter  maternal grandmother: DM    Past Medical/Surgical History:  Past Medical History:   Diagnosis Date     ADHD (attention deficit hyperactivity disorder)     [place] diagnosis     Autism     autism spectrum disorder     Mental retardation     [place] diagnosis     No past surgical history on file.    Allergies:  No Known Allergies    Current Medications   Current Outpatient Medications   Medication     cholecalciferol (VITAMIN D3) 25 mcg (1000 units) capsule     desogestrel-ethinyl estradiol (APRI) 0.15-30 MG-MCG tablet     docusate sodium (COLACE) 100 MG tablet     methimazole (TAPAZOLE) 5 MG tablet     Multiple Vitamins-Minerals (MULTIVITAL PO)     polyethylene glycol (MIRALAX) powder     sertraline (ZOLOFT) 100 MG tablet     vitamin C (ASCORBIC ACID) 500 MG tablet     No current facility-administered medications for this visit.        Family History:  Family History   Problem Relation Age of Onset     Asthma No family hx of      Cerebrovascular  Disease No family hx of      Depression No family hx of      Obesity No family hx of    Mother : hemithyroidectomy. Maternal aunt: goiter  maternal grandmother: DM    Social History:  Social History     Tobacco Use     Smoking status: Never Smoker     Smokeless tobacco: Never Used   Substance Use Topics     Alcohol use: No     Alcohol/week: 0.0 standard drinks   Lives with parents, 1 brother and 1 sister  Sister: seizure disorder,   Brother: healthy  Transition program, [employer] store, cafeteria,     ROS:  Full review of systems taken with the help of the intake sheet. Otherwise a complete 14 point review of systems was taken and is negative unless stated in the history above.      Physical Exam:   There were no vitals taken for this visit.     General: well appearing, no acute distress, pleasant and conversant,   Mental Status/neuro: alert and oriented  Face: symmetrical, normal facial color  Eyes: anicteric, no proptosis or lid lag  Neck: suppler, no lymphadenopahty  Resp: no acute distress  Hands: No tremor bilateral      Labs : I reviewed data from epic and extract and summarize the pertinent data here.      8/30/2019 12:21   Thyrotropin Receptor Antibody 5.44 (H)      Ref. Range 8/30/2019 12:21   Thyroid Stim Immunog Latest Ref Range: <=1.3 TSI index 1.9 (H)        10/11/2019 15:06   T4 Free 1.41   Triiodothyronine (T3) 234 (H)   TSH <0.01 (L)        Ref. Range 1/7/2016 14:21 1/24/2017 08:15 4/3/2019 16:42 8/22/2019 15:07 8/27/2019 07:46   T4 Free Latest Ref Range: 0.76 - 1.46 ng/dL 1.10 1.29 1.13 1.85 (H) 1.77 (H)   TSH Latest Ref Range: 0.40 - 4.00 mU/L 1.13 0.81 0.13 (L) <0.01 (L) <0.01 (L)   Free T3 Latest Ref Range: 2.3 - 4.2 pg/mL    6.3 (H)    Thyroglobulin Antibody Latest Ref Range: <40 IU/mL     <20   Thyroid Peroxidase Antibody Latest Ref Range: <35 IU/mL     101 (H)     Lab Results   Component Value Date     08/27/2019      Lab Results   Component Value Date    POTASSIUM 4.0 08/27/2019     Lab  Results   Component Value Date    CHLORIDE 107 08/27/2019     Lab Results   Component Value Date    MAIA 8.7 08/27/2019     Lab Results   Component Value Date    CO2 23 08/27/2019     Lab Results   Component Value Date    BUN 11 08/27/2019     Lab Results   Component Value Date    CR 0.45 08/27/2019     Lab Results   Component Value Date    GLC 86 08/27/2019     Lab Results   Component Value Date    TSH <0.01 08/27/2019     Lab Results   Component Value Date    T4 1.77 08/27/2019     Lab Results   Component Value Date    A1C 5.6 03/23/2015

## 2021-04-18 ENCOUNTER — HEALTH MAINTENANCE LETTER (OUTPATIENT)
Age: 21
End: 2021-04-18

## 2021-05-04 DIAGNOSIS — E05.00 GRAVES' DISEASE: ICD-10-CM

## 2021-05-05 RX ORDER — METHIMAZOLE 5 MG/1
TABLET ORAL
Qty: 90 TABLET | Refills: 1 | Status: SHIPPED | OUTPATIENT
Start: 2021-05-05 | End: 2021-07-16

## 2021-05-05 NOTE — TELEPHONE ENCOUNTER
methimazole (TAPAZOLE) 5 MG tablet    Last Written Prescription Date:  7/24/20  Last Fill Quantity: 45,   # refills: 3  Last Office Visit : 1/15/21  Future Office visit:  7/16/21    Routing refill request to provider for review/approval because:  Drug not on the [facility], [facility] or [facility] refill protocol or controlled substance  Prescription refill request does not match med list  Thank-you, Aida JONES RN Medication Refill Team

## 2021-05-28 ENCOUNTER — MYC MEDICAL ADVICE (OUTPATIENT)
Dept: FAMILY MEDICINE | Facility: CLINIC | Age: 21
End: 2021-05-28

## 2021-06-01 DIAGNOSIS — F41.1 GAD (GENERALIZED ANXIETY DISORDER): ICD-10-CM

## 2021-06-02 RX ORDER — SERTRALINE HYDROCHLORIDE 100 MG/1
100 TABLET, FILM COATED ORAL DAILY
Qty: 90 TABLET | Refills: 1 | Status: SHIPPED | OUTPATIENT
Start: 2021-06-02 | End: 2021-06-08

## 2021-06-04 ENCOUNTER — MYC MEDICAL ADVICE (OUTPATIENT)
Dept: FAMILY MEDICINE | Facility: CLINIC | Age: 21
End: 2021-06-04

## 2021-06-04 DIAGNOSIS — F41.1 GAD (GENERALIZED ANXIETY DISORDER): Primary | ICD-10-CM

## 2021-06-07 DIAGNOSIS — F41.1 GAD (GENERALIZED ANXIETY DISORDER): ICD-10-CM

## 2021-06-07 NOTE — TELEPHONE ENCOUNTER
Pt increased dose to 150mg daily of her sertraline.  Can we get a new Rx for sertraline 100mg taking 1 & 1/2 tablets (150mg) daily so pt has enough medication to get through until her upcoming appointment?  Thanks.    Zita Claros, PharmD  [facility] Pharmacist  [facility]

## 2021-06-08 RX ORDER — SERTRALINE HYDROCHLORIDE 100 MG/1
150 TABLET, FILM COATED ORAL DAILY
Qty: 135 TABLET | Refills: 1 | Status: SHIPPED | OUTPATIENT
Start: 2021-06-08 | End: 2021-06-30

## 2021-06-08 RX ORDER — SERTRALINE HYDROCHLORIDE 100 MG/1
150 TABLET, FILM COATED ORAL DAILY
Qty: 90 TABLET | Refills: 1 | Status: CANCELLED | OUTPATIENT
Start: 2021-06-08

## 2021-06-09 RX ORDER — SERTRALINE HYDROCHLORIDE 100 MG/1
150 TABLET, FILM COATED ORAL DAILY
Qty: 135 TABLET | Refills: 3 | Status: SHIPPED | OUTPATIENT
Start: 2021-06-09 | End: 2021-08-27

## 2021-06-21 DIAGNOSIS — K59.00 CONSTIPATION, UNSPECIFIED CONSTIPATION TYPE: ICD-10-CM

## 2021-06-21 RX ORDER — DOCUSATE SODIUM 100 MG/1
CAPSULE, LIQUID FILLED ORAL
Qty: 90 CAPSULE | Refills: 3 | Status: SHIPPED | OUTPATIENT
Start: 2021-06-21 | End: 2021-08-27

## 2021-06-21 NOTE — TELEPHONE ENCOUNTER
Prescription approved per [facility] Refill Protocol.    Maryellen Enrique RN

## 2021-06-23 ENCOUNTER — MYC MEDICAL ADVICE (OUTPATIENT)
Dept: ENDOCRINOLOGY | Facility: CLINIC | Age: 21
End: 2021-06-23

## 2021-06-30 ENCOUNTER — OFFICE VISIT (OUTPATIENT)
Dept: FAMILY MEDICINE | Facility: CLINIC | Age: 21
End: 2021-06-30
Payer: MEDICAID

## 2021-06-30 VITALS
HEIGHT: 65 IN | BODY MASS INDEX: 27.66 KG/M2 | HEART RATE: 117 BPM | OXYGEN SATURATION: 97 % | SYSTOLIC BLOOD PRESSURE: 122 MMHG | RESPIRATION RATE: 18 BRPM | WEIGHT: 166 LBS | TEMPERATURE: 96.4 F | DIASTOLIC BLOOD PRESSURE: 82 MMHG

## 2021-06-30 DIAGNOSIS — F41.1 GAD (GENERALIZED ANXIETY DISORDER): Primary | ICD-10-CM

## 2021-06-30 DIAGNOSIS — K59.00 CONSTIPATION, UNSPECIFIED CONSTIPATION TYPE: ICD-10-CM

## 2021-06-30 PROCEDURE — 99214 OFFICE O/P EST MOD 30 MIN: CPT | Performed by: FAMILY MEDICINE

## 2021-06-30 RX ORDER — POLYETHYLENE GLYCOL 3350 17 G/17G
1 POWDER, FOR SOLUTION ORAL PRN
Qty: 850 G | Refills: 3 | Status: SHIPPED | OUTPATIENT
Start: 2021-06-30 | End: 2024-04-28

## 2021-06-30 ASSESSMENT — ANXIETY QUESTIONNAIRES
2. NOT BEING ABLE TO STOP OR CONTROL WORRYING: NEARLY EVERY DAY
IF YOU CHECKED OFF ANY PROBLEMS ON THIS QUESTIONNAIRE, HOW DIFFICULT HAVE THESE PROBLEMS MADE IT FOR YOU TO DO YOUR WORK, TAKE CARE OF THINGS AT HOME, OR GET ALONG WITH OTHER PEOPLE: VERY DIFFICULT
6. BECOMING EASILY ANNOYED OR IRRITABLE: NEARLY EVERY DAY
GAD7 TOTAL SCORE: 18
1. FEELING NERVOUS, ANXIOUS, OR ON EDGE: NEARLY EVERY DAY
3. WORRYING TOO MUCH ABOUT DIFFERENT THINGS: NEARLY EVERY DAY
5. BEING SO RESTLESS THAT IT IS HARD TO SIT STILL: NOT AT ALL
7. FEELING AFRAID AS IF SOMETHING AWFUL MIGHT HAPPEN: NEARLY EVERY DAY

## 2021-06-30 ASSESSMENT — MIFFLIN-ST. JEOR: SCORE: 1507.5

## 2021-06-30 ASSESSMENT — PAIN SCALES - GENERAL: PAINLEVEL: NO PAIN (0)

## 2021-06-30 ASSESSMENT — PATIENT HEALTH QUESTIONNAIRE - PHQ9: 5. POOR APPETITE OR OVEREATING: NEARLY EVERY DAY

## 2021-06-30 NOTE — PROGRESS NOTES
Assessment & Plan       ICD-10-CM    1. VANNESSA (generalized anxiety disorder)  F41.1 FLUoxetine (PROZAC) 20 MG capsule   2. Constipation, unspecified constipation type  K59.00 polyethylene glycol (MIRALAX) 17 GM/Dose powder        Maren is a 20 year old female with autism and anxiety. Anxiety has increased with more outbursts at home, anger issues, fatigue, decreased appetite, and feeling scared. Sertraline initially seemed to help with similar symptoms but after last dose increase Maren's symptoms continue to not be manageable. She has been on Fluoxetine in the past with good results, will discontinue Sertraline and start Fluoxetine 20 mg.     Encouraged establishing more support with a therapist that works with autistic patients.     Concern about difficulty having a BM and constipation, regular use of Miralax prescribed and hydrate.        Return in about 4 weeks (around 7/28/2021).    Trent Coley MD  [facility]     Maren is a 20 year old female who presents to clinic today for the following health issues     HPI       Anxiety Follow-Up/ Recheck Meds     How are you doing with your anxiety since your last visit? No change    Are you having other symptoms that might be associated with anxiety? Yes:  anger, lack of energy, eating, feels sick     Have you had a significant life event? No     Are you feeling depressed? No    Do you have any concerns with your use of alcohol or other drugs? No    Social History     Tobacco Use     Smoking status: Never Smoker     Smokeless tobacco: Never Used   Substance Use Topics     Alcohol use: No     Alcohol/week: 0.0 standard drinks     Drug use: No     VANNESSA-7 SCORE 9/3/2020 11/17/2020 6/30/2021   Total Score - - -   Total Score 11 5 18     PHQ 12/3/2018 4/3/2019 8/19/2019   PHQ-9 Total Score 4 6 6   Q9: Thoughts of better off dead/self-harm past 2 weeks Not at all Not at all Not at all     Last PHQ-9 8/19/2019   1.  Little interest  "or pleasure in doing things 1   2.  Feeling down, depressed, or hopeless 0   3.  Trouble falling or staying asleep, or sleeping too much 2   4.  Feeling tired or having little energy 2   5.  Poor appetite or overeating 0   6.  Feeling bad about yourself 0   7.  Trouble concentrating 1   8.  Moving slowly or restless 0   Q9: Thoughts of better off dead/self-harm past 2 weeks 0   PHQ-9 Total Score 6   Difficulty at work, home, or with people -     VANNESSA-7  6/30/2021   1. Feeling nervous, anxious, or on edge 3   2. Not being able to stop or control worrying 3   3. Worrying too much about different things 3   4. Trouble relaxing 3   5. Being so restless that it is hard to sit still 0   6. Becoming easily annoyed or irritable 3   7. Feeling afraid, as if something awful might happen 3   VANNESSA-7 Total Score 18   If you checked any problems, how difficult have they made it for you to do your work, take care of things at home, or get along with other people? Very difficult     Maren in clinic with her mother. Concerns about anxiety, agitation, anger, feeling scared, headache, decreased appetite, and fatigue. Symptoms have increased and concern that Zoloft is not managing her symptoms sufficiently. Denies thoughts of hurting herself or others.  Maren has concerns about bowel movements being hard and difficult to pass. Occasionally using Colace with minimal improvement of bowel movements.   Maren's mother reports previously meeting with a therapist without satisfaction but considering trying again with a new provider.     Review of Systems   Constitutional, HEENT, cardiovascular, pulmonary, gi and gu systems are negative, except as otherwise noted.      Objective    /82   Pulse 117   Temp 96.4  F (35.8  C) (Temporal)   Resp 18   Ht 1.641 m (5' 4.6\")   Wt 75.3 kg (166 lb)   SpO2 97%   BMI 27.97 kg/m       Physical Exam   GENERAL: healthy, alert and no distress  RESP: lungs clear to auscultation - no rales, " rhonchi or wheezes  CV: regular rate and rhythm, normal S1 S2, no S3 or S4, no murmur, click or rub, no peripheral edema and peripheral pulses strong  ABDOMEN: soft, nontender, no hepatosplenomegaly, no masses and bowel sounds normal  MS: no gross musculoskeletal defects noted, no edema  PSYCH: mentation appears normal and affect flat, poor insight but judgement intact.     I am acting as a scribe for this visit. I have written the the note, but final editing is from Dr. Brijesh Alvarez, NP student    This patient was seen and examined by myself as well as the NP student.  The NP student scribed the note and I have reviewed it, edited it appropriately, and agree with the final documentation.     Trent Coley MD

## 2021-07-01 ASSESSMENT — ANXIETY QUESTIONNAIRES: GAD7 TOTAL SCORE: 18

## 2021-07-09 NOTE — TELEPHONE ENCOUNTER
Changed appointment to virtual video on 7/16/21 at 2:15 with Dr. Vallecillo.    Soledad CAAL MA   [facility]   [facility]

## 2021-07-10 DIAGNOSIS — E05.00 GRAVES' DISEASE: ICD-10-CM

## 2021-07-10 LAB
T4 FREE SERPL-MCNC: 0.99 NG/DL (ref 0.76–1.46)
TSH SERPL DL<=0.005 MIU/L-ACNC: 1.62 MU/L (ref 0.4–4)

## 2021-07-10 PROCEDURE — 84443 ASSAY THYROID STIM HORMONE: CPT | Performed by: INTERNAL MEDICINE

## 2021-07-10 PROCEDURE — 84439 ASSAY OF FREE THYROXINE: CPT | Performed by: INTERNAL MEDICINE

## 2021-07-10 PROCEDURE — 84480 ASSAY TRIIODOTHYRONINE (T3): CPT | Performed by: INTERNAL MEDICINE

## 2021-07-10 PROCEDURE — 36415 COLL VENOUS BLD VENIPUNCTURE: CPT | Performed by: INTERNAL MEDICINE

## 2021-07-11 LAB — T3 SERPL-MCNC: 179 NG/DL (ref 60–181)

## 2021-07-16 ENCOUNTER — VIRTUAL VISIT (OUTPATIENT)
Dept: ENDOCRINOLOGY | Facility: CLINIC | Age: 21
End: 2021-07-16
Payer: MEDICAID

## 2021-07-16 DIAGNOSIS — E05.00 GRAVES' DISEASE: Primary | ICD-10-CM

## 2021-07-16 DIAGNOSIS — E05.90 HYPERTHYROIDISM: ICD-10-CM

## 2021-07-16 PROCEDURE — 99214 OFFICE O/P EST MOD 30 MIN: CPT | Mod: 95 | Performed by: INTERNAL MEDICINE

## 2021-07-16 RX ORDER — METHIMAZOLE 5 MG/1
2.5 TABLET ORAL DAILY
Qty: 45 TABLET | Refills: 3 | Status: SHIPPED | OUTPATIENT
Start: 2021-07-16 | End: 2022-08-11

## 2021-07-16 NOTE — LETTER
"    7/16/2021         RE: Maren Winchester  [address] [facility] [address]        Dear Colleague,    Thank you for referring your patient, Maren Winchester, to the [facility]. Please see a copy of my visit note below.    Maren is a 20 year old who is being evaluated via a billable telephone visit.      What phone number would you like to be contacted at? [phone]  How would you like to obtain your AVS? Faustino Naik [facility]  Adult Endocrinology  [facility]          Maren is a 20 year old who is being evaluated via a billable video visit.      How would you like to obtain your AVS? Faustino  If the video visit is dropped, the invitation should be resent by: Send to e-mail at: [email or web address]  Will anyone else be joining your video visit? No      Video Start Time:11:30 am  Video-Visit Details    Type of service:  Video Visit    Video End Time: 12:00 pm    Originating Location (pt. Location): Home    Distant Location (provider location):  [facility]     Platform used for Video Visit: Cassy Winchester is a 19 year old female who is being evaluated via a billable video visit.      The patient has been notified of following:     \"This video visit will be conducted via a call between you and your physician/provider. We have found that certain health care needs can be provided without the need for an in-person physical exam.  This service lets us provide the care you need with a video conversation.  If a prescription is necessary we can send it directly to your pharmacy.  If lab work is needed we can place an order for that and you can then stop by our lab to have the test done at a later time.    Video visits are billed at different rates depending on your insurance coverage.  Please reach out to your insurance provider with any questions.    If during the course of the call the physician/provider feels a " "video visit is not appropriate, you will not be charged for this service.\"    Patient has given verbal consent for Video visit? Yes  How would you like to obtain your AVS? Huyhart  If you are dropped from the video visit, the video invite should be resent to: Faustino  Will anyone else be joining your video visit? No        Video-Visit Details    Type of service:  Video Visit    Start: 07/24/2020 10:11 am   Stop: 07/24/2020 10:22 am    Originating Location (pt. Location): Home    Distant Location (provider location):  [facility]     Platform used for Video Visit: [facility]                                                                               - Endocrinology follow up -    Reason for visit/consult: Hyperthyroidism, Graves disease    Primary care provider: Leidy Carrizales      Assessment and Plan  20 year old female with hyperthyroidism    # Grave' disease  TSI, TSH receptor Ab positive. Diagnosed Graves disease in August 2019. Taking methimazole 1 year and now (current dose methimazole 5 mg)  euthryoid. Rather some symptoms of hypothyroidism (constipation, weight gain 15 lb over 6 month, fatigue), we will reduce the dose  , then currenlty half pill Mon/Wed/Fri for 1 year (since 7/2020) and started to have some palpitation and eye puffiness.     - increase methimazole 2.5 mg daily and recheck TFTs in 1 month     - TSH, free T4, total T3    - TSI, TSH receptor Ab      # Tachycardia  Occasional started again?    # menstrual cycle  On BCP every 3 month       Follow up with me in 3 month      30 minutes spent on the date of the encounter doing chart review, history and exam, documentation and further activities as noted above.    Lashonda Vallecillo MD  Staff Physician  Endocrinology and Metabolism  [facility]  License: [id number]  Pager: [phone]  Interval History as of 7/16/2021 : Patient has been doing well, but slight puffy eye. Occasional palpitation? Medication compliance " excellent  On methimazole 2.5 mg Mon/Wed/Fri for 1 year.  Interval History as of 1/15/2021 : Patient has been doing well. Last seen 6 month ago . Medication compliance excellent  . 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  Interval History as of 7/24/2020 : Patient has been doing well. Last seen 7 month ago . Medication compliance excellent. Taking methimazole 1 year and now (current dose methimazole 5 mg)  euthryoid. Rather some symptoms of hypothyroidism (constipation, weight gain, fatigue), we will reduce the dose.  Interval History as of 1/24/2020 : Patient has been doing well.  Medication compliance excellent  . New event includes : occasional HA, no N/V. No palpitation. eue symptoms getting better. Enjoying working [employer].   Interval History as of 10/18/2019 Diagnosed in August 2019 currently she is on methimazole 5 mg and propranolol 2 0 mg twice daily.  Her parents are taking care of her medication and that she is compliant.  She still mentioned fatigue, insomnia, however by examining her today her tachycardia resolved and she had a slight swelling to the size of goiter.  TSH is still suppressed however free T4 became normalized.   HPI: A 17 yo female here for the consultation of her hyperthyroidism.  Patient came with her parents today.     Patient mentioned for the past 6 months she started to have mild dysphagia when she eats breakfast and some sore throat.  Also excessive fatigue, insomnia.  She also started to have eye itchiness past several months, she went to ophthalmologist her vision was okay.   She gained 5 pounds since April 2019 she is hungry was that time.   She usually has constipation using the laxative however she started to have some diarrhea.   Her mood tends to be more depressed recently and also increase anxiety level according to the parents.   Patient also has family history of thyroid condition in her maternal side.  Mother : hemithyroidectomy. Maternal  aunt: goiter  maternal grandmother: DM    Past Medical/Surgical History:  Past Medical History:   Diagnosis Date     ADHD (attention deficit hyperactivity disorder)     Machias diagnosis     Autism     autism spectrum disorder     Mental retardation     Machias diagnosis     No past surgical history on file.    Allergies:  No Known Allergies    Current Medications   Current Outpatient Medications   Medication     cholecalciferol (VITAMIN D3) 25 mcg (1000 units) capsule     desogestrel-ethinyl estradiol (APRI) 0.15-30 MG-MCG tablet      MG capsule     FLUoxetine (PROZAC) 20 MG capsule     methimazole (TAPAZOLE) 5 MG tablet     Multiple Vitamins-Minerals (MULTIVITAL PO)     polyethylene glycol (MIRALAX) 17 GM/Dose powder     vitamin C (ASCORBIC ACID) 500 MG tablet     sertraline (ZOLOFT) 100 MG tablet     No current facility-administered medications for this visit.       Family History:  Family History   Problem Relation Age of Onset     Asthma No family hx of      Cerebrovascular Disease No family hx of      Depression No family hx of      Obesity No family hx of    Mother : hemithyroidectomy. Maternal aunt: goiter  maternal grandmother: DM    Social History:  Social History     Tobacco Use     Smoking status: Never Smoker     Smokeless tobacco: Never Used   Substance Use Topics     Alcohol use: No     Alcohol/week: 0.0 standard drinks   Lives with parents, 1 brother and 1 sister  Sister: seizure disorder,   Brother: healthy  Transition program, [employer], cafeteria,     ROS:  Full review of systems taken with the help of the intake sheet. Otherwise a complete 14 point review of systems was taken and is negative unless stated in the history above.      Physical Exam:   There were no vitals taken for this visit.     General: well appearing, no acute distress, pleasant and conversant,   Mental Status/neuro: alert and oriented  Face: symmetrical, normal facial color  Eyes: anicteric, no proptosis or lid lag, slight  pink and puffy  Neck: suppler, no lymphadenopahty  Resp: no acute distress  Hands: No tremor bilateral      Labs : I reviewed data from epic and extract and summarize the pertinent data here.      8/30/2019 12:21   Thyrotropin Receptor Antibody 5.44 (H)      Ref. Range 8/30/2019 12:21   Thyroid Stim Immunog Latest Ref Range: <=1.3 TSI index 1.9 (H)        10/11/2019 15:06   T4 Free 1.41   Triiodothyronine (T3) 234 (H)   TSH <0.01 (L)        Ref. Range 1/7/2016 14:21 1/24/2017 08:15 4/3/2019 16:42 8/22/2019 15:07 8/27/2019 07:46   T4 Free Latest Ref Range: 0.76 - 1.46 ng/dL 1.10 1.29 1.13 1.85 (H) 1.77 (H)   TSH Latest Ref Range: 0.40 - 4.00 mU/L 1.13 0.81 0.13 (L) <0.01 (L) <0.01 (L)   Free T3 Latest Ref Range: 2.3 - 4.2 pg/mL    6.3 (H)    Thyroglobulin Antibody Latest Ref Range: <40 IU/mL     <20   Thyroid Peroxidase Antibody Latest Ref Range: <35 IU/mL     101 (H)     Lab Results   Component Value Date     08/27/2019      Lab Results   Component Value Date    POTASSIUM 4.0 08/27/2019     Lab Results   Component Value Date    CHLORIDE 107 08/27/2019     Lab Results   Component Value Date    MAIA 8.7 08/27/2019     Lab Results   Component Value Date    CO2 23 08/27/2019     Lab Results   Component Value Date    BUN 11 08/27/2019     Lab Results   Component Value Date    CR 0.45 08/27/2019     Lab Results   Component Value Date    GLC 86 08/27/2019     Lab Results   Component Value Date    TSH <0.01 08/27/2019     Lab Results   Component Value Date    T4 1.77 08/27/2019     Lab Results   Component Value Date    A1C 5.6 03/23/2015                   Again, thank you for allowing me to participate in the care of your patient.        Sincerely,        Lashonda Vallecillo MD

## 2021-07-16 NOTE — PROGRESS NOTES
"Maren is a 20 year old who is being evaluated via a billable video visit.      How would you like to obtain your AVS? MyChart  If the video visit is dropped, the invitation should be resent by: Send to e-mail at: [email or web address]  Will anyone else be joining your video visit? No      Video Start Time:11:30 am  Video-Visit Details    Type of service:  Video Visit    Video End Time: 12:00 pm    Originating Location (pt. Location): Home    Distant Location (provider location):  [facility]     Platform used for Video Visit: DeandreAlberto     Maren Winchester is a 19 year old female who is being evaluated via a billable video visit.      The patient has been notified of following:     \"This video visit will be conducted via a call between you and your physician/provider. We have found that certain health care needs can be provided without the need for an in-person physical exam.  This service lets us provide the care you need with a video conversation.  If a prescription is necessary we can send it directly to your pharmacy.  If lab work is needed we can place an order for that and you can then stop by our lab to have the test done at a later time.    Video visits are billed at different rates depending on your insurance coverage.  Please reach out to your insurance provider with any questions.    If during the course of the call the physician/provider feels a video visit is not appropriate, you will not be charged for this service.\"    Patient has given verbal consent for Video visit? Yes  How would you like to obtain your AVS? MyChart  If you are dropped from the video visit, the video invite should be resent to: Faustino  Will anyone else be joining your video visit? No        Video-Visit Details    Type of service:  Video Visit    Start: 07/24/2020 10:11 am   Stop: 07/24/2020 10:22 am    Originating Location (pt. Location): Home    Distant Location (provider location):  [facility]     Platform used for Video Visit: [facility]                                                                               - Endocrinology follow up -    Reason for visit/consult: Hyperthyroidism, Graves disease    Primary care provider: Leidy Carrizales      Assessment and Plan  20 year old female with hyperthyroidism    # Grave' disease  TSI, TSH receptor Ab positive. Diagnosed Graves disease in August 2019. Taking methimazole 1 year and now (current dose methimazole 5 mg)  euthryoid. Rather some symptoms of hypothyroidism (constipation, weight gain 15 lb over 6 month, fatigue), we will reduce the dose  , then currenlty half pill Mon/Wed/Fri for 1 year (since 7/2020) and started to have some palpitation and eye puffiness.     - increase methimazole 2.5 mg daily and recheck TFTs in 1 month     - TSH, free T4, total T3    - TSI, TSH receptor Ab      # Tachycardia  Occasional started again?    # menstrual cycle  On BCP every 3 month       Follow up with me in 3 month      30 minutes spent on the date of the encounter doing chart review, history and exam, documentation and further activities as noted above.    Lashonda Vallecillo MD  Staff Physician  Endocrinology and Metabolism  [facility]  License[id number]  Pager: [phone]  Interval History as of 7/16/2021 : Patient has been doing well, but slight puffy eye. Occasional palpitation? Medication compliance excellent  On methimazole 2.5 mg Mon/Wed/Fri for 1 year.  Interval History as of 1/15/2021 : Patient has been doing well. Last seen 6 month ago . Medication compliance excellent  . 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  Interval History as of 7/24/2020 : Patient has been doing well. Last seen 7 month ago . Medication compliance excellent. Taking methimazole 1 year and now (current dose methimazole 5 mg)  euthryoid. Rather some symptoms of hypothyroidism (constipation, weight gain, fatigue), we will reduce the dose.  Interval History  as of 1/24/2020 : Patient has been doing well.  Medication compliance excellent  . New event includes : occasional HA, no N/V. No palpitation. eue symptoms getting better. 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   Interval History as of 10/18/2019 Diagnosed in August 2019 currently she is on methimazole 5 mg and propranolol 2 0 mg twice daily.  Her parents are taking care of her medication and that she is compliant.  She still mentioned fatigue, insomnia, however by examining her today her tachycardia resolved and she had a slight swelling to the size of goiter.  TSH is still suppressed however free T4 became normalized.   HPI: A 17 yo female here for the consultation of her hyperthyroidism.  Patient came with her parents today.     Patient mentioned for the past 6 months she started to have mild dysphagia when she eats breakfast and some sore throat.  Also excessive fatigue, insomnia.  She also started to have eye itchiness past several months, she went to ophthalmologist her vision was okay.   She gained 5 pounds since April 2019 she is hungry was that time.   She usually has constipation using the laxative however she started to have some diarrhea.   Her mood tends to be more depressed recently and also increase anxiety level according to the parents.   Patient also has family history of thyroid condition in her maternal side.  Mother : hemithyroidectomy. Maternal aunt: goiter  maternal grandmother: DM    Past Medical/Surgical History:  Past Medical History:   Diagnosis Date     ADHD (attention deficit hyperactivity disorder)     Tolley diagnosis     Autism     autism spectrum disorder     Mental retardation     Tolley diagnosis     No past surgical history on file.    Allergies:  No Known Allergies    Current Medications   Current Outpatient Medications   Medication     cholecalciferol (VITAMIN D3) 25 mcg (1000 units) capsule     desogestrel-ethinyl estradiol (APRI) 0.15-30 MG-MCG tablet      MG capsule      FLUoxetine (PROZAC) 20 MG capsule     methimazole (TAPAZOLE) 5 MG tablet     Multiple Vitamins-Minerals (MULTIVITAL PO)     polyethylene glycol (MIRALAX) 17 GM/Dose powder     vitamin C (ASCORBIC ACID) 500 MG tablet     sertraline (ZOLOFT) 100 MG tablet     No current facility-administered medications for this visit.       Family History:  Family History   Problem Relation Age of Onset     Asthma No family hx of      Cerebrovascular Disease No family hx of      Depression No family hx of      Obesity No family hx of    Mother : hemithyroidectomy. Maternal aunt: goiter  maternal grandmother: DM    Social History:  Social History     Tobacco Use     Smoking status: Never Smoker     Smokeless tobacco: Never Used   Substance Use Topics     Alcohol use: No     Alcohol/week: 0.0 standard drinks   Lives with parents, 1 brother and 1 sister  Sister: seizure disorder,   Brother: healthy  Transition program, [employer], cafeteria,     ROS:  Full review of systems taken with the help of the intake sheet. Otherwise a complete 14 point review of systems was taken and is negative unless stated in the history above.      Physical Exam:   There were no vitals taken for this visit.     General: well appearing, no acute distress, pleasant and conversant,   Mental Status/neuro: alert and oriented  Face: symmetrical, normal facial color  Eyes: anicteric, no proptosis or lid lag, slight pink and puffy  Neck: suppler, no lymphadenopahty  Resp: no acute distress  Hands: No tremor bilateral      Labs : I reviewed data from epic and extract and summarize the pertinent data here.      8/30/2019 12:21   Thyrotropin Receptor Antibody 5.44 (H)      Ref. Range 8/30/2019 12:21   Thyroid Stim Immunog Latest Ref Range: <=1.3 TSI index 1.9 (H)        10/11/2019 15:06   T4 Free 1.41   Triiodothyronine (T3) 234 (H)   TSH <0.01 (L)        Ref. Range 1/7/2016 14:21 1/24/2017 08:15 4/3/2019 16:42 8/22/2019 15:07 8/27/2019 07:46   T4 Free Latest Ref  Range: 0.76 - 1.46 ng/dL 1.10 1.29 1.13 1.85 (H) 1.77 (H)   TSH Latest Ref Range: 0.40 - 4.00 mU/L 1.13 0.81 0.13 (L) <0.01 (L) <0.01 (L)   Free T3 Latest Ref Range: 2.3 - 4.2 pg/mL    6.3 (H)    Thyroglobulin Antibody Latest Ref Range: <40 IU/mL     <20   Thyroid Peroxidase Antibody Latest Ref Range: <35 IU/mL     101 (H)     Lab Results   Component Value Date     08/27/2019      Lab Results   Component Value Date    POTASSIUM 4.0 08/27/2019     Lab Results   Component Value Date    CHLORIDE 107 08/27/2019     Lab Results   Component Value Date    MAIA 8.7 08/27/2019     Lab Results   Component Value Date    CO2 23 08/27/2019     Lab Results   Component Value Date    BUN 11 08/27/2019     Lab Results   Component Value Date    CR 0.45 08/27/2019     Lab Results   Component Value Date    GLC 86 08/27/2019     Lab Results   Component Value Date    TSH <0.01 08/27/2019     Lab Results   Component Value Date    T4 1.77 08/27/2019     Lab Results   Component Value Date    A1C 5.6 03/23/2015

## 2021-07-16 NOTE — PROGRESS NOTES
Maren is a 20 year old who is being evaluated via a billable telephone visit.      What phone number would you like to be contacted at? [phone]  How would you like to obtain your AVS? Faustino Naik [facility]  Adult Endocrinology  [facility]

## 2021-08-13 ENCOUNTER — MYC MEDICAL ADVICE (OUTPATIENT)
Dept: FAMILY MEDICINE | Facility: CLINIC | Age: 21
End: 2021-08-13

## 2021-08-15 ENCOUNTER — LAB (OUTPATIENT)
Dept: LAB | Facility: CLINIC | Age: 21
End: 2021-08-15
Payer: MEDICAID

## 2021-08-15 DIAGNOSIS — E05.00 GRAVES' DISEASE: ICD-10-CM

## 2021-08-15 LAB
T3 SERPL-MCNC: 169 NG/DL (ref 60–181)
T4 FREE SERPL-MCNC: 0.97 NG/DL (ref 0.76–1.46)
TSH SERPL DL<=0.005 MIU/L-ACNC: 1.42 MU/L (ref 0.4–4)

## 2021-08-15 PROCEDURE — 84445 ASSAY OF TSI GLOBULIN: CPT | Mod: 90

## 2021-08-15 PROCEDURE — 83520 IMMUNOASSAY QUANT NOS NONAB: CPT | Mod: 90

## 2021-08-15 PROCEDURE — 36415 COLL VENOUS BLD VENIPUNCTURE: CPT

## 2021-08-15 PROCEDURE — 84443 ASSAY THYROID STIM HORMONE: CPT

## 2021-08-15 PROCEDURE — 84480 ASSAY TRIIODOTHYRONINE (T3): CPT

## 2021-08-15 PROCEDURE — 84439 ASSAY OF FREE THYROXINE: CPT

## 2021-08-17 LAB — TSH RECEP AB SER-ACNC: 2.11 IU/L (ref 0–1.75)

## 2021-08-24 ENCOUNTER — MYC MEDICAL ADVICE (OUTPATIENT)
Dept: ENDOCRINOLOGY | Facility: CLINIC | Age: 21
End: 2021-08-24

## 2021-08-24 DIAGNOSIS — E05.00 GRAVES' DISEASE: Primary | ICD-10-CM

## 2021-08-26 LAB — TSI SER-ACNC: 1.2 TSI INDEX

## 2021-08-27 ENCOUNTER — OFFICE VISIT (OUTPATIENT)
Dept: FAMILY MEDICINE | Facility: CLINIC | Age: 21
End: 2021-08-27
Payer: MEDICAID

## 2021-08-27 VITALS
WEIGHT: 169 LBS | SYSTOLIC BLOOD PRESSURE: 116 MMHG | OXYGEN SATURATION: 95 % | BODY MASS INDEX: 28.47 KG/M2 | TEMPERATURE: 97.9 F | DIASTOLIC BLOOD PRESSURE: 70 MMHG | HEART RATE: 107 BPM | RESPIRATION RATE: 18 BRPM

## 2021-08-27 DIAGNOSIS — F41.1 GAD (GENERALIZED ANXIETY DISORDER): Primary | ICD-10-CM

## 2021-08-27 DIAGNOSIS — R10.12 ABDOMINAL PAIN, LEFT UPPER QUADRANT: ICD-10-CM

## 2021-08-27 PROCEDURE — 99213 OFFICE O/P EST LOW 20 MIN: CPT | Performed by: FAMILY MEDICINE

## 2021-08-27 ASSESSMENT — PAIN SCALES - GENERAL: PAINLEVEL: NO PAIN (0)

## 2021-08-27 NOTE — PROGRESS NOTES
Assessment & Plan       ICD-10-CM    1. VANNESSA (generalized anxiety disorder)  F41.1    2. Abdominal pain, left upper quadrant  R10.12       Maren is a 20 year old female with autism    VANNESSA - improved and stable since last visit. Sertraline was stopped and is taking Prozac 20 mg daily. Continue Prozac 20 mg daily. They continue to work on finding a therapist.    Left upper quadrant abdominal pain - unsure of etiology- constipation vs stomach vs musculoskeletal etiology. Unsure of last bowel movement. Has been taking Miralax daily and continues to strain to have BM. Increase Miralax to twice a day for a few days to soften stool and decrease effort to have BM. Minimize sugar intake and monitor if that helps alleviate pain. Monitor for potential injury or movements that increase pain. Update us on mychart if not improving       Return in about 3 months (around 11/27/2021).    Trent Coley MD  [facility]    Adalberto Engel is a 20 year old female who presents to clinic today for the following health issues     HPI       Anxiety Follow-Up    How are you doing with your anxiety since your last visit? Improved yes    Are you having other symptoms that might be associated with anxiety? Yes:  headaches     Have you had a significant life event? No     Are you feeling depressed? Yes:  yes    Do you have any concerns with your use of alcohol or other drugs? No    Social History     Tobacco Use     Smoking status: Never Smoker     Smokeless tobacco: Never Used   Substance Use Topics     Alcohol use: No     Alcohol/week: 0.0 standard drinks     Drug use: No     VANNESSA-7 SCORE 9/3/2020 11/17/2020 6/30/2021   Total Score - - -   Total Score 11 5 18     PHQ 12/3/2018 4/3/2019 8/19/2019   PHQ-9 Total Score 4 6 6   Q9: Thoughts of better off dead/self-harm past 2 weeks Not at all Not at all Not at all     Last PHQ-9 8/19/2019   1.  Little interest or pleasure in doing things 1   2.  Feeling down,  depressed, or hopeless 0   3.  Trouble falling or staying asleep, or sleeping too much 2   4.  Feeling tired or having little energy 2   5.  Poor appetite or overeating 0   6.  Feeling bad about yourself 0   7.  Trouble concentrating 1   8.  Moving slowly or restless 0   Q9: Thoughts of better off dead/self-harm past 2 weeks 0   PHQ-9 Total Score 6   Difficulty at work, home, or with people -     VANNESSA-7  6/30/2021   1. Feeling nervous, anxious, or on edge 3   2. Not being able to stop or control worrying 3   3. Worrying too much about different things 3   4. Trouble relaxing 3   5. Being so restless that it is hard to sit still 0   6. Becoming easily annoyed or irritable 3   7. Feeling afraid, as if something awful might happen 3   VANNESSA-7 Total Score 18   If you checked any problems, how difficult have they made it for you to do your work, take care of things at home, or get along with other people? Very difficult       Maren presents to the clinic with her mother. Since changing to Prozac from sertraline improved behaviors, less irritable, and better moods. Does reports sleeping a lot and napping.    Mom reports ongoing straining with bowel movements even with taking Miralax daily. Maren intermittently reports abdominal pain, nausea for the last few months. Mom correlates pain and nausea with increased sugar and sweets consumption.     Review of Systems   Constitutional, HEENT, cardiovascular, pulmonary, gi and gu systems are negative, except as otherwise noted.      Objective    /70   Pulse 107   Temp 97.9  F (36.6  C) (Temporal)   Resp 18   Wt 76.7 kg (169 lb)   SpO2 95%   BMI 28.47 kg/m       Physical Exam   GENERAL: healthy, alert and no distress  EYES: Eyes grossly normal to inspection, PERRL and conjunctivae and sclerae normal  HENT: ear canals and TM's normal, nose and mouth without ulcers or lesions  RESP: lungs clear to auscultation - no rales, rhonchi or wheezes  CV: regular rate and rhythm,  normal S1 S2, no S3 or S4, no murmur, click or rub, no peripheral edema and peripheral pulses strong  ABDOMEN: tenderness LUQ and bowel sounds normal, no distention, no rigidity, no gaurding  MS: no gross musculoskeletal defects noted, no edema  PSYCH: affect flat and judgement and insight limited           I am acting as a scribe for this visit. I have written the the note, but final editing is from Dr. Brijesh Alvarez, NP student    This patient was seen and examined by myself as well as the NP student.  The NP student scribed the note and I have reviewed it, edited it appropriately, and agree with the final documentation.     Trent Coley MD

## 2021-08-27 NOTE — TELEPHONE ENCOUNTER
Discussed with her mother.   TFT looks good, but TSI still higher side, we will continue methimazoel 2.5 mg daily for next 2 month.   Repeat lab end of 10/2021.    Lashonda williamson MD

## 2021-10-02 ENCOUNTER — HEALTH MAINTENANCE LETTER (OUTPATIENT)
Age: 21
End: 2021-10-02

## 2021-10-21 ENCOUNTER — LAB (OUTPATIENT)
Dept: LAB | Facility: CLINIC | Age: 21
End: 2021-10-21
Payer: MEDICAID

## 2021-10-21 DIAGNOSIS — E05.00 GRAVES' DISEASE: ICD-10-CM

## 2021-10-21 LAB
T3 SERPL-MCNC: 169 NG/DL (ref 60–181)
T4 FREE SERPL-MCNC: 1.04 NG/DL (ref 0.76–1.46)
TSH SERPL DL<=0.005 MIU/L-ACNC: 1.24 MU/L (ref 0.4–4)

## 2021-10-21 PROCEDURE — 84480 ASSAY TRIIODOTHYRONINE (T3): CPT

## 2021-10-21 PROCEDURE — 36415 COLL VENOUS BLD VENIPUNCTURE: CPT

## 2021-10-21 PROCEDURE — 84439 ASSAY OF FREE THYROXINE: CPT

## 2021-10-21 PROCEDURE — 84443 ASSAY THYROID STIM HORMONE: CPT

## 2021-11-01 ENCOUNTER — MYC MEDICAL ADVICE (OUTPATIENT)
Dept: FAMILY MEDICINE | Facility: CLINIC | Age: 21
End: 2021-11-01

## 2021-11-09 DIAGNOSIS — N92.0 MENORRHAGIA WITH REGULAR CYCLE: ICD-10-CM

## 2021-11-10 ENCOUNTER — OFFICE VISIT (OUTPATIENT)
Dept: FAMILY MEDICINE | Facility: CLINIC | Age: 21
End: 2021-11-10
Payer: MEDICAID

## 2021-11-10 ENCOUNTER — HOSPITAL ENCOUNTER (OUTPATIENT)
Dept: GENERAL RADIOLOGY | Facility: CLINIC | Age: 21
Discharge: HOME | End: 2021-11-10
Attending: FAMILY MEDICINE | Admitting: FAMILY MEDICINE
Payer: MEDICAID

## 2021-11-10 VITALS
HEART RATE: 110 BPM | DIASTOLIC BLOOD PRESSURE: 75 MMHG | BODY MASS INDEX: 28.14 KG/M2 | TEMPERATURE: 98.2 F | WEIGHT: 167 LBS | OXYGEN SATURATION: 95 % | SYSTOLIC BLOOD PRESSURE: 120 MMHG

## 2021-11-10 DIAGNOSIS — R10.32 LLQ ABDOMINAL PAIN: ICD-10-CM

## 2021-11-10 DIAGNOSIS — R10.32 LLQ ABDOMINAL PAIN: Primary | ICD-10-CM

## 2021-11-10 PROCEDURE — 74019 RADEX ABDOMEN 2 VIEWS: CPT

## 2021-11-10 PROCEDURE — 99213 OFFICE O/P EST LOW 20 MIN: CPT | Performed by: FAMILY MEDICINE

## 2021-11-10 RX ORDER — DESOGESTREL AND ETHINYL ESTRADIOL 0.15-0.03
KIT ORAL
Qty: 112 TABLET | Refills: 3 | Status: SHIPPED | OUTPATIENT
Start: 2021-11-10 | End: 2022-11-09

## 2021-11-10 ASSESSMENT — PAIN SCALES - GENERAL: PAINLEVEL: MODERATE PAIN (5)

## 2021-11-10 NOTE — TELEPHONE ENCOUNTER
Prescription approved per [facility] Refill Protocol.    Maryellen Enrique RN

## 2021-11-10 NOTE — PROGRESS NOTES
"  Assessment & Plan       ICD-10-CM    1. LLQ abdominal pain  R10.32 XR KUB     She has a long history of intermittent constipation.  I think this is bothering her again.  We will check an x-ray but did briefly discuss a bowel cleanout which I think would be necessary.           BMI:   Estimated body mass index is 28.14 kg/m  as calculated from the following:    Height as of 6/30/21: 1.641 m (5' 4.6\").    Weight as of this encounter: 75.8 kg (167 lb).           No follow-ups on file.    Trent Coley MD  [facility]    Adalberto Engel is a 21 year old who presents for the following health issues     HPI     Abdominal/Flank Pain  Onset/Duration: little over a month  Description:   Character: Cramping, hurt to touch  Location: left upper quadrant  Radiation: None  Intensity: moderate  Progression of Symptoms:  worsening  Accompanying Signs & Symptoms:  Fever/Chills: no  Gas/Bloating: no   Nausea: no  Vomitting: no  Diarrhea: no  Constipation: YES, hard time  Dysuria or Hematuria: YES  History:   Trauma: no  Previous similar pain: no  Previous tests done: none  Precipitating factors:   Does the pain change with:     Food: YES    Bowel Movement: YES    Urination: no   Other factors:  no  Therapies tried and outcome: None  No LMP recorded. (Menstrual status: Birth Control).          Review of Systems   Constitutional, HEENT, cardiovascular, pulmonary, gi and gu systems are negative, except as otherwise noted.      Objective    /75   Pulse 110   Temp 98.2  F (36.8  C) (Temporal)   Wt 75.8 kg (167 lb)   SpO2 95%   BMI 28.14 kg/m    Body mass index is 28.14 kg/m .  Physical Exam   Well-appearing.  Heart regular.  Lungs clear.  Abdomen soft nontender                "

## 2021-12-11 ASSESSMENT — ENCOUNTER SYMPTOMS
BREAST MASS: 0
PALPITATIONS: 0
CONSTIPATION: 1
NERVOUS/ANXIOUS: 0
HEMATOCHEZIA: 0
FEVER: 0
MYALGIAS: 0
ARTHRALGIAS: 0
PARESTHESIAS: 0
DIZZINESS: 0
CHILLS: 0
HEARTBURN: 0
WEAKNESS: 0
JOINT SWELLING: 0
ABDOMINAL PAIN: 1
HEADACHES: 1
COUGH: 0
SHORTNESS OF BREATH: 0
EYE PAIN: 0
FREQUENCY: 0
DIARRHEA: 0
NAUSEA: 0
DYSURIA: 0
SORE THROAT: 0
HEMATURIA: 0

## 2021-12-14 ENCOUNTER — OFFICE VISIT (OUTPATIENT)
Dept: FAMILY MEDICINE | Facility: CLINIC | Age: 21
End: 2021-12-14
Payer: MEDICAID

## 2021-12-14 VITALS
TEMPERATURE: 98.6 F | RESPIRATION RATE: 16 BRPM | BODY MASS INDEX: 28.37 KG/M2 | WEIGHT: 168.38 LBS | SYSTOLIC BLOOD PRESSURE: 110 MMHG | OXYGEN SATURATION: 97 % | HEART RATE: 97 BPM | DIASTOLIC BLOOD PRESSURE: 72 MMHG

## 2021-12-14 DIAGNOSIS — Z00.00 ANNUAL PHYSICAL EXAM: Primary | ICD-10-CM

## 2021-12-14 DIAGNOSIS — E05.00 GRAVES DISEASE: ICD-10-CM

## 2021-12-14 DIAGNOSIS — H57.13 PAIN OF BOTH EYES: ICD-10-CM

## 2021-12-14 DIAGNOSIS — F84.0 AUTISM SPECTRUM DISORDER: ICD-10-CM

## 2021-12-14 PROCEDURE — 99395 PREV VISIT EST AGE 18-39: CPT | Performed by: FAMILY MEDICINE

## 2021-12-14 PROCEDURE — 99213 OFFICE O/P EST LOW 20 MIN: CPT | Mod: 25 | Performed by: FAMILY MEDICINE

## 2021-12-14 ASSESSMENT — ENCOUNTER SYMPTOMS
WEAKNESS: 0
BREAST MASS: 0
JOINT SWELLING: 0
NERVOUS/ANXIOUS: 0
COUGH: 0
HEADACHES: 1
ABDOMINAL PAIN: 1
SHORTNESS OF BREATH: 0
FREQUENCY: 0
DIARRHEA: 0
ARTHRALGIAS: 0
NAUSEA: 0
DYSURIA: 0
HEMATOCHEZIA: 0
EYE PAIN: 0
PARESTHESIAS: 0
CHILLS: 0
PALPITATIONS: 0
FEVER: 0
SORE THROAT: 0
CONSTIPATION: 1
HEARTBURN: 0
HEMATURIA: 0
MYALGIAS: 0
DIZZINESS: 0

## 2021-12-14 NOTE — PROGRESS NOTES
SUBJECTIVE:   CC: Maren Winchester is an 21 year old woman who presents for preventive health visit.       Patient has been advised of split billing requirements and indicates understanding: Yes  Healthy Habits:     Getting at least 3 servings of Calcium per day:  Yes    Bi-annual eye exam:  Yes    Dental care twice a year:  Yes    Sleep apnea or symptoms of sleep apnea:  Daytime drowsiness    Diet:  Regular (no restrictions)    Frequency of exercise:  2-3 days/week    Duration of exercise:  Less than 15 minutes    Taking medications regularly:  Yes    PHQ-2 Total Score: 1    Additional concerns today:  Yes    Headache  Duration of complaint: Off and no for serveral months, headache has become daily for a few weeks.    Not sleeping well, ha's better when rested  Having trouble     Some stool issues again. Bowel clean out helped, but pain is back and stools back to being hard      Today's PHQ-2 Score:   PHQ-2 ( 1999 [employer]) 12/11/2021   Q1: Little interest or pleasure in doing things 1   Q2: Feeling down, depressed or hopeless 0   PHQ-2 Score 1   PHQ-2 Total Score (12-17 Years)- Positive if 3 or more points; Administer PHQ-A if positive -   Q1: Little interest or pleasure in doing things Several days   Q2: Feeling down, depressed or hopeless Not at all   PHQ-2 Score 1       Abuse: Current or Past (Physical, Sexual or Emotional) - No  Do you feel safe in your environment? Yes    Have you ever done Advance Care Planning? (For example, a Health Directive, POLST, or a discussion with a medical provider or your loved ones about your wishes): No, advance care planning information given to patient to review.  Patient plans to discuss their wishes with loved ones or provider.      Social History     Tobacco Use     Smoking status: Never Smoker     Smokeless tobacco: Never Used   Substance Use Topics     Alcohol use: No     Alcohol/week: 0.0 standard drinks     If you drink alcohol do you typically have >3 drinks per day  or >7 drinks per week? No    No flowsheet data found.    Reviewed orders with patient.  Reviewed health maintenance and updated orders accordingly -       Breast Cancer Screening:        History of abnormal Pap smear:      Reviewed and updated as needed this visit by clinical staff  Tobacco  Allergies  Meds   Med Hx  Surg Hx  Fam Hx  Soc Hx       Reviewed and updated as needed this visit by Provider                   Review of Systems   Constitutional: Negative for chills and fever.   HENT: Negative for congestion, ear pain, hearing loss and sore throat.    Eyes: Positive for visual disturbance. Negative for pain.   Respiratory: Negative for cough and shortness of breath.    Cardiovascular: Negative for chest pain, palpitations and peripheral edema.   Gastrointestinal: Positive for abdominal pain and constipation. Negative for diarrhea, heartburn, hematochezia and nausea.   Breasts:  Negative for tenderness, breast mass and discharge.   Genitourinary: Negative for dysuria, frequency, genital sores, hematuria, pelvic pain, urgency, vaginal bleeding and vaginal discharge.   Musculoskeletal: Negative for arthralgias, joint swelling and myalgias.   Skin: Negative for rash.   Neurological: Positive for headaches. Negative for dizziness, weakness and paresthesias.   Psychiatric/Behavioral: Negative for mood changes. The patient is not nervous/anxious.           OBJECTIVE:   /72 (BP Location: Right arm, Patient Position: Sitting, Cuff Size: Adult Regular)   Pulse 97   Temp 98.6  F (37  C) (Temporal)   Resp 16   Wt 76.4 kg (168 lb 6 oz)   SpO2 97%   BMI 28.37 kg/m    Physical Exam  GENERAL: healthy, alert and no distress  NECK: no adenopathy, no asymmetry, masses, or scars and thyroid normal to palpation  RESP: lungs clear to auscultation - no rales, rhonchi or wheezes  CV: regular rate and rhythm, normal S1 S2, no S3 or S4, no murmur, click or rub, no peripheral edema and peripheral pulses  "strong  ABDOMEN: soft, nontender, no hepatosplenomegaly, no masses and bowel sounds normal  MS: no gross musculoskeletal defects noted, no edema    Diagnostic Test Results:  Labs reviewed in Epic    ASSESSMENT/PLAN:       ICD-10-CM    1. Annual physical exam  Z00.00    2. Pain of both eyes  H57.13 Adult Eye Referral   3. Graves disease  E05.00 Adult Eye Referral   4. Autism spectrum disorder  F84.0      Returns for physical.  Doing great.  Mom present here today.  Complains of sounds like tension style headache.  They will work on increasing her rest, considering chiropractic care.  May consider physical therapy in the future.  Continue with ibuprofen and Tylenol for pain relief.  She continues to have pain in both her eyes.  She has a history of Graves' disease that is treated through endocrinology.  I asked her to see ophthalmology as well.  No exophthalmos on exam.  Her abdominal pain I think continues to be her constipation related.  Mom will go back increasing her MiraLAX until we can find a dose that works to keep her regular.    Patient has been advised of split billing requirements and indicates understanding: Yes  COUNSELING:  Reviewed preventive health counseling, as reflected in patient instructions    Estimated body mass index is 28.37 kg/m  as calculated from the following:    Height as of 6/30/21: 1.641 m (5' 4.6\").    Weight as of this encounter: 76.4 kg (168 lb 6 oz).        She reports that she has never smoked. She has never used smokeless tobacco.      Counseling Resources:  ATP IV Guidelines  Pooled Cohorts Equation Calculator  Breast Cancer Risk Calculator  BRCA-Related Cancer Risk Assessment: FHS-7 Tool  FRAX Risk Assessment  ICSI Preventive Guidelines  Dietary Guidelines for Americans, 2010  USDA's MyPlate  ASA Prophylaxis  Lung CA Screening    Trent Coley MD  [facility]  "

## 2021-12-20 DIAGNOSIS — F41.1 GAD (GENERALIZED ANXIETY DISORDER): ICD-10-CM

## 2021-12-23 ENCOUNTER — ALLIED HEALTH/NURSE VISIT (OUTPATIENT)
Dept: FAMILY MEDICINE | Facility: CLINIC | Age: 21
End: 2021-12-23
Payer: MEDICAID

## 2021-12-23 DIAGNOSIS — Z23 HIGH PRIORITY FOR 2019-NCOV VACCINE: Primary | ICD-10-CM

## 2021-12-23 PROCEDURE — 0064A COVID-19,PF,MODERNA (18+ YRS BOOSTER .25ML): CPT

## 2021-12-23 PROCEDURE — 99207 PR NO CHARGE NURSE ONLY: CPT

## 2021-12-23 PROCEDURE — 91306 COVID-19,PF,MODERNA (18+ YRS BOOSTER .25ML): CPT

## 2022-04-28 ENCOUNTER — TELEPHONE (OUTPATIENT)
Dept: ENDOCRINOLOGY | Facility: CLINIC | Age: 22
End: 2022-04-28
Payer: MEDICAID

## 2022-04-28 NOTE — TELEPHONE ENCOUNTER
[facility] Call Center    Phone Message    May a detailed message be left on voicemail: yes     Reason for Call: Other: Pt's mother Marine called requesting call back to discuss an appt for June with Dr. Vallecillo. Marine stated the pt needs to be seen every 6 months f     Action Taken: Message routed to:  Other: endo

## 2022-04-29 NOTE — TELEPHONE ENCOUNTER
Attempted to return call to Marine, No answer. Left message to return the call.    Taylor Wall CMA  Adult Endocrinology  [facility] [place]

## 2022-04-29 NOTE — TELEPHONE ENCOUNTER
Marine called back and is scheduled for virtual on 6/17/2022 and then in Dec the patient will be in person.    Taylor Wall CMA  Adult Endocrinology  [facility], [place]

## 2022-06-13 ENCOUNTER — TELEPHONE (OUTPATIENT)
Dept: ENDOCRINOLOGY | Facility: CLINIC | Age: 22
End: 2022-06-13
Payer: MEDICAID

## 2022-06-13 ENCOUNTER — LAB (OUTPATIENT)
Dept: LAB | Facility: CLINIC | Age: 22
End: 2022-06-13
Payer: MEDICAID

## 2022-06-13 DIAGNOSIS — E05.00 GRAVES' DISEASE: ICD-10-CM

## 2022-06-13 DIAGNOSIS — E05.00 GRAVES DISEASE: Primary | ICD-10-CM

## 2022-06-13 DIAGNOSIS — E05.00 GRAVES DISEASE: ICD-10-CM

## 2022-06-13 DIAGNOSIS — E05.90 HYPERTHYROIDISM: Primary | ICD-10-CM

## 2022-06-13 LAB
HOLD SPECIMEN: NORMAL
HOLD SPECIMEN: NORMAL
T4 FREE SERPL-MCNC: 1.04 NG/DL (ref 0.76–1.46)
TSH SERPL DL<=0.005 MIU/L-ACNC: 1.13 MU/L (ref 0.4–4)

## 2022-06-13 PROCEDURE — 84480 ASSAY TRIIODOTHYRONINE (T3): CPT

## 2022-06-13 PROCEDURE — 36415 COLL VENOUS BLD VENIPUNCTURE: CPT

## 2022-06-13 PROCEDURE — 84443 ASSAY THYROID STIM HORMONE: CPT

## 2022-06-13 PROCEDURE — 84439 ASSAY OF FREE THYROXINE: CPT

## 2022-06-13 NOTE — TELEPHONE ENCOUNTER
Orders placed by Dr Vallecillo and writer has notified [place] lab dept.    Sonja Naik CMA  Adult Endocrinology  [facility]

## 2022-06-13 NOTE — TELEPHONE ENCOUNTER
[facility] Call Center    Phone Message    May a detailed message be left on voicemail: yes     Reason for Call: Order(s): Other:   Reason for requested: Per caller Patient has 9:30am lab appointment Lab just called to inform them they  Need the  thyroid lab orders asap.   Date needed: 6/13/2022  Provider name: Dr. Vallecillo       Action Taken: Other: ENDO    Travel Screening: Not Applicable

## 2022-06-14 LAB — T3 SERPL-MCNC: 176 NG/DL (ref 60–181)

## 2022-06-17 ENCOUNTER — VIRTUAL VISIT (OUTPATIENT)
Dept: ENDOCRINOLOGY | Facility: CLINIC | Age: 22
End: 2022-06-17
Payer: MEDICAID

## 2022-06-17 DIAGNOSIS — E05.90 HYPERTHYROIDISM: ICD-10-CM

## 2022-06-17 DIAGNOSIS — E05.00 GRAVES' DISEASE: Primary | ICD-10-CM

## 2022-06-17 PROCEDURE — 99214 OFFICE O/P EST MOD 30 MIN: CPT | Mod: 95 | Performed by: INTERNAL MEDICINE

## 2022-06-17 NOTE — PROGRESS NOTES
Maren Winchester  is being evaluated via a billable video visit.      How would you like to obtain your AVS? [employer]  For the video visit, send the invitation by: Send to e-mail at: [email or web address]  Will anyone else be joining your video visit? No     Platform used for Video Visit: [email or web address]       Video-Visit Details    Type of service:  Video Visit    Start: 3:07 pm  End: 3:20 pm    Originating Location (pt. Location): Home    Distant Location (provider location):  [facility]     Platform used for Video Visit: [facility]                                                                               - Endocrinology follow up -    Reason for visit/consult: Hyperthyroidism, Graves disease    Primary care provider: Leidy Carrizales      Assessment and Plan  21 year old female with hyperthyroidism    # Grave' disease  TSI, TSH receptor Ab positive. Diagnosed Graves disease in August 2019. Taking methimazole 1 year and now (current dose methimazole 5 mg)  euthryoid. Rather some symptoms of hypothyroidism (constipation, weight gain 15 lb over 6 month, fatigue), we will reduce the dose  , then currenlty half pill Mon/Wed/Fri for 1 year (since 7/2020) and started to have some palpitation and eye puffiness. Currently past 6 month, methimazole 2.65 mg daily and euthryoid doing well.     - TSH, free T4, total T3 prior to next appt    - TSI, TSH receptor Ab prior to next appt       # menstrual cycle  On BCP every 3 month       Follow up with me in 6 month       Total 30 minutes spent on the date of the encounter doing chart review, history and exam, documentation and further activities as noted above.    Lashonda Vallecillo MD  Staff Physician  Endocrinology and Metabolism  [facility]  License: [id number]  Pager: [phone]    Interval History as of 6/17/2022 : Patient has been doing well. Medication compliance: excellent   . New event includes: no pertinent medical event noted  .  Interval History as  of 7/16/2021 : Patient has been doing well, but slight puffy eye. Occasional palpitation? Medication compliance excellent  On methimazole 2.5 mg Mon/Wed/Fri for 1 year.  Interval History as of 1/15/2021 : Patient has been doing well. Last seen 6 month ago . Medication compliance excellent  . 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  Interval History as of 7/24/2020 : Patient has been doing well. Last seen 7 month ago . Medication compliance excellent. Taking methimazole 1 year and now (current dose methimazole 5 mg)  euthryoid. Rather some symptoms of hypothyroidism (constipation, weight gain, fatigue), we will reduce the dose.  Interval History as of 1/24/2020 : Patient has been doing well.  Medication compliance excellent  . New event includes : occasional HA, no N/V. No palpitation. eue symptoms getting better. Enjoying working [employer] and [employer].   Interval History as of 10/18/2019 Diagnosed in August 2019 currently she is on methimazole 5 mg and propranolol 2 0 mg twice daily.  Her parents are taking care of her medication and that she is compliant.  She still mentioned fatigue, insomnia, however by examining her today her tachycardia resolved and she had a slight swelling to the size of goiter.  TSH is still suppressed however free T4 became normalized.   HPI: A 19 yo female here for the consultation of her hyperthyroidism.  Patient came with her parents today.     Patient mentioned for the past 6 months she started to have mild dysphagia when she eats breakfast and some sore throat.  Also excessive fatigue, insomnia.  She also started to have eye itchiness past several months, she went to ophthalmologist her vision was okay.   She gained 5 pounds since April 2019 she is hungry was that time.   She usually has constipation using the laxative however she started to have some diarrhea.   Her mood tends to be more depressed recently and also increase anxiety level according to the parents.    Patient also has family history of thyroid condition in her maternal side.  Mother : hemithyroidectomy. Maternal aunt: goiter  maternal grandmother: DM    Past Medical/Surgical History:  Past Medical History:   Diagnosis Date     ADHD (attention deficit hyperactivity disorder)     Dupree diagnosis     Autism     autism spectrum disorder     Mental retardation     Dupree diagnosis     No past surgical history on file.    Allergies:  No Known Allergies    Current Medications   Current Outpatient Medications   Medication     cholecalciferol (VITAMIN D3) 25 mcg (1000 units) capsule     FLUoxetine (PROZAC) 20 MG capsule     JULEBER 0.15-30 MG-MCG tablet     methimazole (TAPAZOLE) 5 MG tablet     Multiple Vitamins-Minerals (MULTIVITAL PO)     polyethylene glycol (MIRALAX) 17 GM/Dose powder     vitamin C (ASCORBIC ACID) 500 MG tablet     No current facility-administered medications for this visit.       Family History:  Family History   Problem Relation Age of Onset     Asthma No family hx of      Cerebrovascular Disease No family hx of      Depression No family hx of      Obesity No family hx of    Mother : hemithyroidectomy. Maternal aunt: goiter  maternal grandmother: DM    Social History:  Social History     Tobacco Use     Smoking status: Never Smoker     Smokeless tobacco: Never Used   Substance Use Topics     Alcohol use: No     Alcohol/week: 0.0 standard drinks   Lives with parents, 1 brother and 1 sister  Sister: seizure disorder,   Brother: healthy  Transition program, [employer] store, cafeteria,     ROS:  Full review of systems taken with the help of the intake sheet. Otherwise a complete 14 point review of systems was taken and is negative unless stated in the history above.      Physical Exam:   There were no vitals taken for this visit.     General: well appearing, no acute distress, pleasant and conversant,   Mental Status/neuro: alert and oriented  Face: symmetrical, normal facial color  Eyes: anicteric, no  proptosis or lid lag, slight pink and puffy  Neck: suppler, no lymphadenopahty  Resp: no acute distress  Hands: No tremor bilateral      Labs : I reviewed data from epic and extract and summarize the pertinent data here.      8/30/2019 12:21   Thyrotropin Receptor Antibody 5.44 (H)      Ref. Range 8/30/2019 12:21   Thyroid Stim Immunog Latest Ref Range: <=1.3 TSI index 1.9 (H)        10/11/2019 15:06   T4 Free 1.41   Triiodothyronine (T3) 234 (H)   TSH <0.01 (L)        Ref. Range 1/7/2016 14:21 1/24/2017 08:15 4/3/2019 16:42 8/22/2019 15:07 8/27/2019 07:46   T4 Free Latest Ref Range: 0.76 - 1.46 ng/dL 1.10 1.29 1.13 1.85 (H) 1.77 (H)   TSH Latest Ref Range: 0.40 - 4.00 mU/L 1.13 0.81 0.13 (L) <0.01 (L) <0.01 (L)   Free T3 Latest Ref Range: 2.3 - 4.2 pg/mL    6.3 (H)    Thyroglobulin Antibody Latest Ref Range: <40 IU/mL     <20   Thyroid Peroxidase Antibody Latest Ref Range: <35 IU/mL     101 (H)     Lab Results   Component Value Date     08/27/2019      Lab Results   Component Value Date    POTASSIUM 4.0 08/27/2019     Lab Results   Component Value Date    CHLORIDE 107 08/27/2019     Lab Results   Component Value Date    MAIA 8.7 08/27/2019     Lab Results   Component Value Date    CO2 23 08/27/2019     Lab Results   Component Value Date    BUN 11 08/27/2019     Lab Results   Component Value Date    CR 0.45 08/27/2019     Lab Results   Component Value Date    GLC 86 08/27/2019     Lab Results   Component Value Date    TSH <0.01 08/27/2019     Lab Results   Component Value Date    T4 1.77 08/27/2019     Lab Results   Component Value Date    A1C 5.6 03/23/2015

## 2022-06-17 NOTE — LETTER
6/17/2022         RE: Maren Winchester  [address] [facility] [address]        Dear Colleague,    Thank you for referring your patient, Maren Winchester, to the [facility]. Please see a copy of my visit note below.    Maren Winchester  is being evaluated via a billable video visit.      How would you like to obtain your AVS? MyChart  For the video visit, send the invitation by: Send to e-mail at: [email or web address]  Will anyone else be joining your video visit? No     Platform used for Video Visit: [employer]       Video-Visit Details    Type of service:  Video Visit    Start: 3:07 pm  End: 3:20 pm    Originating Location (pt. Location): Home    Distant Location (provider location):  [facility]     Platform used for Video Visit: Well                                                                               - Endocrinology follow up -    Reason for visit/consult: Hyperthyroidism, Graves disease    Primary care provider: Leidy Carrizales      Assessment and Plan  21 year old female with hyperthyroidism    # Grave' disease  TSI, TSH receptor Ab positive. Diagnosed Graves disease in August 2019. Taking methimazole 1 year and now (current dose methimazole 5 mg)  euthryoid. Rather some symptoms of hypothyroidism (constipation, weight gain 15 lb over 6 month, fatigue), we will reduce the dose  , then currenlty half pill Mon/Wed/Fri for 1 year (since 7/2020) and started to have some palpitation and eye puffiness. Currently past 6 month, methimazole 2.65 mg daily and euthryoid doing well.     - TSH, free T4, total T3 prior to next appt    - TSI, TSH receptor Ab prior to next appt       # menstrual cycle  On BCP every 3 month       Follow up with me in 6 month       Total 30 minutes spent on the date of the encounter doing chart review, history and exam, documentation and further activities as noted above.    Lashonda Vallecillo MD  Staff Physician  Endocrinology and  Metabolism  [facility]  License: [id number]  Pager: [phone]    Interval History as of 6/17/2022 : Patient has been doing well. Medication compliance: excellent   . New event includes: no pertinent medical event noted  .  Interval History as of 7/16/2021 : Patient has been doing well, but slight puffy eye. Occasional palpitation? Medication compliance excellent  On methimazole 2.5 mg Mon/Wed/Fri for 1 year.  Interval History as of 1/15/2021 : Patient has been doing well. Last seen 6 month ago . Medication compliance excellent  . 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  Interval History as of 7/24/2020 : Patient has been doing well. Last seen 7 month ago . Medication compliance excellent. Taking methimazole 1 year and now (current dose methimazole 5 mg)  euthryoid. Rather some symptoms of hypothyroidism (constipation, weight gain, fatigue), we will reduce the dose.  Interval History as of 1/24/2020 : Patient has been doing well.  Medication compliance excellent  . New event includes : occasional HA, no N/V. No palpitation. eue symptoms getting better. Enjoying working [email or web address] and [employer].   Interval History as of 10/18/2019 Diagnosed in August 2019 currently she is on methimazole 5 mg and propranolol 2 0 mg twice daily.  Her parents are taking care of her medication and that she is compliant.  She still mentioned fatigue, insomnia, however by examining her today her tachycardia resolved and she had a slight swelling to the size of goiter.  TSH is still suppressed however free T4 became normalized.   HPI: A 19 yo female here for the consultation of her hyperthyroidism.  Patient came with her parents today.     Patient mentioned for the past 6 months she started to have mild dysphagia when she eats breakfast and some sore throat.  Also excessive fatigue, insomnia.  She also started to have eye itchiness past several months, she went to ophthalmologist her vision was okay.   She  gained 5 pounds since April 2019 she is hungry was that time.   She usually has constipation using the laxative however she started to have some diarrhea.   Her mood tends to be more depressed recently and also increase anxiety level according to the parents.   Patient also has family history of thyroid condition in her maternal side.  Mother : hemithyroidectomy. Maternal aunt: goiter  maternal grandmother: DM    Past Medical/Surgical History:  Past Medical History:   Diagnosis Date     ADHD (attention deficit hyperactivity disorder)     Altheimer diagnosis     Autism     autism spectrum disorder     Mental retardation     Altheimer diagnosis     No past surgical history on file.    Allergies:  No Known Allergies    Current Medications   Current Outpatient Medications   Medication     cholecalciferol (VITAMIN D3) 25 mcg (1000 units) capsule     FLUoxetine (PROZAC) 20 MG capsule     JULEBER 0.15-30 MG-MCG tablet     methimazole (TAPAZOLE) 5 MG tablet     Multiple Vitamins-Minerals (MULTIVITAL PO)     polyethylene glycol (MIRALAX) 17 GM/Dose powder     vitamin C (ASCORBIC ACID) 500 MG tablet     No current facility-administered medications for this visit.       Family History:  Family History   Problem Relation Age of Onset     Asthma No family hx of      Cerebrovascular Disease No family hx of      Depression No family hx of      Obesity No family hx of    Mother : hemithyroidectomy. Maternal aunt: goiter  maternal grandmother: DM    Social History:  Social History     Tobacco Use     Smoking status: Never Smoker     Smokeless tobacco: Never Used   Substance Use Topics     Alcohol use: No     Alcohol/week: 0.0 standard drinks   Lives with parents, 1 brother and 1 sister  Sister: seizure disorder,   Brother: healthy  Transition program, [employer] store, cafeteria,     ROS:  Full review of systems taken with the help of the intake sheet. Otherwise a complete 14 point review of systems was taken and is negative unless stated in the  history above.      Physical Exam:   There were no vitals taken for this visit.     General: well appearing, no acute distress, pleasant and conversant,   Mental Status/neuro: alert and oriented  Face: symmetrical, normal facial color  Eyes: anicteric, no proptosis or lid lag, slight pink and puffy  Neck: suppler, no lymphadenopahty  Resp: no acute distress  Hands: No tremor bilateral      Labs : I reviewed data from epic and extract and summarize the pertinent data here.      8/30/2019 12:21   Thyrotropin Receptor Antibody 5.44 (H)      Ref. Range 8/30/2019 12:21   Thyroid Stim Immunog Latest Ref Range: <=1.3 TSI index 1.9 (H)        10/11/2019 15:06   T4 Free 1.41   Triiodothyronine (T3) 234 (H)   TSH <0.01 (L)        Ref. Range 1/7/2016 14:21 1/24/2017 08:15 4/3/2019 16:42 8/22/2019 15:07 8/27/2019 07:46   T4 Free Latest Ref Range: 0.76 - 1.46 ng/dL 1.10 1.29 1.13 1.85 (H) 1.77 (H)   TSH Latest Ref Range: 0.40 - 4.00 mU/L 1.13 0.81 0.13 (L) <0.01 (L) <0.01 (L)   Free T3 Latest Ref Range: 2.3 - 4.2 pg/mL    6.3 (H)    Thyroglobulin Antibody Latest Ref Range: <40 IU/mL     <20   Thyroid Peroxidase Antibody Latest Ref Range: <35 IU/mL     101 (H)     Lab Results   Component Value Date     08/27/2019      Lab Results   Component Value Date    POTASSIUM 4.0 08/27/2019     Lab Results   Component Value Date    CHLORIDE 107 08/27/2019     Lab Results   Component Value Date    MAIA 8.7 08/27/2019     Lab Results   Component Value Date    CO2 23 08/27/2019     Lab Results   Component Value Date    BUN 11 08/27/2019     Lab Results   Component Value Date    CR 0.45 08/27/2019     Lab Results   Component Value Date    GLC 86 08/27/2019     Lab Results   Component Value Date    TSH <0.01 08/27/2019     Lab Results   Component Value Date    T4 1.77 08/27/2019     Lab Results   Component Value Date    A1C 5.6 03/23/2015                         Again, thank you for allowing me to participate in the care of your patient.         Sincerely,        Lashonda Vallecillo MD

## 2022-06-19 DIAGNOSIS — F41.1 GAD (GENERALIZED ANXIETY DISORDER): ICD-10-CM

## 2022-06-23 NOTE — PROGRESS NOTES
Attempted to reach patient for: Follow up call. Outcome: Left voicemail with RNCC's name and contact information. Next Follow Up: Close [facility] encounter in one week if no callback received from patient.      CASEY Frazier, RN  Nurse Care Manager  Phone: [phone] SUBJECTIVE:                                                      HPI:  Maren is a 18 year old female with Autism Spectrum Disorder (ASD) with who presents to clinic today for follow-up of Generalized Anxiety Disorder (VANNESSA). Last visit: 7/16/18.     Overall, mom feels that Maren has not been doing as well. Anxiety is up. It has been a difficult with transition from High School friends and comfortable environment. Maren feels that she has been more tired. She states that she is not sleeping as well because dreams and nightmares. She is starting to take naps.     Current behavioral therapy: previously with Freddy with [facility] until her left [place].   Current coping strategies: coloring, beading, painting, dancing in the house, riding bikes, walks/running when weather is good. She is no longer getting regular exercise with cold weather and with graduation for High School and [employer]. Weight is up 5 lbs in 5 month(s).   Work: Maren continues working at [employer]. 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 Will start volunteering at [facility].     Medication: Prozac (fluoxetine) 30 mg  Date medication first prescribed: 6/9/15. Tried weaning Prozac (fluoxetine)  10 mg over a month during the summer. Restarted due to moodiness and irritability. She was easily upset over little things. Restarted 10 mg, increased to 30 mg with good symptom control.  Medication problems/concerns: none    ROS: Negative for constitutional, eye, ear, nose, throat, skin, respiratory, cardiac, and gastrointestinal other than those outlined in the HPI.    PROBLEM LIST:  Patient Active Problem List    Diagnosis Date Noted     Autism spectrum disorder 01/16/2018     Priority: Medium     Chronic rhinitis 11/12/2016     Priority: Medium     VANNESSA (generalized anxiety disorder) 07/09/2016     Priority: Medium     Constipation, unspecified constipation type 12/10/2015     Priority: Medium     Gross  "motor delay 04/06/2015     Priority: Medium     Cognitive impairment 07/06/2012     Priority: Medium     Menorrhagia 07/06/2012     Priority: Medium      MEDICATIONS:  Current Outpatient Prescriptions   Medication Sig Dispense Refill     desogestrel-ethinyl estradiol (APRI) 0.15-30 MG-MCG per tablet Take 1 tablet by mouth daily Skip placebo week except for every 3rd month       docusate sodium (COLACE) 100 MG tablet Take 100 mg by mouth daily       FLUoxetine (PROZAC) 20 MG tab Take 1.5 tabs daily       fluticasone (FLONASE) 50 MCG/ACT spray Spray 1-2 sprays into both nostrils daily       Multiple Vitamins-Minerals (MULTIVITAL PO) Take  by mouth.       polyethylene glycol (MIRALAX) powder Take 17 g (1 capful) by mouth daily        MG capsule         ALLERGIES:  No Known Allergies    Problem list and histories reviewed & adjusted, as indicated.    OBJECTIVE:                                                      /60  Pulse 74  Temp 98.1  F (36.7  C) (Temporal)  Resp 20  Ht 5' 4.57\" (1.64 m)  Wt 147 lb 8 oz (66.9 kg)  BMI 24.88 kg/m2  Physical Exam:  Appearance: in no apparent distress, well developed and well nourished, alert.  Heart: S1, S2 normal, no murmur, no gallop, rate regular.  Lungs: no retractions, clear to auscultation.   Skin: No cutting or lesions.    VANNESSA-7 SCORE 2/25/2018 7/15/2018 12/3/2018   Total Score - 5 (mild anxiety) 11 (moderate anxiety)   Total Score 12 5 11       PHQ-9 SCORE 1/15/2018 2/25/2018 12/3/2018   PHQ-9 Total Score MyChart 0 - 4 (Minimal depression)   PHQ-9 Total Score 0 0 4       ASSESSMENT/PLAN:     Autism Spectrum Disorder (ASD)--  Generalized Anxiety Disorder (VANNESSA)--  Cognitive Impairment--    Recommend restarting therapy for skill building and emotion regulation skills with [facility] or other facility.  Will increase Prozac (fluoxetine) from 30 to 40 mg.    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  Reviewed importance of coping skills. Maren is able to name several adaptive coping skills including exercise, beading, coloring, witting in a journal, and listening to music.    Strongly encourage aerobic exercise. Family to purchase a treadmill to use 5 days weekly for 1 hour(s).   Recheck in clinic in 2 months, sooner with concerns.   Recommend good sleep hygiene including no napping and regular bedtime.      Patient's parent expresses understanding and agreement with the plan and has no further questions.     Electronically signed by Leidy Carrizales MD.

## 2022-08-07 DIAGNOSIS — E05.90 HYPERTHYROIDISM: ICD-10-CM

## 2022-08-07 DIAGNOSIS — E05.00 GRAVES' DISEASE: ICD-10-CM

## 2022-08-11 NOTE — TELEPHONE ENCOUNTER
METHIMAZOLE 5MG TABS  Last Written Prescription Date:   7/16/2021  Last Fill Quantity: 45,   # refills: 3  Last Office Visit : 6/17/2022  Future Office visit:   12/9/2022    Routing refill request to provider for review/approval because:  Drug not on the [facility], P or [facility] refill protocol or controlled substance      Saige Mendoza RN  Central Triage Red Flags/Med Refills

## 2022-08-12 RX ORDER — METHIMAZOLE 5 MG/1
2.5 TABLET ORAL DAILY
Qty: 45 TABLET | Refills: 3 | Status: SHIPPED | OUTPATIENT
Start: 2022-08-12 | End: 2023-08-11

## 2022-09-03 ENCOUNTER — HEALTH MAINTENANCE LETTER (OUTPATIENT)
Age: 22
End: 2022-09-03

## 2022-09-29 ENCOUNTER — DOCUMENTATION ONLY (OUTPATIENT)
Dept: OTHER | Facility: CLINIC | Age: 22
End: 2022-09-29

## 2022-11-09 ENCOUNTER — MYC MEDICAL ADVICE (OUTPATIENT)
Dept: FAMILY MEDICINE | Facility: CLINIC | Age: 22
End: 2022-11-09

## 2022-11-09 DIAGNOSIS — N92.0 MENORRHAGIA WITH REGULAR CYCLE: ICD-10-CM

## 2022-11-09 RX ORDER — DESOGESTREL AND ETHINYL ESTRADIOL 0.15-0.03
KIT ORAL
Qty: 112 TABLET | Refills: 0 | Status: SHIPPED | OUTPATIENT
Start: 2022-11-09 | End: 2023-01-11

## 2022-11-09 ASSESSMENT — ENCOUNTER SYMPTOMS
HEMATOCHEZIA: 0
PARESTHESIAS: 0
SORE THROAT: 0
WEAKNESS: 0
HEARTBURN: 0
DIARRHEA: 0
MYALGIAS: 0
BREAST MASS: 0
SHORTNESS OF BREATH: 0
HEMATURIA: 0
CHILLS: 0
DIZZINESS: 0
CONSTIPATION: 0
FEVER: 0
FREQUENCY: 0
NAUSEA: 0
DYSURIA: 0
COUGH: 0
JOINT SWELLING: 0
ABDOMINAL PAIN: 0
PALPITATIONS: 0
HEADACHES: 1
EYE PAIN: 0
ARTHRALGIAS: 0
NERVOUS/ANXIOUS: 0

## 2022-11-09 NOTE — TELEPHONE ENCOUNTER
Refill approved. Will need an appointment . Last appointment 12/2021.     Yandel Langley, RN, BSN

## 2022-11-11 ENCOUNTER — OFFICE VISIT (OUTPATIENT)
Dept: FAMILY MEDICINE | Facility: CLINIC | Age: 22
End: 2022-11-11
Payer: MEDICAID

## 2022-11-11 VITALS
BODY MASS INDEX: 28.32 KG/M2 | SYSTOLIC BLOOD PRESSURE: 116 MMHG | HEART RATE: 94 BPM | HEIGHT: 65 IN | WEIGHT: 170 LBS | DIASTOLIC BLOOD PRESSURE: 72 MMHG | OXYGEN SATURATION: 97 % | TEMPERATURE: 97.9 F

## 2022-11-11 DIAGNOSIS — F84.0 AUTISM SPECTRUM DISORDER: ICD-10-CM

## 2022-11-11 DIAGNOSIS — Z00.00 ANNUAL PHYSICAL EXAM: Primary | ICD-10-CM

## 2022-11-11 PROCEDURE — 90471 IMMUNIZATION ADMIN: CPT | Performed by: FAMILY MEDICINE

## 2022-11-11 PROCEDURE — 90686 IIV4 VACC NO PRSV 0.5 ML IM: CPT | Performed by: FAMILY MEDICINE

## 2022-11-11 PROCEDURE — 90472 IMMUNIZATION ADMIN EACH ADD: CPT | Performed by: FAMILY MEDICINE

## 2022-11-11 PROCEDURE — 99395 PREV VISIT EST AGE 18-39: CPT | Mod: 25 | Performed by: FAMILY MEDICINE

## 2022-11-11 PROCEDURE — 90715 TDAP VACCINE 7 YRS/> IM: CPT | Performed by: FAMILY MEDICINE

## 2022-11-11 ASSESSMENT — ENCOUNTER SYMPTOMS
EYE PAIN: 0
CHILLS: 0
NERVOUS/ANXIOUS: 0
CONSTIPATION: 0
HEMATURIA: 0
SORE THROAT: 0
DYSURIA: 0
PALPITATIONS: 0
NAUSEA: 0
HEADACHES: 1
COUGH: 0
HEMATOCHEZIA: 0
JOINT SWELLING: 0
BREAST MASS: 0
FEVER: 0
DIARRHEA: 0
HEARTBURN: 0
FREQUENCY: 0
ABDOMINAL PAIN: 0
ARTHRALGIAS: 0
MYALGIAS: 0
DIZZINESS: 0
PARESTHESIAS: 0
WEAKNESS: 0
SHORTNESS OF BREATH: 0

## 2022-11-11 ASSESSMENT — PAIN SCALES - GENERAL: PAINLEVEL: EXTREME PAIN (8)

## 2022-11-11 NOTE — PROGRESS NOTES
SUBJECTIVE:   CC: Maren is an 22 year old who presents for preventive health visit.     prozac going well, feels stable  Having HA 1-2 x per week ? Related to drawing and concentration and screen time  ? Tired during, sleep habits       Healthy Habits:     Getting at least 3 servings of Calcium per day:  Yes    Bi-annual eye exam:  Yes    Dental care twice a year:  Yes    Sleep apnea or symptoms of sleep apnea:  Daytime drowsiness    Diet:  Regular (no restrictions)    Frequency of exercise:  2-3 days/week    Duration of exercise:  15-30 minutes    Taking medications regularly:  Yes    Medication side effects:  None    PHQ-2 Total Score: 0    Additional concerns today:  Yes      Today's PHQ-2 Score:   PHQ-2 ( 1999 Pfizer) 11/9/2022   Q1: Little interest or pleasure in doing things 0   Q2: Feeling down, depressed or hopeless 0   PHQ-2 Score 0   PHQ-2 Total Score (12-17 Years)- Positive if 3 or more points; Administer PHQ-A if positive -   Q1: Little interest or pleasure in doing things Not at all   Q2: Feeling down, depressed or hopeless Not at all   PHQ-2 Score 0         Social History     Tobacco Use     Smoking status: Never     Smokeless tobacco: Never   Substance Use Topics     Alcohol use: No     Alcohol/week: 0.0 standard drinks         Alcohol Use 11/9/2022   Prescreen: >3 drinks/day or >7 drinks/week? No   Prescreen: >3 drinks/day or >7 drinks/week? -       Reviewed orders with patient.  Reviewed health maintenance and updated orders accordingly -       Breast Cancer Screening:        History of abnormal Pap smear:      Reviewed and updated as needed this visit by clinical staff   Tobacco  Allergies  Meds   Med Hx  Surg Hx  Fam Hx  Soc Hx        Reviewed and updated as needed this visit by Provider                     Review of Systems   Constitutional: Negative for chills and fever.   HENT: Negative for congestion, ear pain, hearing loss and sore throat.    Eyes: Negative for pain and visual  "disturbance.   Respiratory: Negative for cough and shortness of breath.    Cardiovascular: Negative for chest pain, palpitations and peripheral edema.   Gastrointestinal: Negative for abdominal pain, constipation, diarrhea, heartburn, hematochezia and nausea.   Breasts:  Negative for tenderness, breast mass and discharge.   Genitourinary: Negative for dysuria, frequency, genital sores, hematuria, pelvic pain, urgency, vaginal bleeding and vaginal discharge.   Musculoskeletal: Negative for arthralgias, joint swelling and myalgias.   Skin: Negative for rash.   Neurological: Positive for headaches. Negative for dizziness, weakness and paresthesias.   Psychiatric/Behavioral: Negative for mood changes. The patient is not nervous/anxious.           OBJECTIVE:   /72 (Cuff Size: Adult Regular)   Pulse 94   Temp 97.9  F (36.6  C) (Temporal)   Ht 1.638 m (5' 4.5\")   Wt 77.1 kg (170 lb)   SpO2 97%   BMI 28.73 kg/m    Physical Exam  GENERAL: healthy, alert and no distress  NECK: no adenopathy, no asymmetry, masses, or scars and thyroid normal to palpation  RESP: lungs clear to auscultation - no rales, rhonchi or wheezes  CV: regular rate and rhythm, normal S1 S2, no S3 or S4, no murmur, click or rub, no peripheral edema and peripheral pulses strong  ABDOMEN: soft, nontender, no hepatosplenomegaly, no masses and bowel sounds normal  MS: no gross musculoskeletal defects noted, no edema    Diagnostic Test Results:  Labs reviewed in Epic    ASSESSMENT/PLAN:       ICD-10-CM    1. Annual physical exam  Z00.00       2. Autism spectrum disorder  F84.0           Annual topics discussed  Declines cervical cancer screening  Doing well on her current birth control for suppression of her cycle  Continues on Prozac with nice control of her mood, family denies any significant mood swings or irritability  Has a mild headache once weekly.  They will start to track it and see if it relates to her screen use, or her fixation on " "ART  Chronic constipation seems well controlled, will continue to work on adding more natural soluble fiber      COUNSELING:  Reviewed preventive health counseling, as reflected in patient instructions    Estimated body mass index is 28.73 kg/m  as calculated from the following:    Height as of this encounter: 1.638 m (5' 4.5\").    Weight as of this encounter: 77.1 kg (170 lb).        She reports that she has never smoked. She has never used smokeless tobacco.      Counseling Resources:  ATP IV Guidelines  Pooled Cohorts Equation Calculator  Breast Cancer Risk Calculator  BRCA-Related Cancer Risk Assessment: FHS-7 Tool  FRAX Risk Assessment  ICSI Preventive Guidelines  Dietary Guidelines for Americans, 2010  USDA's MyPlate  ASA Prophylaxis  Lung CA Screening    Trent Coley MD  [facility]  "

## 2022-11-11 NOTE — PROGRESS NOTES
Prior to immunization administration, verified patients identity using patient s name and date of birth. Please see Immunization Activity for additional information.     Screening Questionnaire for Adult Immunization    Are you sick today?   No   Do you have allergies to medications, food, a vaccine component or latex?   No   Have you ever had a serious reaction after receiving a vaccination?   No   Do you have a long-term health problem with heart, lung, kidney, or metabolic disease (e.g., diabetes), asthma, a blood disorder, no spleen, complement component deficiency, a cochlear implant, or a spinal fluid leak?  Are you on long-term aspirin therapy?   No   Do you have cancer, leukemia, HIV/AIDS, or any other immune system problem?   No   Do you have a parent, brother, or sister with an immune system problem?   No   In the past 3 months, have you taken medications that affect  your immune system, such as prednisone, other steroids, or anticancer drugs; drugs for the treatment of rheumatoid arthritis, Crohn s disease, or psoriasis; or have you had radiation treatments?   No   Have you had a seizure, or a brain or other nervous system problem?   No   During the past year, have you received a transfusion of blood or blood    products, or been given immune (gamma) globulin or antiviral drug?   No   For women: Are you pregnant or is there a chance you could become       pregnant during the next month?   No   Have you received any vaccinations in the past 4 weeks?   No     Immunization questionnaire answers were all negative.        Per orders of Dr. Coley, injection of Tdap and influenza given by Janay Thibodeaux CMA. Patient instructed to remain in clinic for 15 minutes afterwards, and to report any adverse reaction to me immediately.       Screening performed by Janay Thibodeaux CMA on 11/11/2022 at 3:12 PM.

## 2022-12-04 ENCOUNTER — LAB (OUTPATIENT)
Dept: LAB | Facility: CLINIC | Age: 22
End: 2022-12-04
Payer: MEDICAID

## 2022-12-04 DIAGNOSIS — Z11.59 NEED FOR HEPATITIS C SCREENING TEST: ICD-10-CM

## 2022-12-04 DIAGNOSIS — E05.00 GRAVES' DISEASE: ICD-10-CM

## 2022-12-04 DIAGNOSIS — Z11.3 SCREENING FOR STDS (SEXUALLY TRANSMITTED DISEASES): ICD-10-CM

## 2022-12-04 DIAGNOSIS — Z11.4 SCREENING FOR HIV (HUMAN IMMUNODEFICIENCY VIRUS): ICD-10-CM

## 2022-12-04 LAB
T3 SERPL-MCNC: 181 NG/DL (ref 85–202)
T4 FREE SERPL-MCNC: 0.98 NG/DL (ref 0.76–1.46)
TSH SERPL DL<=0.005 MIU/L-ACNC: 1.68 MU/L (ref 0.4–4)

## 2022-12-04 PROCEDURE — 84445 ASSAY OF TSI GLOBULIN: CPT | Mod: 90

## 2022-12-04 PROCEDURE — 36415 COLL VENOUS BLD VENIPUNCTURE: CPT

## 2022-12-04 PROCEDURE — 84439 ASSAY OF FREE THYROXINE: CPT

## 2022-12-04 PROCEDURE — 84480 ASSAY TRIIODOTHYRONINE (T3): CPT

## 2022-12-04 PROCEDURE — 83520 IMMUNOASSAY QUANT NOS NONAB: CPT | Mod: 90

## 2022-12-04 PROCEDURE — 84443 ASSAY THYROID STIM HORMONE: CPT

## 2022-12-05 LAB — TSH RECEP AB SER-ACNC: 1.93 IU/L (ref 0–1.75)

## 2022-12-07 LAB — TSI SER-ACNC: 1.8 TSI INDEX

## 2022-12-09 ENCOUNTER — OFFICE VISIT (OUTPATIENT)
Dept: ENDOCRINOLOGY | Facility: CLINIC | Age: 22
End: 2022-12-09
Payer: MEDICAID

## 2022-12-09 VITALS
OXYGEN SATURATION: 97 % | SYSTOLIC BLOOD PRESSURE: 118 MMHG | WEIGHT: 170.8 LBS | HEART RATE: 88 BPM | HEIGHT: 65 IN | TEMPERATURE: 97.5 F | BODY MASS INDEX: 28.46 KG/M2 | DIASTOLIC BLOOD PRESSURE: 84 MMHG

## 2022-12-09 DIAGNOSIS — R42 DIZZINESS: ICD-10-CM

## 2022-12-09 DIAGNOSIS — E05.00 GRAVES' DISEASE: Primary | ICD-10-CM

## 2022-12-09 DIAGNOSIS — E05.90 HYPERTHYROIDISM: ICD-10-CM

## 2022-12-09 PROCEDURE — 99214 OFFICE O/P EST MOD 30 MIN: CPT | Performed by: INTERNAL MEDICINE

## 2022-12-09 ASSESSMENT — PAIN SCALES - GENERAL: PAINLEVEL: MODERATE PAIN (5)

## 2022-12-09 NOTE — LETTER
12/9/2022         RE: Maren Winchester  [address] [place] [address]        Dear Colleague,    Thank you for referring your patient, Maren Winchester, to the [facility]. Please see a copy of my visit note below.                                                                                 - Endocrinology follow up -    Reason for visit/consult: Hyperthyroidism, Graves disease    Primary care provider: Leidy Carrizales      Assessment and Plan  22 year old female with hyperthyroidism    # Grave' disease  TSI, TSH receptor Ab positive. Diagnosed Graves disease in August 2019. Taking methimazole 1 year and now (current dose methimazole 5 mg)  euthryoid. Rather some symptoms of hypothyroidism (constipation, weight gain 15 lb over 6 month, fatigue), we will reduce the dose  , then currenlty half pill Mon/Wed/Fri for 1 year (since 7/2020) and started to have some palpitation and eye puffiness. Currently past 6 month, methimazole 2.65 mg daily and euthryoid doing well.     - continue methimazole 2.5 mg daily for now    - TSH, free T4, total T3 in every 3 months    # Dizziness/fatigue  Unclear reason,   - am cortisol, ACTH    # menstrual cycle  On BCP every 3 month       Follow up with me in 6 month       Total 30 minutes spent on the date of the encounter doing chart review, history and exam, documentation and further activities as noted above.    Lashonda Vallecillo MD  Staff Physician  Endocrinology and Metabolism  [facility]  License: [place] [id number]  Pager: [phone]    Interval History as of 12/9/2022 : Patient has been doing ok, compliant to methimazole, feeling dizzy episodes recently, BP was ok 120s.  .  Interval History as of 6/17/2022 : Patient has been doing well. Medication compliance: excellent   . New event includes: no pertinent medical event noted  .  Interval History as of 7/16/2021 : Patient has been doing well, but slight puffy eye. Occasional  palpitation? Medication compliance excellent  On methimazole 2.5 mg Mon/Wed/Fri for 1 year.  Interval History as of 1/15/2021 : Patient has been doing well. Last seen 6 month ago . Medication compliance excellent  . 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  Interval History as of 7/24/2020 : Patient has been doing well. Last seen 7 month ago . Medication compliance excellent. Taking methimazole 1 year and now (current dose methimazole 5 mg)  euthryoid. Rather some symptoms of hypothyroidism (constipation, weight gain, fatigue), we will reduce the dose.  Interval History as of 1/24/2020 : Patient has been doing well.  Medication compliance excellent  . New event includes : occasional HA, no N/V. No palpitation. eue symptoms getting better. Enjoying working [employer] and [employer].   Interval History as of 10/18/2019 Diagnosed in August 2019 currently she is on methimazole 5 mg and propranolol 2 0 mg twice daily.  Her parents are taking care of her medication and that she is compliant.  She still mentioned fatigue, insomnia, however by examining her today her tachycardia resolved and she had a slight swelling to the size of goiter.  TSH is still suppressed however free T4 became normalized.   HPI: A 17 yo female here for the consultation of her hyperthyroidism.  Patient came with her parents today.     Patient mentioned for the past 6 months she started to have mild dysphagia when she eats breakfast and some sore throat.  Also excessive fatigue, insomnia.  She also started to have eye itchiness past several months, she went to ophthalmologist her vision was okay.   She gained 5 pounds since April 2019 she is hungry was that time.   She usually has constipation using the laxative however she started to have some diarrhea.   Her mood tends to be more depressed recently and also increase anxiety level according to the parents.   Patient also has family history of thyroid condition in her maternal  "side.  Mother : hemithyroidectomy. Maternal aunt: goiter  maternal grandmother: DM    Past Medical/Surgical History:  Past Medical History:   Diagnosis Date     ADHD (attention deficit hyperactivity disorder)     [place] diagnosis     Autism     autism spectrum disorder     Mental retardation     [place] diagnosis     No past surgical history on file.    Allergies:  No Known Allergies    Current Medications   Current Outpatient Medications   Medication     cholecalciferol (VITAMIN D3) 25 mcg (1000 units) capsule     FLUoxetine (PROZAC) 20 MG capsule     JULEBER 0.15-30 MG-MCG tablet     methimazole (TAPAZOLE) 5 MG tablet     Multiple Vitamins-Minerals (MULTIVITAL PO)     polyethylene glycol (MIRALAX) 17 GM/Dose powder     vitamin C (ASCORBIC ACID) 500 MG tablet     No current facility-administered medications for this visit.       Family History:  Family History   Problem Relation Age of Onset     Asthma No family hx of      Cerebrovascular Disease No family hx of      Depression No family hx of      Obesity No family hx of    Mother : hemithyroidectomy. Maternal aunt: goiter  maternal grandmother: DM    Social History:  Social History     Tobacco Use     Smoking status: Never     Smokeless tobacco: Never   Substance Use Topics     Alcohol use: No     Alcohol/week: 0.0 standard drinks   Lives with parents, 1 brother and 1 sister  Sister: seizure disorder,   Brother: healthy  Transition program, [employer], cafeteria,     ROS:  Full review of systems taken with the help of the intake sheet. Otherwise a complete 14 point review of systems was taken and is negative unless stated in the history above.      Physical Exam:   /84 (BP Location: Left arm, Patient Position: Sitting, Cuff Size: Adult Regular)   Pulse 88   Temp 97.5  F (36.4  C) (Oral)   Ht 1.638 m (5' 4.5\")   Wt 77.5 kg (170 lb 12.8 oz)   SpO2 97%   BMI 28.86 kg/m       General: well appearing, no acute distress, pleasant and conversant,   Mental " Status/neuro: alert and oriented  Face: symmetrical, normal facial color  Eyes: anicteric, no proptosis or lid lag,  Neuro: slight hyperreflex  Resp: no acute distress  Hands: No tremor bilateral      Labs : I reviewed data from epic and extract and summarize the pertinent data here.      8/30/2019 12:21   Thyrotropin Receptor Antibody 5.44 (H)      Ref. Range 8/30/2019 12:21   Thyroid Stim Immunog Latest Ref Range: <=1.3 TSI index 1.9 (H)        10/11/2019 15:06   T4 Free 1.41   Triiodothyronine (T3) 234 (H)   TSH <0.01 (L)        Ref. Range 1/7/2016 14:21 1/24/2017 08:15 4/3/2019 16:42 8/22/2019 15:07 8/27/2019 07:46   T4 Free Latest Ref Range: 0.76 - 1.46 ng/dL 1.10 1.29 1.13 1.85 (H) 1.77 (H)   TSH Latest Ref Range: 0.40 - 4.00 mU/L 1.13 0.81 0.13 (L) <0.01 (L) <0.01 (L)   Free T3 Latest Ref Range: 2.3 - 4.2 pg/mL    6.3 (H)    Thyroglobulin Antibody Latest Ref Range: <40 IU/mL     <20   Thyroid Peroxidase Antibody Latest Ref Range: <35 IU/mL     101 (H)     Lab Results   Component Value Date     08/27/2019      Lab Results   Component Value Date    POTASSIUM 4.0 08/27/2019     Lab Results   Component Value Date    CHLORIDE 107 08/27/2019     Lab Results   Component Value Date    MAIA 8.7 08/27/2019     Lab Results   Component Value Date    CO2 23 08/27/2019     Lab Results   Component Value Date    BUN 11 08/27/2019     Lab Results   Component Value Date    CR 0.45 08/27/2019     Lab Results   Component Value Date    GLC 86 08/27/2019     Lab Results   Component Value Date    TSH <0.01 08/27/2019     Lab Results   Component Value Date    T4 1.77 08/27/2019     Lab Results   Component Value Date    A1C 5.6 03/23/2015                       Again, thank you for allowing me to participate in the care of your patient.        Sincerely,        Lashonda Vallecillo MD

## 2022-12-09 NOTE — PROGRESS NOTES
- Endocrinology follow up -    Reason for visit/consult: Hyperthyroidism, Graves disease    Primary care provider: Leidy Carrizales      Assessment and Plan  22 year old female with hyperthyroidism    # Grave' disease  TSI, TSH receptor Ab positive. Diagnosed Graves disease in August 2019. Taking methimazole 1 year and now (current dose methimazole 5 mg)  euthryoid. Rather some symptoms of hypothyroidism (constipation, weight gain 15 lb over 6 month, fatigue), we will reduce the dose  , then currenlty half pill Mon/Wed/Fri for 1 year (since 7/2020) and started to have some palpitation and eye puffiness. Currently past 6 month, methimazole 2.65 mg daily and euthryoid doing well.     - continue methimazole 2.5 mg daily for now    - TSH, free T4, total T3 in every 3 months    # Dizziness/fatigue  Unclear reason,   - am cortisol, ACTH    # menstrual cycle  On BCP every 3 month       Follow up with me in 6 month       Total 30 minutes spent on the date of the encounter doing chart review, history and exam, documentation and further activities as noted above.    Lashonda Vallecillo MD  Staff Physician  Endocrinology and Metabolism  [facility]  License: [place] [id number]  Pager: [phone]    Interval History as of 12/9/2022 : Patient has been doing ok, compliant to methimazole, feeling dizzy episodes recently, BP was ok 120s.  .  Interval History as of 6/17/2022 : Patient has been doing well. Medication compliance: excellent   . New event includes: no pertinent medical event noted  .  Interval History as of 7/16/2021 : Patient has been doing well, but slight puffy eye. Occasional palpitation? Medication compliance excellent  On methimazole 2.5 mg Mon/Wed/Fri for 1 year.  Interval History as of 1/15/2021 : Patient has been doing well. Last seen 6 month ago . Medication compliance excellent  . 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  Interval History as of 7/24/2020 : Patient has been doing well. Last seen 7 month ago . Medication compliance excellent. Taking methimazole 1 year and now (current dose methimazole 5 mg)  euthryoid. Rather some symptoms of hypothyroidism (constipation, weight gain, fatigue), we will reduce the dose.  Interval History as of 1/24/2020 : Patient has been doing well.  Medication compliance excellent  . New event includes : occasional HA, no N/V. No palpitation. eue symptoms getting better. Enjoying working [employer] and [employer].   Interval History as of 10/18/2019 Diagnosed in August 2019 currently she is on methimazole 5 mg and propranolol 2 0 mg twice daily.  Her parents are taking care of her medication and that she is compliant.  She still mentioned fatigue, insomnia, however by examining her today her tachycardia resolved and she had a slight swelling to the size of goiter.  TSH is still suppressed however free T4 became normalized.   HPI: A 17 yo female here for the consultation of her hyperthyroidism.  Patient came with her parents today.     Patient mentioned for the past 6 months she started to have mild dysphagia when she eats breakfast and some sore throat.  Also excessive fatigue, insomnia.  She also started to have eye itchiness past several months, she went to ophthalmologist her vision was okay.   She gained 5 pounds since April 2019 she is hungry was that time.   She usually has constipation using the laxative however she started to have some diarrhea.   Her mood tends to be more depressed recently and also increase anxiety level according to the parents.   Patient also has family history of thyroid condition in her maternal side.  Mother : hemithyroidectomy. Maternal aunt: goiter  maternal grandmother: DM    Past Medical/Surgical History:  Past Medical History:   Diagnosis Date     ADHD (attention deficit hyperactivity disorder)     [place] diagnosis     Autism     autism spectrum disorder  "    Mental retardation     [place] diagnosis     No past surgical history on file.    Allergies:  No Known Allergies    Current Medications   Current Outpatient Medications   Medication     cholecalciferol (VITAMIN D3) 25 mcg (1000 units) capsule     FLUoxetine (PROZAC) 20 MG capsule     JULEBER 0.15-30 MG-MCG tablet     methimazole (TAPAZOLE) 5 MG tablet     Multiple Vitamins-Minerals (MULTIVITAL PO)     polyethylene glycol (MIRALAX) 17 GM/Dose powder     vitamin C (ASCORBIC ACID) 500 MG tablet     No current facility-administered medications for this visit.       Family History:  Family History   Problem Relation Age of Onset     Asthma No family hx of      Cerebrovascular Disease No family hx of      Depression No family hx of      Obesity No family hx of    Mother : hemithyroidectomy. Maternal aunt: goiter  maternal grandmother: DM    Social History:  Social History     Tobacco Use     Smoking status: Never     Smokeless tobacco: Never   Substance Use Topics     Alcohol use: No     Alcohol/week: 0.0 standard drinks   Lives with parents, 1 brother and 1 sister  Sister: seizure disorder,   Brother: healthy  Transition program, [employer], cafeteria,     ROS:  Full review of systems taken with the help of the intake sheet. Otherwise a complete 14 point review of systems was taken and is negative unless stated in the history above.      Physical Exam:   /84 (BP Location: Left arm, Patient Position: Sitting, Cuff Size: Adult Regular)   Pulse 88   Temp 97.5  F (36.4  C) (Oral)   Ht 1.638 m (5' 4.5\")   Wt 77.5 kg (170 lb 12.8 oz)   SpO2 97%   BMI 28.86 kg/m       General: well appearing, no acute distress, pleasant and conversant,   Mental Status/neuro: alert and oriented  Face: symmetrical, normal facial color  Eyes: anicteric, no proptosis or lid lag,  Neuro: slight hyperreflex  Resp: no acute distress  Hands: No tremor bilateral      Labs : I reviewed data from epic and extract and summarize the pertinent " data here.      8/30/2019 12:21   Thyrotropin Receptor Antibody 5.44 (H)      Ref. Range 8/30/2019 12:21   Thyroid Stim Immunog Latest Ref Range: <=1.3 TSI index 1.9 (H)        10/11/2019 15:06   T4 Free 1.41   Triiodothyronine (T3) 234 (H)   TSH <0.01 (L)        Ref. Range 1/7/2016 14:21 1/24/2017 08:15 4/3/2019 16:42 8/22/2019 15:07 8/27/2019 07:46   T4 Free Latest Ref Range: 0.76 - 1.46 ng/dL 1.10 1.29 1.13 1.85 (H) 1.77 (H)   TSH Latest Ref Range: 0.40 - 4.00 mU/L 1.13 0.81 0.13 (L) <0.01 (L) <0.01 (L)   Free T3 Latest Ref Range: 2.3 - 4.2 pg/mL    6.3 (H)    Thyroglobulin Antibody Latest Ref Range: <40 IU/mL     <20   Thyroid Peroxidase Antibody Latest Ref Range: <35 IU/mL     101 (H)     Lab Results   Component Value Date     08/27/2019      Lab Results   Component Value Date    POTASSIUM 4.0 08/27/2019     Lab Results   Component Value Date    CHLORIDE 107 08/27/2019     Lab Results   Component Value Date    MAIA 8.7 08/27/2019     Lab Results   Component Value Date    CO2 23 08/27/2019     Lab Results   Component Value Date    BUN 11 08/27/2019     Lab Results   Component Value Date    CR 0.45 08/27/2019     Lab Results   Component Value Date    GLC 86 08/27/2019     Lab Results   Component Value Date    TSH <0.01 08/27/2019     Lab Results   Component Value Date    T4 1.77 08/27/2019     Lab Results   Component Value Date    A1C 5.6 03/23/2015

## 2022-12-11 ENCOUNTER — LAB (OUTPATIENT)
Dept: LAB | Facility: CLINIC | Age: 22
End: 2022-12-11
Payer: MEDICAID

## 2022-12-11 DIAGNOSIS — R42 DIZZINESS: ICD-10-CM

## 2022-12-11 DIAGNOSIS — E05.00 GRAVES' DISEASE: ICD-10-CM

## 2022-12-11 LAB
CORTIS SERPL-MCNC: 23.9 UG/DL
T3 SERPL-MCNC: 164 NG/DL (ref 85–202)
T4 FREE SERPL-MCNC: 1.01 NG/DL (ref 0.76–1.46)
TSH SERPL DL<=0.005 MIU/L-ACNC: 1.42 MU/L (ref 0.4–4)

## 2022-12-11 PROCEDURE — 82024 ASSAY OF ACTH: CPT

## 2022-12-11 PROCEDURE — 36415 COLL VENOUS BLD VENIPUNCTURE: CPT

## 2022-12-11 PROCEDURE — 82533 TOTAL CORTISOL: CPT

## 2022-12-11 PROCEDURE — 84480 ASSAY TRIIODOTHYRONINE (T3): CPT

## 2022-12-11 PROCEDURE — 84439 ASSAY OF FREE THYROXINE: CPT

## 2022-12-11 PROCEDURE — 84443 ASSAY THYROID STIM HORMONE: CPT

## 2022-12-12 LAB — ACTH PLAS-MCNC: <10 PG/ML

## 2022-12-15 DIAGNOSIS — F41.1 GAD (GENERALIZED ANXIETY DISORDER): ICD-10-CM

## 2022-12-16 NOTE — TELEPHONE ENCOUNTER
Prescription approved per [facility] Refill Protocol.    JODY Humphries

## 2023-01-10 ENCOUNTER — MYC MEDICAL ADVICE (OUTPATIENT)
Dept: FAMILY MEDICINE | Facility: CLINIC | Age: 23
End: 2023-01-10

## 2023-01-11 ENCOUNTER — NURSE TRIAGE (OUTPATIENT)
Dept: FAMILY MEDICINE | Facility: CLINIC | Age: 23
End: 2023-01-11
Payer: MEDICAID

## 2023-01-11 DIAGNOSIS — N92.0 MENORRHAGIA WITH REGULAR CYCLE: ICD-10-CM

## 2023-01-11 RX ORDER — DESOGESTREL AND ETHINYL ESTRADIOL 0.15-0.03
KIT ORAL
Qty: 112 TABLET | Refills: 3 | Status: SHIPPED | OUTPATIENT
Start: 2023-01-11 | End: 2024-03-12

## 2023-01-11 NOTE — TELEPHONE ENCOUNTER
Mom is calling back. RN relayed message to mom from provider below. Mom understands and agrees. She describes that patient is not currently having symptoms but will take patient in if symptoms worsen or continue. Mom wanted to wait for PCP to return and see if PCP can see them next week.     RN reviewed red flag symptoms with mom and when to see emergency care. Mom agreed and understood.     Can you see patient when you return?    PAMELA Todd, RN

## 2023-01-11 NOTE — TELEPHONE ENCOUNTER
Prescription approved per [facility] Refill Protocol. Saw provider 11/11/22.     Yandel Langley, BSN RN

## 2023-01-11 NOTE — TELEPHONE ENCOUNTER
Left message at home number.    Please triage symptoms when she returns the call.    Katia Mane RN on 1/11/2023 at 9:21 AM

## 2023-01-11 NOTE — TELEPHONE ENCOUNTER
There is no availability today in the clinic.  If she is continuing to have dizziness or symptoms, she will need to go to the ED.  Otherwise we could set her up with a Zio patch heart monitor.    Electronically signed by:  Daquan Jaramillo M.D.  1/11/2023

## 2023-01-11 NOTE — TELEPHONE ENCOUNTER
Nurse Triage SBAR    Is this a 2nd Level Triage? YES, LICENSED PRACTITIONER REVIEW IS REQUIRED    Situation: Mother is calling in regarding patient having heart flutters. Patient has autism and developmental delay history. She is able to communicate pretty well mom say. Patient has been complaining of her hear fluttering and feeling funny and needs to sit down. Patient will sit down and after about 1-2 minutes, it resolves. This is happening at least once per day, sometimes twice daily. Denies chest pains with it, denies shortness of breath. She does c/o dizziness and a headache with episodes. Mother said patient was feeling well, at baseline this morning. Patient is currently not having any symptom's.     Background: heart fluttering, dizziness, headache.     Assessment: Advised to be evaluated today by provider per protocol. No clinic openings. Will send to see if any provider can fit her in for appointment. RN reviewed red flag symptoms with patient and when to see emergency care. Patient's mother agreed and verbalized understanding. She will bring patient to ED if she has not heard back and patient has dizziness with heart fluttering again.      Protocol Recommended Disposition:   See in Office Today    Recommendation:   Can patient be fit in on schedule today? Or do you recommend patient being seen in UC or ED? Please review and give recommendations.      TextD[employer] Message sent on 1/10/23:   This message is being sent by Gisel Presley on behalf of Maren Winchester.     Maren Grande has been complaining of heart fluttering , I guess. It s hard for her to explain it but she is having to quickly get to a chair to sit. It passes after a minute or two but it s happening more often.  She says she doesn t know why it s happening but she doesn t like the feeling.  This is concerning to me.  Is there any tests that can be done to see if things are ok?  Thank you  Marine Presley (Deidre Mom)      Routed to  provider    Does the patient meet one of the following criteria for ADS visit consideration? No    Reason for Disposition    Heart beating very rapidly (e.g., > 140 / minute) and not present now  (Exception: During exercise.)    Additional Information    Negative: Passed out (i.e., lost consciousness, collapsed and was not responding)    Negative: Shock suspected (e.g., cold/pale/clammy skin, too weak to stand, low BP, rapid pulse)    Negative: Difficult to awaken or acting confused (e.g., disoriented, slurred speech)    Negative: Visible sweat on face or sweat dripping down face    Negative: Unable to walk, or can only walk with assistance (e.g., requires support)    Negative: Received SHOCK from implantable cardiac defibrillator and has persisting symptoms (i.e., palpitations, lightheadedness)    Negative: Dizziness, lightheadedness, or weakness and heart beating very rapidly (e.g., > 140 / minute)    Negative: Dizziness, lightheadedness, or weakness and heart beating very slowly (e.g., < 50 / minute)    Negative: Sounds like a life-threatening emergency to the triager    Negative: Chest pain    Negative: Difficulty breathing    Negative: Dizziness, lightheadedness, or weakness    Negative: Heart beating very rapidly (e.g., > 140 / minute) and present now  (Exception: During exercise.)    Negative: Heart beating very slowly (e.g., < 50 / minute)  (Exception: Athlete and heart rate normal for caller.)    Negative: New or worsened shortness of breath with activity (dyspnea on exertion)    Negative: Patient sounds very sick or weak to the triager    Negative: Wearing a 'Holter monitor' or 'cardiac event monitor'    Negative: Received SHOCK from implantable cardiac defibrillator (and now feels well)    Protocols used: HEART RATE AND HEARTBEAT [id number]

## 2023-06-11 DIAGNOSIS — F41.1 GAD (GENERALIZED ANXIETY DISORDER): ICD-10-CM

## 2023-07-12 ENCOUNTER — OFFICE VISIT (OUTPATIENT)
Dept: FAMILY MEDICINE | Facility: CLINIC | Age: 23
End: 2023-07-12
Payer: MEDICAID

## 2023-07-12 VITALS
HEART RATE: 83 BPM | OXYGEN SATURATION: 98 % | TEMPERATURE: 98.3 F | BODY MASS INDEX: 29.06 KG/M2 | SYSTOLIC BLOOD PRESSURE: 104 MMHG | HEIGHT: 65 IN | RESPIRATION RATE: 16 BRPM | WEIGHT: 174.4 LBS | DIASTOLIC BLOOD PRESSURE: 72 MMHG

## 2023-07-12 DIAGNOSIS — Z11.4 SCREENING FOR HIV (HUMAN IMMUNODEFICIENCY VIRUS): ICD-10-CM

## 2023-07-12 DIAGNOSIS — Z11.59 NEED FOR HEPATITIS C SCREENING TEST: ICD-10-CM

## 2023-07-12 DIAGNOSIS — Z23 NEED FOR PROPHYLACTIC VACCINATION AGAINST VIRAL DISEASE: ICD-10-CM

## 2023-07-12 DIAGNOSIS — Z11.3 SCREENING FOR STDS (SEXUALLY TRANSMITTED DISEASES): ICD-10-CM

## 2023-07-12 DIAGNOSIS — Z12.4 CERVICAL CANCER SCREENING: ICD-10-CM

## 2023-07-12 DIAGNOSIS — F84.0 AUTISM SPECTRUM DISORDER: Primary | ICD-10-CM

## 2023-07-12 PROCEDURE — 99395 PREV VISIT EST AGE 18-39: CPT | Mod: 25 | Performed by: FAMILY MEDICINE

## 2023-07-12 PROCEDURE — 90651 9VHPV VACCINE 2/3 DOSE IM: CPT | Performed by: FAMILY MEDICINE

## 2023-07-12 PROCEDURE — 90471 IMMUNIZATION ADMIN: CPT | Performed by: FAMILY MEDICINE

## 2023-07-12 ASSESSMENT — ENCOUNTER SYMPTOMS
HEADACHES: 1
ABDOMINAL PAIN: 0
ARTHRALGIAS: 0
PARESTHESIAS: 0
DIARRHEA: 1
MYALGIAS: 0
FREQUENCY: 1
NAUSEA: 0
HEMATOCHEZIA: 0
JOINT SWELLING: 0
HEMATURIA: 0
CHILLS: 0
FEVER: 0
WEAKNESS: 0
CONSTIPATION: 0
PALPITATIONS: 0
COUGH: 0
SORE THROAT: 0
DYSURIA: 0
DIZZINESS: 1
EYE PAIN: 1
SHORTNESS OF BREATH: 0
HEARTBURN: 0
NERVOUS/ANXIOUS: 1

## 2023-07-12 ASSESSMENT — PAIN SCALES - GENERAL: PAINLEVEL: NO PAIN (0)

## 2023-07-12 NOTE — PROGRESS NOTES
SUBJECTIVE:   CC: Maren is an 22 year old who presents for preventive health visit.       7/12/2023    10:55 AM   Additional Questions   Roomed by Steph ZARCO   Accompanied by Mom- Marine     Healthy Habits:     Getting at least 3 servings of Calcium per day:  Yes    Bi-annual eye exam:  Yes    Dental care twice a year:  Yes    Sleep apnea or symptoms of sleep apnea:  None    Diet:  Regular (no restrictions)    Frequency of exercise:  4-5 days/week    Duration of exercise:  15-30 minutes    Taking medications regularly:  Yes    Medication side effects:  None    Additional concerns today:  No      Today's PHQ-2 Score:       7/12/2023    10:32 AM   PHQ-2 ( 1999 [employer])   Q1: Little interest or pleasure in doing things 0   Q2: Feeling down, depressed or hopeless 0   PHQ-2 Score 0   Q1: Little interest or pleasure in doing things Not at all   Q2: Feeling down, depressed or hopeless Not at all   PHQ-2 Score 0                       Social History     Tobacco Use    Smoking status: Never    Smokeless tobacco: Never   Substance Use Topics    Alcohol use: No     Alcohol/week: 0.0 standard drinks of alcohol             7/12/2023    10:32 AM   Alcohol Use   Prescreen: >3 drinks/day or >7 drinks/week? Not Applicable     Reviewed orders with patient.  Reviewed health maintenance and updated orders accordingly -       Breast Cancer Screening:        History of abnormal Pap smear:      Reviewed and updated as needed this visit by clinical staff   Tobacco  Allergies  Meds              Reviewed and updated as needed this visit by Provider                     Review of Systems   Constitutional: Negative for chills and fever.   HENT: Negative for congestion, ear pain, hearing loss and sore throat.    Eyes: Positive for pain. Negative for visual disturbance.   Respiratory: Negative for cough and shortness of breath.    Cardiovascular: Negative for chest pain, palpitations and peripheral edema.   Gastrointestinal: Positive for  "diarrhea. Negative for abdominal pain, constipation, heartburn, hematochezia and nausea.   Genitourinary: Positive for frequency. Negative for dysuria, genital sores, hematuria and urgency.   Musculoskeletal: Negative for arthralgias, joint swelling and myalgias.   Skin: Negative for rash.   Neurological: Positive for dizziness and headaches. Negative for weakness and paresthesias.   Psychiatric/Behavioral: Positive for mood changes. The patient is nervous/anxious.           OBJECTIVE:   /72   Pulse 83   Temp 98.3  F (36.8  C) (Temporal)   Resp 16   Ht 1.638 m (5' 4.5\")   Wt 79.1 kg (174 lb 6.4 oz)   LMP 05/15/2023 (Approximate)   SpO2 98%   BMI 29.47 kg/m    Physical Exam  GENERAL: healthy, alert and no distress  NECK: no adenopathy, no asymmetry, masses, or scars and thyroid normal to palpation  RESP: lungs clear to auscultation - no rales, rhonchi or wheezes  CV: regular rate and rhythm, normal S1 S2, no S3 or S4, no murmur, click or rub, no peripheral edema and peripheral pulses strong  ABDOMEN: soft, nontender, no hepatosplenomegaly, no masses and bowel sounds normal  MS: no gross musculoskeletal defects noted, no edema        ASSESSMENT/PLAN:       ICD-10-CM    1. Autism spectrum disorder  F84.0       2. Screening for STDs (sexually transmitted diseases)  Z11.3       3. Screening for HIV (human immunodeficiency virus)  Z11.4       4. Need for hepatitis C screening test  Z11.59       5. Cervical cancer screening  Z12.4       6. Need for prophylactic vaccination against viral disease  Z23 HPV9 (GARDASIL 9)        Annual topics discussed  Autism.  Great family support.  Lives with mom who is present here today.  No specific concerns.  Continues to work and flourish in her home environment.  Cervical cancer screening.  She is due for pelvic.  This be quite distressing to her.  Mom will continue to talk with her about potentially an exam under anesthesia.    Immunizations " "updated    COUNSELING:  Reviewed preventive health counseling, as reflected in patient instructions      BMI:   Estimated body mass index is 29.47 kg/m  as calculated from the following:    Height as of this encounter: 1.638 m (5' 4.5\").    Weight as of this encounter: 79.1 kg (174 lb 6.4 oz).         She reports that she has never smoked. She has never used smokeless tobacco.          Trent Coley MD  [facility]  "

## 2023-07-12 NOTE — NURSING NOTE
Prior to immunization administration, verified patients identity using patient s name and date of birth. Please see Immunization Activity for additional information.     Screening Questionnaire for Adult Immunization    Are you sick today?   No   Do you have allergies to medications, food, a vaccine component or latex?   No   Have you ever had a serious reaction after receiving a vaccination?   No   Do you have a long-term health problem with heart, lung, kidney, or metabolic disease (e.g., diabetes), asthma, a blood disorder, no spleen, complement component deficiency, a cochlear implant, or a spinal fluid leak?  Are you on long-term aspirin therapy?   No   Do you have cancer, leukemia, HIV/AIDS, or any other immune system problem?   No   Do you have a parent, brother, or sister with an immune system problem?   No   In the past 3 months, have you taken medications that affect  your immune system, such as prednisone, other steroids, or anticancer drugs; drugs for the treatment of rheumatoid arthritis, Crohn s disease, or psoriasis; or have you had radiation treatments?   No   Have you had a seizure, or a brain or other nervous system problem?   No   During the past year, have you received a transfusion of blood or blood    products, or been given immune (gamma) globulin or antiviral drug?   No   For women: Are you pregnant or is there a chance you could become       pregnant during the next month?   No   Have you received any vaccinations in the past 4 weeks?   No     Immunization questionnaire answers were all negative.      Patient instructed to remain in clinic for 15 minutes afterwards, and to report any adverse reactions.     Screening performed by Steph Benitez RN on 7/12/2023 at 11:39 AM.

## 2023-08-08 DIAGNOSIS — E05.90 HYPERTHYROIDISM: ICD-10-CM

## 2023-08-08 DIAGNOSIS — E05.00 GRAVES' DISEASE: ICD-10-CM

## 2023-08-11 RX ORDER — METHIMAZOLE 5 MG/1
2.5 TABLET ORAL DAILY
Qty: 45 TABLET | Refills: 3 | Status: SHIPPED | OUTPATIENT
Start: 2023-08-11 | End: 2023-08-13

## 2023-08-11 NOTE — TELEPHONE ENCOUNTER
methimazole (TAPAZOLE) 5 MG tablet      Last Written Prescription Date:  8/12/22  Last Fill Quantity: 45,   # refills: 3  Last Office Visit : 12/9/22  Future Office visit:  10/13/23    Routing refill request to provider for review/approval because:  Overdue for lab: CBC  Drug class requires provider review

## 2023-08-13 DIAGNOSIS — E05.00 GRAVES' DISEASE: ICD-10-CM

## 2023-08-13 DIAGNOSIS — E05.90 HYPERTHYROIDISM: ICD-10-CM

## 2023-08-13 RX ORDER — METHIMAZOLE 5 MG/1
2.5 TABLET ORAL DAILY
Qty: 45 TABLET | Refills: 3 | Status: SHIPPED | OUTPATIENT
Start: 2023-08-13 | End: 2024-03-15

## 2023-08-14 ENCOUNTER — ALLIED HEALTH/NURSE VISIT (OUTPATIENT)
Dept: FAMILY MEDICINE | Facility: CLINIC | Age: 23
End: 2023-08-14
Payer: MEDICAID

## 2023-08-14 DIAGNOSIS — Z23 NEED FOR HPV VACCINATION: Primary | ICD-10-CM

## 2023-08-14 PROCEDURE — 90651 9VHPV VACCINE 2/3 DOSE IM: CPT

## 2023-08-14 PROCEDURE — 99207 PR NO CHARGE NURSE ONLY: CPT

## 2023-08-14 PROCEDURE — 90471 IMMUNIZATION ADMIN: CPT

## 2023-08-14 NOTE — PROGRESS NOTES
Prior to immunization administration, verified patients identity using patient s name and date of birth. Please see Immunization Activity for additional information.     Screening Questionnaire for Adult Immunization    Are you sick today?   No   Do you have allergies to medications, food, a vaccine component or latex?   No   Have you ever had a serious reaction after receiving a vaccination?   No   Do you have a long-term health problem with heart, lung, kidney, or metabolic disease (e.g., diabetes), asthma, a blood disorder, no spleen, complement component deficiency, a cochlear implant, or a spinal fluid leak?  Are you on long-term aspirin therapy?   No   Do you have cancer, leukemia, HIV/AIDS, or any other immune system problem?   No   Do you have a parent, brother, or sister with an immune system problem?   No   In the past 3 months, have you taken medications that affect  your immune system, such as prednisone, other steroids, or anticancer drugs; drugs for the treatment of rheumatoid arthritis, Crohn s disease, or psoriasis; or have you had radiation treatments?   No   Have you had a seizure, or a brain or other nervous system problem?   No   During the past year, have you received a transfusion of blood or blood    products, or been given immune (gamma) globulin or antiviral drug?   No   For women: Are you pregnant or is there a chance you could become       pregnant during the next month?   No   Have you received any vaccinations in the past 4 weeks?   No     Immunization questionnaire answers were all negative.    I have reviewed the following standing orders:   This patient is due and qualifies for the HPV vaccine.    Click here for HPV (Adult 15-45Y) Standing Order     I have reviewed the vaccines inclusion and exclusion criteria;No concerns regarding eligibility.       Patient instructed to remain in clinic for 15 minutes afterwards, and to report any adverse reactions.     Screening performed by  Aurea Patel, CMA on 8/14/2023 at 10:55 AM.

## 2023-08-30 ENCOUNTER — TRANSFERRED RECORDS (OUTPATIENT)
Dept: HEALTH INFORMATION MANAGEMENT | Facility: CLINIC | Age: 23
End: 2023-08-30

## 2023-10-07 ENCOUNTER — LAB (OUTPATIENT)
Dept: LAB | Facility: CLINIC | Age: 23
End: 2023-10-07
Payer: MEDICAID

## 2023-10-07 DIAGNOSIS — E05.00 GRAVES' DISEASE: ICD-10-CM

## 2023-10-07 LAB
T3 SERPL-MCNC: 167 NG/DL (ref 85–202)
T4 FREE SERPL-MCNC: 1.31 NG/DL (ref 0.9–1.7)
TSH SERPL DL<=0.005 MIU/L-ACNC: 1.69 UIU/ML (ref 0.3–4.2)

## 2023-10-07 PROCEDURE — 36415 COLL VENOUS BLD VENIPUNCTURE: CPT

## 2023-10-07 PROCEDURE — 84439 ASSAY OF FREE THYROXINE: CPT

## 2023-10-07 PROCEDURE — 84480 ASSAY TRIIODOTHYRONINE (T3): CPT

## 2023-10-07 PROCEDURE — 84443 ASSAY THYROID STIM HORMONE: CPT

## 2023-10-09 ASSESSMENT — ENCOUNTER SYMPTOMS
EYE PAIN: 1
BACK PAIN: 1
LIGHT-HEADEDNESS: 1
CONSTIPATION: 1
EYE IRRITATION: 1

## 2023-10-11 ENCOUNTER — TRANSFERRED RECORDS (OUTPATIENT)
Dept: HEALTH INFORMATION MANAGEMENT | Facility: CLINIC | Age: 23
End: 2023-10-11
Payer: MEDICAID

## 2023-10-12 ENCOUNTER — TRANSCRIBE ORDERS (OUTPATIENT)
Dept: OTHER | Age: 23
End: 2023-10-12

## 2023-10-12 DIAGNOSIS — H50.10 EXOTROPIA: Primary | ICD-10-CM

## 2023-10-13 ENCOUNTER — OFFICE VISIT (OUTPATIENT)
Dept: ENDOCRINOLOGY | Facility: CLINIC | Age: 23
End: 2023-10-13
Payer: MEDICAID

## 2023-10-13 VITALS
OXYGEN SATURATION: 97 % | HEART RATE: 80 BPM | BODY MASS INDEX: 29.57 KG/M2 | SYSTOLIC BLOOD PRESSURE: 118 MMHG | WEIGHT: 175 LBS | DIASTOLIC BLOOD PRESSURE: 80 MMHG

## 2023-10-13 DIAGNOSIS — E05.00 GRAVES' OPHTHALMOPATHY: ICD-10-CM

## 2023-10-13 DIAGNOSIS — E05.00 GRAVES' DISEASE: Primary | ICD-10-CM

## 2023-10-13 PROCEDURE — 99214 OFFICE O/P EST MOD 30 MIN: CPT | Performed by: INTERNAL MEDICINE

## 2023-10-13 NOTE — PATIENT INSTRUCTIONS
[facility]-Department of Endocrinology  Diabetes Educators:   Itzel Bundy, RN and Lena Phillips RN  Clinic Nurse: JODY Quintana  CMA's: Sherice Vazquez and Srinivas   EMT: [email or web address]  Scheduling/Clinic phone number : [phone]   Clinic Fax: [phone]  On-Call Endocrine at the [place] (after hours/weekends): [phone] option [phone]    Please call the number below to schedule your labs.  [facility] [phone]   [facility] [phone]   [place] [phone]   [facility]  [phone]   [facility] [phone]   [place] [phone]   [facility] [place] [place]) [phone]   [facility] Only   [place] [phone]   [employer] [phone]   [place] [phone]   [place] [phone]     Please reach out to the following centers to schedule your imaging appointment:  Imaging (DEXA, CT, MRI, XRAY)    [facility] ([facility], [facility] [place]) [phone]   [facility] ([place], [place]) [phone]   [facility] ([facility]) [phone]   [facility] ([facility]) [phone]

## 2023-10-13 NOTE — LETTER
10/13/2023         RE: Maren Winchester  [id number] [place] [id number]        Dear Colleague,    Thank you for referring your patient, Maren Winchester, to the [facility]. Please see a copy of my visit note below.    Endocrinology follow-up note      Reason for visit/consult: Hyperthyroidism, Graves disease     Assessment and Plan  Maren Winchester is a 23 year old female who presents today for follow-up of hyperthyroidism. Patient's mom is with her at her appointment today.      # Grave' disease - TSI, TSH receptor Ab positive. Diagnosed Graves disease in August 2019.     TSH and FT4 all within normal range on dose of methimazole 2.5 mg daily. Discussed with the patient that their new eye symptoms could be due to Graves', but from a medication stand point her thyroid labs are within normal range and that it was good she had an opthalmology appointment for work-up of eye issues. Discussed in the future to potentially pursue total thyroidectomy, as we have her thyroid as under control as we can from a medication stand point and so to prevent further Graves' disease issues such as eye manifestation, there is some data that thyroidectomy can prevent some of these problems.     - continue methimazole 2.5 mg daily for now  -referral to surgeon for discussion about total thyroidectomy  -Follow-up appointment in one year     # Grave's ophthalmopathy evaluation  She will be seen by Dr. Borrego.     # Dizziness/fatigue     # menstrual cycle  On BCP every 3 month        RTC in 6 month     RAJIV Gurrola  Medical Student    Attending tie-in note  I saw the patient with Gurjit VANCE and directly examined patient and discussed. Agree above note and plan.       30 minutes spent on the date of the encounter doing chart review, history and exam, documentation and further activities as noted above.    Lashonda Vallecillo MD  Staff Physician  Endocrinology and Metabolism  [facility]  [facility]  License: [id number]  Pager: [phone]       Interval History as of 10/13/2023: Patient reports overall doing ok on her current dose of methimazole for symptom management, still has some symptoms today of dizziness and headache. PCP had concerns for eye problems and was referred to the [facility] opthalmology for follow-up and appointment is next week. No rashes of fevers.   Interval History as of 12/9/2022 : Patient has been doing ok, compliant to methimazole, feeling dizzy episodes recently, BP was ok 120s.  .  Interval History as of 6/17/2022 : Patient has been doing well. Medication compliance: excellent   . New event includes: no pertinent medical event noted  .  Interval History as of 7/16/2021 : Patient has been doing well, but slight puffy eye. Occasional palpitation? Medication compliance excellent  On methimazole 2.5 mg Mon/Wed/Fri for 1 year.  Interval History as of 1/15/2021 : Patient has been doing well. Last seen 6 month ago . Medication compliance excellent  . 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  Interval History as of 7/24/2020 : Patient has been doing well. Last seen 7 month ago . Medication compliance excellent. Taking methimazole 1 year and now (current dose methimazole 5 mg)  euthryoid. Rather some symptoms of hypothyroidism (constipation, weight gain, fatigue), we will reduce the dose.  Interval History as of 1/24/2020 : Patient has been doing well.  Medication compliance excellent  . New event includes : occasional HA, no N/V. No palpitation. eue symptoms getting better. Enjoying working [employer].   Interval History as of 10/18/2019 Diagnosed in August 2019 currently she is on methimazole 5 mg and propranolol 2 0 mg twice daily.  Her parents are taking care of her medication and that she is compliant.  She still mentioned fatigue, insomnia, however by examining her today her tachycardia resolved and she had a slight swelling to the  size of goiter.  TSH is still suppressed however free T4 became normalized.   HPI: A 17 yo female here for the consultation of her hyperthyroidism.  Patient came with her parents today.      Patient mentioned for the past 6 months she started to have mild dysphagia when she eats breakfast and some sore throat.  Also excessive fatigue, insomnia.  She also started to have eye itchiness past several months, she went to ophthalmologist her vision was okay.   She gained 5 pounds since April 2019 she is hungry was that time.   She usually has constipation using the laxative however she started to have some diarrhea.   Her mood tends to be more depressed recently and also increase anxiety level according to the parents.   Patient also has family history of thyroid condition in her maternal side.  Mother : hemithyroidectomy. Maternal aunt: goiter  maternal grandmother: DM        Medications:  Current Outpatient Medications   Medication     cholecalciferol (VITAMIN D3) 25 mcg (1000 units) capsule     FLUoxetine (PROZAC) 20 MG capsule     JULEBER 0.15-30 MG-MCG tablet     methimazole (TAPAZOLE) 5 MG tablet     Multiple Vitamins-Minerals (MULTIVITAL PO)     vitamin C (ASCORBIC ACID) 500 MG tablet     polyethylene glycol (MIRALAX) 17 GM/Dose powder     No current facility-administered medications for this visit.        Allergies:   No Known Allergies       PAST MEDICAL HISTORY:   Past Medical History:   Diagnosis Date     ADHD (attention deficit hyperactivity disorder)     [place] diagnosis     Autism     autism spectrum disorder     Mental retardation     [place] diagnosis       PAST SURGICAL HISTORY: No past surgical history on file.    FAMILY HISTORY:   Family History   Problem Relation Age of Onset     Asthma No family hx of      Cerebrovascular Disease No family hx of      Depression No family hx of      Obesity No family hx of    Mother : hemithyroidectomy. Maternal aunt: goiter  maternal grandmother: DM    SOCIAL HISTORY:    Social History     Tobacco Use     Smoking status: Never     Smokeless tobacco: Never   Substance Use Topics     Alcohol use: No     Alcohol/week: 0.0 standard drinks of alcohol      Works at [employer] 2-3 days a week and enjoys her work     Labs:  TSH   Date Value Ref Range Status   10/07/2023 1.69 0.30 - 4.20 uIU/mL Final   12/11/2022 1.42 0.40 - 4.00 mU/L Final   07/10/2021 1.62 0.40 - 4.00 mU/L Final     T4 Free   Date Value Ref Range Status   07/10/2021 0.99 0.76 - 1.46 ng/dL Final     Free T4   Date Value Ref Range Status   10/07/2023 1.31 0.90 - 1.70 ng/dL Final      Imaging:  None reviewed    Physical Exam:    /80 (BP Location: Left arm, Patient Position: Sitting, Cuff Size: Adult Regular)   Pulse 80   Wt 79.4 kg (175 lb)   LMP 10/08/2023 (Exact Date)   SpO2 97%   BMI 29.57 kg/m       Physical Exam  Constitutional:       Appearance: Normal appearance.   HENT:      Head: Normocephalic and atraumatic.   Eyes:      Comments: Subtle eye lid swelling and some uncoordinated eye movement   Musculoskeletal:      Cervical back: Normal range of motion.   Neurological:      General: No focal deficit present.      Mental Status: She is alert.   Psychiatric:         Mood and Affect: Mood normal.         Behavior: Behavior normal.        ROS: Negative unless noted in HPI    Answers submitted by the patient for this visit:  Symptoms you have experienced in the last 30 days (Submitted on 10/9/2023)  General Symptoms: No  Skin Symptoms: No  HENT Symptoms: No  EYE SYMPTOMS: Yes  HEART SYMPTOMS: Yes  LUNG SYMPTOMS: No  INTESTINAL SYMPTOMS: Yes  URINARY SYMPTOMS: No  GYNECOLOGIC SYMPTOMS: No  BREAST SYMPTOMS: No  SKELETAL SYMPTOMS: Yes  BLOOD SYMPTOMS: No  NERVOUS SYSTEM SYMPTOMS: No  MENTAL HEALTH SYMPTOMS: No  Please answer the questions below to tell us what conditions you are experiencing: (Submitted on 10/9/2023)  Eye pain: Yes  Eye irritation: Yes  Please answer the questions below to tell us what condition  you are experiencing: (Submitted on 10/9/2023)  Light-headedness: Yes  Please answer the questions below to tell us what conditions you are experiencing: (Submitted on 10/9/2023)  Constipation: Yes  Please answer the questions below to tell us what condition you are experiencing: (Submitted on 10/9/2023)  Back pain: Yes      Again, thank you for allowing me to participate in the care of your patient.        Sincerely,        Lashonda Vallecillo MD

## 2023-10-13 NOTE — PROGRESS NOTES
Endocrinology follow-up note      Reason for visit/consult: Hyperthyroidism, Graves disease     Assessment and Plan  Maren Winchester is a 23 year old female who presents today for follow-up of hyperthyroidism. Patient's mom is with her at her appointment today.      # Grave' disease - TSI, TSH receptor Ab positive. Diagnosed Graves disease in August 2019.     TSH and FT4 all within normal range on dose of methimazole 2.5 mg daily. Discussed with the patient that their new eye symptoms could be due to Graves', but from a medication stand point her thyroid labs are within normal range and that it was good she had an opthalmology appointment for work-up of eye issues. Discussed in the future to potentially pursue total thyroidectomy, as we have her thyroid as under control as we can from a medication stand point and so to prevent further Graves' disease issues such as eye manifestation, there is some data that thyroidectomy can prevent some of these problems.     - continue methimazole 2.5 mg daily for now  -referral to surgeon for discussion about total thyroidectomy  -Follow-up appointment in one year     # Grave's ophthalmopathy evaluation  She will be seen by Dr. Borrego.     # Dizziness/fatigue     # menstrual cycle  On BCP every 3 month        RTC in 6 month     Lisy Cagle MS3  Medical Student    Attending tie-in note  I saw the patient with Gurjit COON3 and directly examined patient and discussed. Agree above note and plan.       30 minutes spent on the date of the encounter doing chart review, history and exam, documentation and further activities as noted above.    Lashonda Vallecillo MD  Staff Physician  Endocrinology and Metabolism  [facility]  License: [id number]  Pager: [phone]       Interval History as of 10/13/2023: Patient reports overall doing ok on her current dose of methimazole for symptom management, still has some symptoms today of dizziness and headache. PCP had concerns  for eye problems and was referred to the [facility] opthalmology for follow-up and appointment is next week. No rashes of fevers.   Interval History as of 12/9/2022 : Patient has been doing ok, compliant to methimazole, feeling dizzy episodes recently, BP was ok 120s.  .  Interval History as of 6/17/2022 : Patient has been doing well. Medication compliance: excellent   . New event includes: no pertinent medical event noted  .  Interval History as of 7/16/2021 : Patient has been doing well, but slight puffy eye. Occasional palpitation? Medication compliance excellent  On methimazole 2.5 mg Mon/Wed/Fri for 1 year.  Interval History as of 1/15/2021 : Patient has been doing well. Last seen 6 month ago . Medication compliance excellent  . 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  Interval History as of 7/24/2020 : Patient has been doing well. Last seen 7 month ago . Medication compliance excellent. Taking methimazole 1 year and now (current dose methimazole 5 mg)  euthryoid. Rather some symptoms of hypothyroidism (constipation, weight gain, fatigue), we will reduce the dose.  Interval History as of 1/24/2020 : Patient has been doing well.  Medication compliance excellent  . New event includes : occasional HA, no N/V. No palpitation. eue symptoms getting better. Enjoying working [employer].   Interval History as of 10/18/2019 Diagnosed in August 2019 currently she is on methimazole 5 mg and propranolol 2 0 mg twice daily.  Her parents are taking care of her medication and that she is compliant.  She still mentioned fatigue, insomnia, however by examining her today her tachycardia resolved and she had a slight swelling to the size of goiter.  TSH is still suppressed however free T4 became normalized.   HPI: A 17 yo female here for the consultation of her hyperthyroidism.  Patient came with her parents today.      Patient mentioned for the past 6 months she started to have mild  dysphagia when she eats breakfast and some sore throat.  Also excessive fatigue, insomnia.  She also started to have eye itchiness past several months, she went to ophthalmologist her vision was okay.   She gained 5 pounds since April 2019 she is hungry was that time.   She usually has constipation using the laxative however she started to have some diarrhea.   Her mood tends to be more depressed recently and also increase anxiety level according to the parents.   Patient also has family history of thyroid condition in her maternal side.  Mother : hemithyroidectomy. Maternal aunt: goiter  maternal grandmother: DM        Medications:  Current Outpatient Medications   Medication    cholecalciferol (VITAMIN D3) 25 mcg (1000 units) capsule    FLUoxetine (PROZAC) 20 MG capsule    JULEBER 0.15-30 MG-MCG tablet    methimazole (TAPAZOLE) 5 MG tablet    Multiple Vitamins-Minerals (MULTIVITAL PO)    vitamin C (ASCORBIC ACID) 500 MG tablet    polyethylene glycol (MIRALAX) 17 GM/Dose powder     No current facility-administered medications for this visit.        Allergies:   No Known Allergies       PAST MEDICAL HISTORY:   Past Medical History:   Diagnosis Date    ADHD (attention deficit hyperactivity disorder)     [place] diagnosis    Autism     autism spectrum disorder    Mental retardation     [place] diagnosis       PAST SURGICAL HISTORY: No past surgical history on file.    FAMILY HISTORY:   Family History   Problem Relation Age of Onset    Asthma No family hx of     Cerebrovascular Disease No family hx of     Depression No family hx of     Obesity No family hx of    Mother : hemithyroidectomy. Maternal aunt: goiter  maternal grandmother: DM    SOCIAL HISTORY:   Social History     Tobacco Use    Smoking status: Never    Smokeless tobacco: Never   Substance Use Topics    Alcohol use: No     Alcohol/week: 0.0 standard drinks of alcohol      Works at [employer] 2-3 days a week and enjoys her work     Labs:  TSH   Date Value Ref Range  Status   10/07/2023 1.69 0.30 - 4.20 uIU/mL Final   12/11/2022 1.42 0.40 - 4.00 mU/L Final   07/10/2021 1.62 0.40 - 4.00 mU/L Final     T4 Free   Date Value Ref Range Status   07/10/2021 0.99 0.76 - 1.46 ng/dL Final     Free T4   Date Value Ref Range Status   10/07/2023 1.31 0.90 - 1.70 ng/dL Final      Imaging:  None reviewed    Physical Exam:    /80 (BP Location: Left arm, Patient Position: Sitting, Cuff Size: Adult Regular)   Pulse 80   Wt 79.4 kg (175 lb)   LMP 10/08/2023 (Exact Date)   SpO2 97%   BMI 29.57 kg/m       Physical Exam  Constitutional:       Appearance: Normal appearance.   HENT:      Head: Normocephalic and atraumatic.   Eyes:      Comments: Subtle eye lid swelling and some uncoordinated eye movement   Musculoskeletal:      Cervical back: Normal range of motion.   Neurological:      General: No focal deficit present.      Mental Status: She is alert.   Psychiatric:         Mood and Affect: Mood normal.         Behavior: Behavior normal.        ROS: Negative unless noted in HPI    Answers submitted by the patient for this visit:  Symptoms you have experienced in the last 30 days (Submitted on 10/9/2023)  General Symptoms: No  Skin Symptoms: No  HENT Symptoms: No  EYE SYMPTOMS: Yes  HEART SYMPTOMS: Yes  LUNG SYMPTOMS: No  INTESTINAL SYMPTOMS: Yes  URINARY SYMPTOMS: No  GYNECOLOGIC SYMPTOMS: No  BREAST SYMPTOMS: No  SKELETAL SYMPTOMS: Yes  BLOOD SYMPTOMS: No  NERVOUS SYSTEM SYMPTOMS: No  MENTAL HEALTH SYMPTOMS: No  Please answer the questions below to tell us what conditions you are experiencing: (Submitted on 10/9/2023)  Eye pain: Yes  Eye irritation: Yes  Please answer the questions below to tell us what condition you are experiencing: (Submitted on 10/9/2023)  Light-headedness: Yes  Please answer the questions below to tell us what conditions you are experiencing: (Submitted on 10/9/2023)  Constipation: Yes  Please answer the questions below to tell us what condition you are  experiencing: (Submitted on 10/9/2023)  Back pain: Yes

## 2023-10-13 NOTE — NURSING NOTE
Maren Winchester's goals for this visit include:   Chief Complaint   Patient presents with    Follow Up    Thyroid Problem     Graves     She requests these members of her care team be copied on today's visit information: Yes    PCP: Trent Coley    Referring Provider:  No referring provider defined for this encounter.    /80 (BP Location: Left arm, Patient Position: Sitting, Cuff Size: Adult Regular)   Pulse 80   Wt 79.4 kg (175 lb)   LMP 10/08/2023 (Exact Date)   SpO2 97%   BMI 29.57 kg/m      Do you need any medication refills at today's visit? Not sure here to discuss    Sherice Waterman, MANJIT  Adult Endocrinology   [facility]

## 2023-10-16 NOTE — PROGRESS NOTES
1. Poorly controlled intermittent exotropia     Comitant strabismus pattern, long history of worsening control, and full motility exclude any serious etiologies for this strabismus which appears to be decompensation of a longstanding strabismus.    Discussed the risks, benefits, and alternatives of strabismus surgery. Plan to proceed with  surgery in the right eye to allow her fusion and improve nearly constant binocular diplopia that she currently suffers.    Maren Winchester is a pleasant 23 year old White female who presents to my neuro-ophthalmology clinic today having been referred by Dr. Arambula for exotropia.    HPI:    Patient has a history of Graves' disease diagnosed in August of 2019. Last thyroid labs on 10/7/23 at visit with endocrinology. On 2.5mg methimazole daily, has not undergone total thyroidectomy.  Seen for her yearly eye exam on 10/11/23 by Dr. Arambula where she was complaining of increase in headaches.     Has not had as much dizziness since the visit with Dr. Arambula but still has been having headaches. Per mother, has been having these headaches for the past 3 years. Describes the headache as a dull aching sensation all around her head. Comes on when she doesn't get enough sleep or if there is too much going on around her. Uses ibuprofen to make it go away. Happens multiple times a week. Mother has a history of migraines. Unable to determine if photophobia or sensitivity to sound occurs during these episodes. Endorses double vision that is worse with distance, mother states that she has noticed her eyes not being aligned since she was young, no history of patching or strabismus surgery.    Complaining of sharp eye pain that feels like its behind the eyes. States that it happens a couple times a week, does not seem to coincide with her headaches and happens randomly. Nothing makes it worse and the only thing that makes it better is ibuprofen. Has no other vision changes or eye  complaints.    Has not had any MRI or CT scans.     Independent historians:  Patient's mother    Review of outside testing:    10/7/23 TSH, T3, Free T4 all wnl.    12/04/23 TSI: 1.8 (elevated)    Review of outside clinical notes:    10/11/2023 -- Visit with Dr. Arambula      Past medical history:    Patient Active Problem List   Diagnosis    Cognitive impairment    Menorrhagia    Gross motor delay    Constipation, unspecified constipation type    VANNESSA (generalized anxiety disorder)    Chronic rhinitis    Autism spectrum disorder    Low TSH level    Fatigue, unspecified type    Hyperthyroidism       Patient has a current medication list which includes the following prescription(s): cholecalciferol, fluoxetine, juleber, methimazole, multiple vitamins-minerals, polyethylene glycol, and vitamin c..     Family history / social history:  Patient has family history of diabetes and migraines.    Patient  reports that she has never smoked. She has never used smokeless tobacco. She reports that she does not drink alcohol and does not use drugs.     Exam:  Visual acuity 20/20 in both eyes (best corrected).  Color vision 11/11 right eye and 11/11 left eye.  Pupils equal and reactive to light without afferent pupillary defect.  Intraocular pressure 10 right eye and 12 left eye.  Anterior segment exam was unremarkable as was the dilated fundus exam.    Tests ordered and interpreted today:  Sensorimotor exam today shows full motility in both eyes. There was a concomitant pattern poorly controlled intermittent exotropia (prefers fixation left eye) measuring 30-40 prism diopters.       We discussed in detail the risks, benefits, and alternatives of eye muscle correction surgery including the very rare risk of death or serious morbidity from a general anesthesia complication and the rare risk of severe vision loss in the operative eye(s) secondary to retinal detachment or endophthalmitis.  We discussed more likely sub-optimal outcomes  including the unanticipated need for additional strabismus surgery.  Finally the patient was aware that prisms glasses may be required to optimize single vision following surgery.    After a thorough discussion of these risks, the patient decided to proceed with strabismus surgery.  The surgical plan is as follows:     1. Eye muscle correction, right eye     Resection / recession procedure right eye     Follow-up 1 week after strabismus surgery.    Plan to operate at: ASC  Prism free glasses for adjustment: Non adjustable    The patient has consented verbally today that they would like for my team to contact the following person for strabismus surgery scheduling purposes: patient is autistic. Patient's Mom is her guardian.  Speak with Mom to plan surgery    No blood thinners      45 minutes were spent on the date of the encounter by me doing chart review, history and exam, documentation, and further activities as noted above    Precharting:  Cristal Hay MD, PGY3  Ophthalmology Resident  [facility]    Jason Justice, [address]    Complete documentation of historical and exam elements from today's encounter can be found in the full encounter summary report (not reduplicated in this progress note).  I personally obtained the chief complaint(s) and history of present illness.  I confirmed and edited as necessary the review of systems, past medical/surgical history, family history, social history, and examination findings as documented by others; and I examined the patient myself.  I personally reviewed the relevant tests, images, and reports as documented above.  I formulated and edited as necessary the assessment and plan and discussed the findings and management plan with the patient and family.  I personally reviewed the ophthalmic test(s) associated with this encounter, agree with the interpretation(s) as documented by the resident/fellow, and have edited the corresponding report(s)  as necessary.     A medical student was also involved in the care of the patient today. I was present with the medical student who participated in the service and in the documentation of the note. I have  verified the history and personally performed the physical exam and medical decision making. I extensively reviewed and edited when necessary the assessment and plan. I agree with the assessment and plan of care as documented in the note    Geremias Borrego MD

## 2023-10-17 ENCOUNTER — OFFICE VISIT (OUTPATIENT)
Dept: OPHTHALMOLOGY | Facility: CLINIC | Age: 23
End: 2023-10-17
Attending: OPHTHALMOLOGY
Payer: MEDICAID

## 2023-10-17 DIAGNOSIS — H50.331 INTERMITTENT EXOTROPIA OF RIGHT EYE: ICD-10-CM

## 2023-10-17 DIAGNOSIS — H53.10 SUBJECTIVE VISUAL DISTURBANCE: ICD-10-CM

## 2023-10-17 DIAGNOSIS — H53.2 DOUBLE VISION: Primary | ICD-10-CM

## 2023-10-17 PROCEDURE — 92060 SENSORIMOTOR EXAMINATION: CPT | Performed by: OPHTHALMOLOGY

## 2023-10-17 PROCEDURE — G0463 HOSPITAL OUTPT CLINIC VISIT: HCPCS | Performed by: OPHTHALMOLOGY

## 2023-10-17 PROCEDURE — 99204 OFFICE O/P NEW MOD 45 MIN: CPT | Mod: GC | Performed by: OPHTHALMOLOGY

## 2023-10-17 ASSESSMENT — VISUAL ACUITY
METHOD: SNELLEN - LINEAR
OS_SC: 20/20
OD_PH_SC: 20/25
OD_SC: 20/50
OS_SC+: -1

## 2023-10-17 ASSESSMENT — TONOMETRY
OD_IOP_MMHG: 10
IOP_METHOD: ICARE SINGLE
OS_IOP_MMHG: 12

## 2023-10-17 ASSESSMENT — CONF VISUAL FIELD: METHOD: COUNTING FINGERS

## 2023-10-17 ASSESSMENT — EXTERNAL EXAM - LEFT EYE: OS_EXAM: NORMAL

## 2023-10-17 ASSESSMENT — REFRACTION_MANIFEST
OD_CYLINDER: SPHERE
OD_SPHERE: -1.00

## 2023-10-17 ASSESSMENT — CUP TO DISC RATIO
OD_RATIO: 0.3
OS_RATIO: 0.3

## 2023-10-17 ASSESSMENT — SLIT LAMP EXAM - LIDS
COMMENTS: NORMAL
COMMENTS: NORMAL

## 2023-10-17 ASSESSMENT — EXTERNAL EXAM - RIGHT EYE: OD_EXAM: NORMAL

## 2023-10-17 NOTE — NURSING NOTE
"Chief Complaint(s) and History of Present Illness(es)       Exotropia Evaluation              Laterality: both eyes    Associated symptoms: eye pain, blurred vision, headaches and dizziness    Treatments tried: glasses    Comments: Referred by Dr. Arambula in [place] 2 months ago for an \"issue with the eye muscles.\" Noticed a large angle exotropia for the first time in August 2023. No strabismus before. Patient complains of eye pain of both eyes. Headaches a couple times a week in the front of the forehead and temples. Started getting dizzy 2 weeks ago and had one occurrence of this. Patient says it is blurry looking far away and up close. Mom does not notice eye drifting. Patient hasn't worn glasses since 2012.               Graves Thyrotoxic Exoph              Associated symptoms: eye pain, blurred vision, headaches and dizziness    Comments:  Grave' disease - TSI, TSH receptor Ab positive. Diagnosed Graves disease in August 2019.   methimazole 2.5 mg daily              Comments    Mom states that patient doesn't go to bed till 3am and wants to sleep during the day started a couple years ago.    Cognitive impairment    Menorrhagia    Gross motor delay    Constipation, unspecified constipation type    VANNESSA (generalized anxiety disorder)    Chronic rhinitis    Autism spectrum disorder    Low TSH level    Fatigue, unspecified type    Hyperthyroidism    12/2022 TSI 1.8    10/7/23  TSH  0.30 - 4.20 uIU/mL 1.69  Free T4  0.90 - 1.70 ng/dL 1.31  T3 Total  85 - 202 ng/dL 167    Methimazole last dose 7:35am this morning 2.5 mg   Inf; mom and patient                  "

## 2023-10-17 NOTE — LETTER
2023    RE: Maren Winchester  : 2000  MRN: [id number]    Dear Dr. Ryan    Thank you for referring your patient, Maren Winchester, to my neuro-ophthalmology clinic recently.  After a thorough neuro-ophthalmic history and examination, I came to the following conclusions:   1. Poorly controlled intermittent exotropia.    Comitant strabismus pattern, long history of worsening control, and full motility exclude any serious etiologies for this strabismus which appears to be decompensation of a longstanding strabismus.    Discussed the risks, benefits, and alternatives of strabismus surgery. Plan to proceed with  surgery in the right eye to allow her fusion and improve nearly constant binocular diplopia that she currently suffers.    Maren Winchester is a pleasant 23 year old White female who presents to my neuro-ophthalmology clinic today having been referred by Dr. Arambula for exotropia.    HPI:    Patient has a history of Graves' disease diagnosed in 2019. Last thyroid labs on 10/7/23 at visit with endocrinology. On 2.5mg methimazole daily, has not undergone total thyroidectomy.  Seen for her yearly eye exam on 10/11/23 by Dr. Arambula where she was complaining of increase in headaches.     Has not had as much dizziness since the visit with Dr. Arambula but still has been having headaches. Per mother, has been having these headaches for the past 3 years. Describes the headache as a dull aching sensation all around her head. Comes on when she doesn't get enough sleep or if there is too much going on around her. Uses ibuprofen to make it go away. Happens multiple times a week. Mother has a history of migraines. Unable to determine if photophobia or sensitivity to sound occurs during these episodes. Endorses double vision that is worse with distance, mother states that she has noticed her eyes not being aligned since she was young, no history of patching or strabismus  surgery.    Complaining of sharp eye pain that feels like its behind the eyes. States that it happens a couple times a week, does not seem to coincide with her headaches and happens randomly. Nothing makes it worse and the only thing that makes it better is ibuprofen. Has no other vision changes or eye complaints.    Has not had any MRI or CT scans.     Independent historians: Patient's mother    Review of outside testing:    10/7/23 TSH, T3, Free T4 all wnl.    12/04/23 TSI: 1.8 (elevated)    Review of outside clinical notes:    10/11/2023 -- Visit with Dr. Arambula      Past medical history:    Patient Active Problem List   Diagnosis    Cognitive impairment    Menorrhagia    Gross motor delay    Constipation, unspecified constipation type    VANNESSA (generalized anxiety disorder)    Chronic rhinitis    Autism spectrum disorder    Low TSH level    Fatigue, unspecified type    Hyperthyroidism       Patient has a current medication list which includes the following prescription(s): cholecalciferol, fluoxetine, juleber, methimazole, multiple vitamins-minerals, polyethylene glycol, and vitamin c..     Family history / social history:  Patient has family history of diabetes and migraines.    Patient  reports that she has never smoked. She has never used smokeless tobacco. She reports that she does not drink alcohol and does not use drugs.     Exam:  Visual acuity 20/20 in both eyes (best corrected).  Color vision 11/11 right eye and 11/11 left eye.  Pupils equal and reactive to light without afferent pupillary defect.  Intraocular pressure 10 right eye and 12 left eye.  Anterior segment exam was unremarkable as was the dilated fundus exam.    Tests ordered and interpreted today:  Sensorimotor exam today shows full motility in both eyes. There was a concomitant pattern poorly controlled intermittent exotropia (prefers fixation left eye) measuring 30-40 prism diopters.       We discussed in detail the risks, benefits, and  alternatives of eye muscle correction surgery including the very rare risk of death or serious morbidity from a general anesthesia complication and the rare risk of severe vision loss in the operative eye(s) secondary to retinal detachment or endophthalmitis.  We discussed more likely sub-optimal outcomes including the unanticipated need for additional strabismus surgery.  Finally the patient was aware that prisms glasses may be required to optimize single vision following surgery.    After a thorough discussion of these risks, the patient decided to proceed with strabismus surgery.  The surgical plan is as follows:     1. Eye muscle correction, right eye     Resection / recession procedure right eye     Follow-up 1 week after strabismus surgery.    Plan to operate at: ASC  Prism free glasses for adjustment: Non adjustable    The patient has consented verbally today that they would like for my team to contact the following person for strabismus surgery scheduling purposes: patient is autistic. Patient's Mom is her guardian.  Speak with Mom to plan surgery    No blood thinners    Again, thank you for trusting me with the care of your patient.  For further exam details, please feel free to contact our office for additional records.  If you wish to contact me regarding this patient please email me at [email or web address] or give my clinic a call to arrange a phone conversation.      Sincerely,    Geremias Borrego MD  , Neuro-Ophthalmology and Adult Strabismus Surgery  [facility] in Neuro-Ophthalmology  Department of Ophthalmology and Visual Neurosciences  [facility]    DX: intermittent exotropia, strabismus surgery

## 2023-10-17 NOTE — NURSING NOTE
"Chief Complaint(s) and History of Present Illness(es)       Exotropia Evaluation              Laterality: both eyes    Associated symptoms: eye pain, blurred vision, headaches and dizziness    Treatments tried: glasses    Comments: Referred by Dr. Arambula in [place] 2 months ago for an \"issue with the eye muscles.\" Patient complains of eye pain of both eyes. Headaches a couple times a week in the front of the forehead and temples. Started getting dizzy 2 weeks ago and had one occurrence of this. Patient says it is blurry looking far away and up close. Mom does not notice eye drifting. Patient hasn't worn glasses since 2012.               Graves Thyrotoxic Exoph              Associated symptoms: eye pain, blurred vision, headaches and dizziness    Comments:  Grave' disease - TSI, TSH receptor Ab positive. Diagnosed Graves disease in August 2019.   methimazole 2.5 mg daily              Comments    Mom states that patient doesn't go to bed till 3am and wants to sleep during the day started a couple years ago.    Cognitive impairment    Menorrhagia    Gross motor delay    Constipation, unspecified constipation type    VANNESSA (generalized anxiety disorder)    Chronic rhinitis    Autism spectrum disorder    Low TSH level    Fatigue, unspecified type    Hyperthyroidism    12/2022 TSI 1.8    10/7/23  TSH  0.30 - 4.20 uIU/mL 1.69  Free T4  0.90 - 1.70 ng/dL 1.31  T3 Total  85 - 202 ng/dL 167    Methimazole last dose 7:35am this morning 2.5 mg   Inf; mom and patient                  "

## 2023-11-08 ENCOUNTER — MYC MEDICAL ADVICE (OUTPATIENT)
Dept: FAMILY MEDICINE | Facility: CLINIC | Age: 23
End: 2023-11-08
Payer: MEDICAID

## 2023-11-08 NOTE — TELEPHONE ENCOUNTER
Mother trying to set up Pre-Op. Has one for 11/22 but patient is unable to make that one.     Routed to [facility] to get that scheduled.

## 2023-11-08 NOTE — TELEPHONE ENCOUNTER
Routing to provider to advise when able to work patient in after 20th for Pre-op: Surgery scheduled for 12/18.   Doris Troy MA

## 2023-11-14 ENCOUNTER — TELEPHONE (OUTPATIENT)
Dept: OPHTHALMOLOGY | Facility: CLINIC | Age: 23
End: 2023-11-14
Payer: MEDICAID

## 2023-11-14 NOTE — TELEPHONE ENCOUNTER
11/14/2023 12:07PM Offered to reschedule Maren's 12/18 surgery to 11/20. 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 She does not wish to reschedule.    11/14/2023 11/14/2023 [facility] requesting a call back to [phone].

## 2023-12-08 ENCOUNTER — OFFICE VISIT (OUTPATIENT)
Dept: FAMILY MEDICINE | Facility: CLINIC | Age: 23
End: 2023-12-08
Payer: MEDICAID

## 2023-12-08 VITALS
WEIGHT: 178.4 LBS | RESPIRATION RATE: 20 BRPM | BODY MASS INDEX: 30.15 KG/M2 | OXYGEN SATURATION: 97 % | SYSTOLIC BLOOD PRESSURE: 118 MMHG | DIASTOLIC BLOOD PRESSURE: 78 MMHG | HEART RATE: 83 BPM | TEMPERATURE: 98.3 F

## 2023-12-08 DIAGNOSIS — H53.2 DOUBLE VISION: ICD-10-CM

## 2023-12-08 DIAGNOSIS — F84.0 AUTISM SPECTRUM DISORDER: ICD-10-CM

## 2023-12-08 DIAGNOSIS — Z01.818 PREOP GENERAL PHYSICAL EXAM: Primary | ICD-10-CM

## 2023-12-08 PROCEDURE — 99214 OFFICE O/P EST MOD 30 MIN: CPT | Mod: 25 | Performed by: FAMILY MEDICINE

## 2023-12-08 PROCEDURE — 90686 IIV4 VACC NO PRSV 0.5 ML IM: CPT | Performed by: FAMILY MEDICINE

## 2023-12-08 PROCEDURE — 90471 IMMUNIZATION ADMIN: CPT | Performed by: FAMILY MEDICINE

## 2023-12-08 ASSESSMENT — PAIN SCALES - GENERAL: PAINLEVEL: MILD PAIN (2)

## 2023-12-08 NOTE — PROGRESS NOTES
[address] [address]  Phone: [phone]  Fax: [phone]  Primary Provider: Antoinette Coley  Pre-op Performing Provider: ANTOINETTE COLEY      PREOPERATIVE EVALUATION:  Today's date: 12/8/2023    Maren is a 23 year old, presenting for the following:  Pre-Op Exam        12/8/2023    11:35 AM   Additional Questions   Roomed by Ira   Accompanied by Jus Zamora       Surgical Information:  Surgery/Procedure: right eye muscle correction  Surgery Location:  [facility]   Surgeon: Dr. Borrego  Surgery Date: 12-18-23  Time of Surgery: ?  Where patient plans to recover: At home with family  Fax number for surgical facility: Note does not need to be faxed, will be available electronically in Epic.    Assessment & Plan     The proposed surgical procedure is considered LOW risk.      ICD-10-CM    1. Preop general physical exam  Z01.818 INFLUENZA VACCINE IM > 6 MONTHS VALENT IIV4 (AFLURIA/FLUZONE)      2. Autism spectrum disorder  F84.0       3. Double vision  H53.2            Autism. Worried about surgery, but seems to understand its purpose and risks. Parents will present and able to consent.       Possible Sleep Apnea:            - No identified additional risk factors other than previously addressed        RECOMMENDATION:  APPROVAL GIVEN to proceed with proposed procedure, without further diagnostic evaluation.            Subjective       HPI related to upcoming procedure:     Upcoming eye surgery  Feels well  Can get up a single flight of stairs without dyspnea. Estimated METS > 4.          12/7/2023     4:07 PM   Preop Questions   1. Have you ever had a heart attack or stroke? No   2. Have you ever had surgery on your heart or blood vessels, such as a stent placement, a coronary artery bypass, or surgery on an artery in your head, neck, heart, or legs? No   3. Do you have chest pain with activity? No   4. Do you have a  history of  heart failure? No   5. Do you currently have a cold, bronchitis or symptoms of other infection? No   6. Do you have a cough, shortness of breath, or wheezing? No   7. Do you or anyone in your family have previous history of blood clots? No   8. Do you or does anyone in your family have a serious bleeding problem such as prolonged bleeding following surgeries or cuts? No   9. Have you ever had problems with anemia or been told to take iron pills? No   10. Have you had any abnormal blood loss such as black, tarry or bloody stools, or abnormal vaginal bleeding? No   11. Have you ever had a blood transfusion? No   12. Are you willing to have a blood transfusion if it is medically needed before, during, or after your surgery? Yes   13. Have you or any of your relatives ever had problems with anesthesia? No   14. Do you have sleep apnea, excessive snoring or daytime drowsiness? YES -    14a. Do you have a CPAP machine? No   15. Do you have any artifical heart valves or other implanted medical devices like a pacemaker, defibrillator, or continuous glucose monitor? No   16. Do you have artificial joints? No   17. Are you allergic to latex? No   18. Is there any chance that you may be pregnant? No       Health Care Directive:  Patient has a Health Care Directive on file      Preoperative Review of :            Review of Systems  CONSTITUTIONAL: NEGATIVE for fever, chills, change in weight  ENT/MOUTH: NEGATIVE for ear, mouth and throat problems  RESP: NEGATIVE for significant cough or SOB  CV: NEGATIVE for chest pain, palpitations or peripheral edema    Patient Active Problem List    Diagnosis Date Noted    Double vision 10/17/2023     Priority: Medium    Hyperthyroidism 08/26/2019     Priority: Medium    Fatigue, unspecified type 04/11/2019     Priority: Medium    Low TSH level 04/04/2019     Priority: Medium    Autism spectrum disorder 01/16/2018     Priority: Medium    Chronic rhinitis 11/12/2016      Priority: Medium    VANNESSA (generalized anxiety disorder) 07/09/2016     Priority: Medium    Constipation, unspecified constipation type 12/10/2015     Priority: Medium    Gross motor delay 04/06/2015     Priority: Medium    Cognitive impairment 07/06/2012     Priority: Medium    Menorrhagia 07/06/2012     Priority: Medium      Past Medical History:   Diagnosis Date    ADHD (attention deficit hyperactivity disorder)     [place] diagnosis    Autism     autism spectrum disorder    Mental retardation     [place] diagnosis     No past surgical history on file.  Current Outpatient Medications   Medication Sig Dispense Refill    cholecalciferol (VITAMIN D3) 25 mcg (1000 units) capsule Take 1 capsule by mouth daily      JULEBER 0.15-30 MG-MCG tablet TAKE ONE TABLET BY MOUTH DAILY, SKIP PLACEBO WEEK EXCEPT FOR EVERY 3RD MONTH 112 tablet 3    methimazole (TAPAZOLE) 5 MG tablet Take 0.5 tablets (2.5 mg) by mouth daily 45 tablet 3    Multiple Vitamins-Minerals (MULTIVITAL PO) Take  by mouth.      polyethylene glycol (MIRALAX) 17 GM/Dose powder Take 17 g (1 capful) by mouth as needed 850 g 3    vitamin C (ASCORBIC ACID) 500 MG tablet Take 500 mg by mouth daily      FLUoxetine (PROZAC) 20 MG capsule TAKE 1 CAPSULE (20 MG) BY MOUTH DAILY (STOP SERTRALINE) 90 capsule 1       No Known Allergies     Social History     Tobacco Use    Smoking status: Never    Smokeless tobacco: Never   Substance Use Topics    Alcohol use: Never     0  History   Drug Use Unknown         Objective     /78   Pulse 83   Temp 98.3  F (36.8  C) (Temporal)   Resp 20   Wt 80.9 kg (178 lb 6.4 oz)   LMP 10/08/2023 (Exact Date)   SpO2 97%   BMI 30.15 kg/m      Physical Exam  GENERAL APPEARANCE: healthy, alert and no distress  HENT: ear canals and TM's normal and nose and mouth without ulcers or lesions  RESP: lungs clear to auscultation - no rales, rhonchi or wheezes  CV: regular rate and rhythm, normal S1 S2, no S3 or S4 and no murmur, click or rub  "  ABDOMEN: soft, nontender, no HSM or masses and bowel sounds normal  NEURO: Normal strength and tone, sensory exam grossly normal, mentation intact and speech normal    No results for input(s): \"HGB\", \"PLT\", \"INR\", \"NA\", \"POTASSIUM\", \"CR\", \"A1C\" in the last 24477 hours.     Diagnostics:  No labs were ordered during this visit.   No EKG required for low risk surgery (cataract, skin procedure, breast biopsy, etc).    Revised Cardiac Risk Index (RCRI):  The patient has the following serious cardiovascular risks for perioperative complications:   - No serious cardiac risks = 0 points     RCRI Interpretation: 0 points: Class I (very low risk - 0.4% complication rate)         Signed Electronically by: Trent Coley MD  Copy of this evaluation report is provided to requesting physician.      "

## 2023-12-11 DIAGNOSIS — F41.1 GAD (GENERALIZED ANXIETY DISORDER): ICD-10-CM

## 2023-12-15 ENCOUNTER — ANESTHESIA EVENT (OUTPATIENT)
Dept: SURGERY | Facility: AMBULATORY SURGERY CENTER | Age: 23
End: 2023-12-15
Payer: MEDICAID

## 2023-12-15 RX ORDER — ONDANSETRON 2 MG/ML
4 INJECTION INTRAMUSCULAR; INTRAVENOUS EVERY 30 MIN PRN
Status: CANCELLED | OUTPATIENT
Start: 2023-12-15

## 2023-12-15 RX ORDER — OXYCODONE HYDROCHLORIDE 5 MG/1
10 TABLET ORAL
Status: CANCELLED | OUTPATIENT
Start: 2023-12-15

## 2023-12-15 RX ORDER — OXYCODONE HYDROCHLORIDE 5 MG/1
5 TABLET ORAL
Status: CANCELLED | OUTPATIENT
Start: 2023-12-15

## 2023-12-15 RX ORDER — FENTANYL CITRATE 50 UG/ML
25 INJECTION, SOLUTION INTRAMUSCULAR; INTRAVENOUS
Status: CANCELLED | OUTPATIENT
Start: 2023-12-15

## 2023-12-15 RX ORDER — ONDANSETRON 4 MG/1
4 TABLET, ORALLY DISINTEGRATING ORAL EVERY 30 MIN PRN
Status: CANCELLED | OUTPATIENT
Start: 2023-12-15

## 2023-12-16 ENCOUNTER — NURSE TRIAGE (OUTPATIENT)
Dept: NURSING | Facility: CLINIC | Age: 23
End: 2023-12-16
Payer: MEDICAID

## 2023-12-16 NOTE — TELEPHONE ENCOUNTER
Mom (legal guardian) is the caller, consent to communicate on file.  Pt is to have surgery on 12/18/23 for eye surgery at [place], [place].  Mom wanting to know if pt will be canceled.  Mom has call out to Surgeon on Call to see if pt will be canceled for surgery and has not heard back.  On call repaged to Mom - Dr. Weiss @ 11:31 am.      Pt developed symptoms today, fever 100.3 today 12/16/23, symptoms started 12/15/23.  Pt is autistic and BMI > 30.  Mom is interested in Paxlovid for pt.      COVID Positive/Requesting COVID treatment    Patient is positive for COVID and requesting treatment options.    Date of positive COVID test (PCR or at home)? 12/16/23  Current COVID symptoms: fever or chills, headache, and congestion or runny nose  Date COVID symptoms began: 12/15/23    Message should be routed to clinic RN pool. Best phone number to use for call back: [phone]    Elizabeth Suggs RN  [facility]NA Nurse Advisor      Reason for Disposition   [1] HIGH RISK patient (e.g., weak immune system, age > 64 years, obesity with BMI 30 or higher, pregnant, chronic lung disease or other chronic medical condition) AND [2] COVID symptoms (e.g., cough, fever)  (Exceptions: Already seen by PCP and no new or worsening symptoms.)    Additional Information   Negative: SEVERE difficulty breathing (e.g., struggling for each breath, speaks in single words)   Negative: Difficult to awaken or acting confused (e.g., disoriented, slurred speech)   Negative: Bluish (or gray) lips or face now   Negative: Shock suspected (e.g., cold/pale/clammy skin, too weak to stand, low BP, rapid pulse)   Negative: Sounds like a life-threatening emergency to the triager   Negative: [1] Diagnosed or suspected COVID-19 AND [2] symptoms lasting 3 or more weeks   Negative: [1] COVID-19 exposure AND [2] no symptoms   Negative: COVID-19 vaccine reaction suspected (e.g., fever, headache, muscle aches) occurring 1 to 3 days after getting vaccine   Negative: COVID-19  vaccine, questions about   Negative: [1] Lives with someone known to have influenza (flu test positive) AND [2] flu-like symptoms (e.g., cough, runny nose, sore throat, SOB; with or without fever)   Negative: [1] Possible COVID-19 symptoms AND [2] triager concerned about severity of symptoms or other causes   Negative: COVID-19 and breastfeeding, questions about   Negative: SEVERE or constant chest pain or pressure  (Exception: Mild central chest pain, present only when coughing.)   Negative: MODERATE difficulty breathing (e.g., speaks in phrases, SOB even at rest, pulse 100-120)   Negative: [1] Headache AND [2] stiff neck (can't touch chin to chest)   Negative: Oxygen level (e.g., pulse oximetry) 90 percent or lower   Negative: Chest pain or pressure  (Exception: MILD central chest pain, present only when coughing.)   Negative: [1] Drinking very little AND [2] dehydration suspected (e.g., no urine > 12 hours, very dry mouth, very lightheaded)   Negative: Patient sounds very sick or weak to the triager   Negative: MILD difficulty breathing (e.g., minimal/no SOB at rest, SOB with walking, pulse <100)   Negative: Fever > 103 F (39.4 C)   Negative: [1] Fever > 101 F (38.3 C) AND [2] age > 60 years   Negative: [1] Fever > 100.0 F (37.8 C) AND [2] bedridden (e.g., CVA, chronic illness, recovering from surgery)   Negative: Oxygen level (e.g., pulse oximetry) 91 to 94 percent    Protocols used: Coronavirus (COVID-19) Diagnosed or [id number]

## 2023-12-17 NOTE — ANESTHESIA PREPROCEDURE EVALUATION
"Anesthesia Pre-Procedure Evaluation    Patient: Maren Winchester   MRN: [id number] : 2000        Procedure : Procedure(s):  Eye muscle correction, right eye.          Past Medical History:   Diagnosis Date     ADHD (attention deficit hyperactivity disorder)     [place] diagnosis     Autism     autism spectrum disorder     Mental retardation     [place] diagnosis      No past surgical history on file.   No Known Allergies   Social History     Tobacco Use     Smoking status: Never     Smokeless tobacco: Never   Substance Use Topics     Alcohol use: Never      Wt Readings from Last 1 Encounters:   23 80.9 kg (178 lb 6.4 oz)              OUTSIDE LABS:  CBC:   Lab Results   Component Value Date    WBC 7.1 2019    WBC 8.3 2019    HGB 13.8 2019    HGB 12.7 2019    HCT 42.3 2019    HCT 38.7 2019     2019     2019     BMP:   Lab Results   Component Value Date     2019     2017    POTASSIUM 4.0 2019    POTASSIUM 3.8 2017    CHLORIDE 107 2019    CHLORIDE 106 2017    CO2 23 2019    CO2 30 2017    BUN 11 2019    BUN 7 2017    CR 0.45 (L) 2019    CR 0.64 2017    GLC 86 2019    GLC 93 2017     COAGS:   Lab Results   Component Value Date    PTT 32 10/22/2012    INR 1.11 10/22/2012     POC: No results found for: \"BGM\", \"HCG\", \"HCGS\"  HEPATIC:   Lab Results   Component Value Date    ALBUMIN 3.0 (L) 2019    PROTTOTAL 7.6 2019    ALT 27 2019    AST 19 2019    ALKPHOS 87 2019    BILITOTAL 0.4 2019     OTHER:   Lab Results   Component Value Date    A1C 5.6 2015    MAIA 8.7 2019    LIPASE 107 2016    TSH 1.69 10/07/2023    T4 1.31 10/07/2023    T3 167 10/07/2023    SED 15 2016       Anesthesia Plan    ASA Status:  2      Anesthesia Type: General.     - Airway: LMA   Induction: Intravenous, Propofol.   Maintenance: " Balanced.        Consents         - Extended Intubation/Ventilatory Support Discussed: No.      - Patient is DNR/DNI Status: No    Use of blood products discussed: No .     Postoperative Care    Pain management: IV analgesics, Oral pain medications, Multi-modal analgesia.   PONV prophylaxis: Dexamethasone or Solumedrol, Ondansetron (or other 5HT-3), Background Propofol Infusion     Comments:               Quintin Hayes MD    I have reviewed the pertinent notes and labs in the chart from the past 30 days and (re)examined the patient.  Any updates or changes from those notes are reflected in this note.

## 2023-12-18 ENCOUNTER — ANESTHESIA (OUTPATIENT)
Dept: SURGERY | Facility: AMBULATORY SURGERY CENTER | Age: 23
End: 2023-12-18
Payer: MEDICAID

## 2023-12-18 ENCOUNTER — MYC MEDICAL ADVICE (OUTPATIENT)
Dept: OPHTHALMOLOGY | Facility: CLINIC | Age: 23
End: 2023-12-18
Payer: MEDICAID

## 2023-12-18 NOTE — TELEPHONE ENCOUNTER
12/18/2023 11:39AM Spoke with Gisel to reschedule Maren's 12/18/2023 canceled surgery. Surgery offered and scheduled for Dr. Borrego's next available surgery date of 2/19/2024. Reviewed the need for a new pre-op H&P after 1/20, PAN call 1-2 days before surgery to provide arrival and npo instructions and visitor policy.    Gisel states that Maren has complained a couple of times about dizziness, seeing red dots in her right eye and increased headaches. Gisel wonders if these complaints could be related to the need for the eye surgery with Dr. Borrego and would like a call back to discuss.

## 2023-12-19 NOTE — TELEPHONE ENCOUNTER
Called Gisel back to discuss that strabismus can cause headaches but that's not the only thing that can cause headaches. She could just have a primary headache disorder or migraines. She voiced understanding.     Sandra Zayas MD   Fellow, Neuro-Ophthalmology

## 2024-01-15 ENCOUNTER — ALLIED HEALTH/NURSE VISIT (OUTPATIENT)
Dept: FAMILY MEDICINE | Facility: CLINIC | Age: 24
End: 2024-01-15
Payer: MEDICAID

## 2024-01-15 DIAGNOSIS — Z23 ENCOUNTER FOR IMMUNIZATION: Primary | ICD-10-CM

## 2024-01-15 PROCEDURE — 90471 IMMUNIZATION ADMIN: CPT

## 2024-01-15 PROCEDURE — 99207 PR NO CHARGE NURSE ONLY: CPT

## 2024-01-15 PROCEDURE — 90651 9VHPV VACCINE 2/3 DOSE IM: CPT

## 2024-01-15 NOTE — PROGRESS NOTES
Prior to immunization administration, verified patients identity using patient s name and date of birth. Please see Immunization Activity for additional information.     Screening Questionnaire for Adult Immunization    Are you sick today?   No   Do you have allergies to medications, food, a vaccine component or latex?   No   Have you ever had a serious reaction after receiving a vaccination?   No   Do you have a long-term health problem with heart, lung, kidney, or metabolic disease (e.g., diabetes), asthma, a blood disorder, no spleen, complement component deficiency, a cochlear implant, or a spinal fluid leak?  Are you on long-term aspirin therapy?   No   Do you have cancer, leukemia, HIV/AIDS, or any other immune system problem?   No   Do you have a parent, brother, or sister with an immune system problem?   No   In the past 3 months, have you taken medications that affect  your immune system, such as prednisone, other steroids, or anticancer drugs; drugs for the treatment of rheumatoid arthritis, Crohn s disease, or psoriasis; or have you had radiation treatments?   No   Have you had a seizure, or a brain or other nervous system problem?   No   During the past year, have you received a transfusion of blood or blood    products, or been given immune (gamma) globulin or antiviral drug?   No   For women: Are you pregnant or is there a chance you could become       pregnant during the next month?   No   Have you received any vaccinations in the past 4 weeks?   No     Immunization questionnaire answers were all negative.    I have reviewed the following standing orders:   This patient is due and qualifies for the HPV vaccine.    Click here for HPV (Adult 15-45Y) Standing Order     I have reviewed the vaccines inclusion and exclusion criteria;No concerns regarding eligibility.       Patient instructed to remain in clinic for 15 minutes afterwards, and to report any adverse reactions.     Screening performed by  Ashly Valadez on 1/15/2024 at 9:36 AM.

## 2024-01-31 ENCOUNTER — OFFICE VISIT (OUTPATIENT)
Dept: FAMILY MEDICINE | Facility: CLINIC | Age: 24
End: 2024-01-31
Payer: MEDICAID

## 2024-01-31 VITALS
OXYGEN SATURATION: 97 % | WEIGHT: 179.6 LBS | DIASTOLIC BLOOD PRESSURE: 84 MMHG | HEART RATE: 89 BPM | HEIGHT: 65 IN | BODY MASS INDEX: 29.92 KG/M2 | TEMPERATURE: 98 F | SYSTOLIC BLOOD PRESSURE: 120 MMHG | RESPIRATION RATE: 18 BRPM

## 2024-01-31 DIAGNOSIS — H53.2 DOUBLE VISION: ICD-10-CM

## 2024-01-31 DIAGNOSIS — R41.89 COGNITIVE IMPAIRMENT: ICD-10-CM

## 2024-01-31 DIAGNOSIS — Z01.818 PREOP EXAMINATION: Primary | ICD-10-CM

## 2024-01-31 DIAGNOSIS — F84.0 AUTISM SPECTRUM DISORDER: ICD-10-CM

## 2024-01-31 PROCEDURE — 99214 OFFICE O/P EST MOD 30 MIN: CPT | Performed by: FAMILY MEDICINE

## 2024-01-31 ASSESSMENT — PAIN SCALES - GENERAL: PAINLEVEL: NO PAIN (0)

## 2024-01-31 NOTE — PROGRESS NOTES
Preoperative Evaluation  [address] [address]  Phone: [phone]  Fax: [phone]  Primary Provider: Antoinette Coley  Pre-op Performing Provider: ANTOINETTE COLEY    Jan 31, 2024       Maren is a 23 year old, presenting for the following:  Pre-Op Exam and Dizziness (Monday when got up in the morning experienced this- patient fell down. Has had this previously, but not recently. )        1/31/2024    10:04 AM   Additional Questions   Roomed by Lili MURPHY     Surgical Information  Surgery/Procedure: Eye muscle correction, right eye  Surgery Location: [facility]  Surgeon: Dr. Geremias Borrego  Surgery Date: 02/19/2024  Time of Surgery: 8:35AM  Where patient plans to recover: At home with family  Fax number for surgical facility: [phone]    Assessment & Plan     The proposed surgical procedure is considered LOW risk.      ICD-10-CM    1. Preop examination  Z01.818       2. Cognitive impairment  R41.89       3. Autism spectrum disorder  F84.0       4. Double vision  H53.2            Recent COVID, recovered  Recently lightheaded after diarrheal illness.  Suspect orthostasis.    Recommendation  APPROVAL GIVEN to proceed with proposed procedure, without further diagnostic evaluation.          Subjective       HPI related to upcoming procedure:     Having upcoming eye surgery  Fully recovered from covid          1/30/2024    10:50 AM   Preop Questions   1. Have you ever had a heart attack or stroke? No   2. Have you ever had surgery on your heart or blood vessels, such as a stent placement, a coronary artery bypass, or surgery on an artery in your head, neck, heart, or legs? No   3. Do you have chest pain with activity? No   4. Do you have a history of  heart failure? No   5. Do you currently have a cold, bronchitis or symptoms of other infection? YES -    6. Do you have a cough, shortness of  breath, or wheezing? No   7. Do you or anyone in your family have previous history of blood clots? No   8. Do you or does anyone in your family have a serious bleeding problem such as prolonged bleeding following surgeries or cuts? No   9. Have you ever had problems with anemia or been told to take iron pills? No   10. Have you had any abnormal blood loss such as black, tarry or bloody stools, or abnormal vaginal bleeding? No   11. Have you ever had a blood transfusion? No   12. Are you willing to have a blood transfusion if it is medically needed before, during, or after your surgery? Yes   13. Have you or any of your relatives ever had problems with anesthesia? No   14. Do you have sleep apnea, excessive snoring or daytime drowsiness? YES -    14a. Do you have a CPAP machine? No   15. Do you have any artifical heart valves or other implanted medical devices like a pacemaker, defibrillator, or continuous glucose monitor? No   16. Do you have artificial joints? No   17. Are you allergic to latex? No   18. Is there any chance that you may be pregnant? No       Health Care Directive  Patient has a Health Care Directive on file      Preoperative Review of             Patient Active Problem List    Diagnosis Date Noted    Double vision 10/17/2023     Priority: Medium    Hyperthyroidism 08/26/2019     Priority: Medium    Fatigue, unspecified type 04/11/2019     Priority: Medium    Low TSH level 04/04/2019     Priority: Medium    Autism spectrum disorder 01/16/2018     Priority: Medium    Chronic rhinitis 11/12/2016     Priority: Medium    VANNESSA (generalized anxiety disorder) 07/09/2016     Priority: Medium    Constipation, unspecified constipation type 12/10/2015     Priority: Medium    Gross motor delay 04/06/2015     Priority: Medium    Cognitive impairment 07/06/2012     Priority: Medium    Menorrhagia 07/06/2012     Priority: Medium      Past Medical History:   Diagnosis Date    ADHD (attention deficit hyperactivity  "disorder)     [place] diagnosis    Autism     autism spectrum disorder    Mental retardation     [place] diagnosis     No past surgical history on file.  Current Outpatient Medications   Medication Sig Dispense Refill    cholecalciferol (VITAMIN D3) 25 mcg (1000 units) capsule Take 1 capsule by mouth daily      FLUoxetine (PROZAC) 20 MG capsule TAKE 1 CAPSULE (20 MG) BY MOUTH DAILY (STOP SERTRALINE) 90 capsule 1    JULEBER 0.15-30 MG-MCG tablet TAKE ONE TABLET BY MOUTH DAILY, SKIP PLACEBO WEEK EXCEPT FOR EVERY 3RD MONTH 112 tablet 3    methimazole (TAPAZOLE) 5 MG tablet Take 0.5 tablets (2.5 mg) by mouth daily 45 tablet 3    Multiple Vitamins-Minerals (MULTIVITAL PO) Take  by mouth.      polyethylene glycol (MIRALAX) 17 GM/Dose powder Take 17 g (1 capful) by mouth as needed 850 g 3    vitamin C (ASCORBIC ACID) 500 MG tablet Take 500 mg by mouth daily         No Known Allergies     Social History     Tobacco Use    Smoking status: Never    Smokeless tobacco: Never   Substance Use Topics    Alcohol use: Never       History   Drug Use Unknown         Review of Systems    Review of Systems  Constitutional, HEENT, cardiovascular, pulmonary, gi and gu systems are negative, except as otherwise noted.  Objective    /84   Pulse 89   Temp 98  F (36.7  C) (Temporal)   Resp 18   Ht 1.651 m (5' 5\")   Wt 81.5 kg (179 lb 9.6 oz)   SpO2 97%   BMI 29.89 kg/m     Estimated body mass index is 29.89 kg/m  as calculated from the following:    Height as of this encounter: 1.651 m (5' 5\").    Weight as of this encounter: 81.5 kg (179 lb 9.6 oz).  Physical Exam  GENERAL: alert and no distress  NECK: no adenopathy, no asymmetry, masses, or scars  RESP: lungs clear to auscultation - no rales, rhonchi or wheezes  CV: regular rate and rhythm, normal S1 S2, no S3 or S4, no murmur, click or rub, no peripheral edema  ABDOMEN: soft, nontender, no hepatosplenomegaly, no masses and bowel sounds normal  MS: no gross musculoskeletal defects " "noted, no edema    No results for input(s): \"HGB\", \"PLT\", \"INR\", \"NA\", \"POTASSIUM\", \"CR\", \"A1C\" in the last 30483 hours.     Diagnostics         Revised Cardiac Risk Index (RCRI)  The patient has the following serious cardiovascular risks for perioperative complications:   - No serious cardiac risks = 0 points     RCRI Interpretation: 0 points: Class I (very low risk - 0.4% complication rate)         Signed Electronically by: Trent Coley MD  Copy of this evaluation report is provided to requesting physician.         "

## 2024-02-19 ENCOUNTER — HOSPITAL ENCOUNTER (OUTPATIENT)
Facility: AMBULATORY SURGERY CENTER | Age: 24
Discharge: HOME | End: 2024-02-19
Attending: OPHTHALMOLOGY
Payer: MEDICAID

## 2024-02-19 VITALS
HEIGHT: 68 IN | SYSTOLIC BLOOD PRESSURE: 137 MMHG | WEIGHT: 177.3 LBS | DIASTOLIC BLOOD PRESSURE: 86 MMHG | HEART RATE: 74 BPM | TEMPERATURE: 98.1 F | RESPIRATION RATE: 16 BRPM | OXYGEN SATURATION: 98 % | BODY MASS INDEX: 26.87 KG/M2

## 2024-02-19 DIAGNOSIS — Z98.890 POSTOPERATIVE EYE STATE: Primary | ICD-10-CM

## 2024-02-19 LAB
HCG UR QL: NEGATIVE
INTERNAL QC OK POCT: NORMAL
POCT KIT EXPIRATION DATE: NORMAL
POCT KIT LOT NUMBER: NORMAL

## 2024-02-19 PROCEDURE — 67312 REVISE TWO EYE MUSCLES: CPT | Performed by: ANESTHESIOLOGY

## 2024-02-19 PROCEDURE — 81025 URINE PREGNANCY TEST: CPT | Performed by: PATHOLOGY

## 2024-02-19 PROCEDURE — 67312 REVISE TWO EYE MUSCLES: CPT | Mod: RT

## 2024-02-19 PROCEDURE — 67312 REVISE TWO EYE MUSCLES: CPT | Performed by: NURSE ANESTHETIST, CERTIFIED REGISTERED

## 2024-02-19 PROCEDURE — 67312 REVISE TWO EYE MUSCLES: CPT | Mod: RT | Performed by: OPHTHALMOLOGY

## 2024-02-19 RX ORDER — PROPOFOL 10 MG/ML
INJECTION, EMULSION INTRAVENOUS PRN
Status: DISCONTINUED | OUTPATIENT
Start: 2024-02-19 | End: 2024-02-19

## 2024-02-19 RX ORDER — FENTANYL CITRATE 50 UG/ML
25 INJECTION, SOLUTION INTRAMUSCULAR; INTRAVENOUS EVERY 5 MIN PRN
Status: DISCONTINUED | OUTPATIENT
Start: 2024-02-19 | End: 2024-02-20 | Stop reason: HOSPADM

## 2024-02-19 RX ORDER — ONDANSETRON 2 MG/ML
INJECTION INTRAMUSCULAR; INTRAVENOUS PRN
Status: DISCONTINUED | OUTPATIENT
Start: 2024-02-19 | End: 2024-02-19

## 2024-02-19 RX ORDER — GABAPENTIN 300 MG/1
300 CAPSULE ORAL
Status: COMPLETED | OUTPATIENT
Start: 2024-02-19 | End: 2024-02-19

## 2024-02-19 RX ORDER — ONDANSETRON 4 MG/1
4 TABLET, ORALLY DISINTEGRATING ORAL EVERY 30 MIN PRN
Status: DISCONTINUED | OUTPATIENT
Start: 2024-02-19 | End: 2024-02-20 | Stop reason: HOSPADM

## 2024-02-19 RX ORDER — PREDNISOLONE ACETATE 10 MG/ML
SUSPENSION/ DROPS OPHTHALMIC
Qty: 10 ML | Refills: 0 | Status: SHIPPED | OUTPATIENT
Start: 2024-02-19

## 2024-02-19 RX ORDER — FENTANYL CITRATE 50 UG/ML
INJECTION, SOLUTION INTRAMUSCULAR; INTRAVENOUS PRN
Status: DISCONTINUED | OUTPATIENT
Start: 2024-02-19 | End: 2024-02-19

## 2024-02-19 RX ORDER — HYDROMORPHONE HYDROCHLORIDE 1 MG/ML
0.4 INJECTION, SOLUTION INTRAMUSCULAR; INTRAVENOUS; SUBCUTANEOUS EVERY 5 MIN PRN
Status: DISCONTINUED | OUTPATIENT
Start: 2024-02-19 | End: 2024-02-20 | Stop reason: HOSPADM

## 2024-02-19 RX ORDER — LIDOCAINE 40 MG/G
CREAM TOPICAL
Status: DISCONTINUED | OUTPATIENT
Start: 2024-02-19 | End: 2024-02-19 | Stop reason: HOSPADM

## 2024-02-19 RX ORDER — HYDROMORPHONE HYDROCHLORIDE 1 MG/ML
0.2 INJECTION, SOLUTION INTRAMUSCULAR; INTRAVENOUS; SUBCUTANEOUS EVERY 5 MIN PRN
Status: DISCONTINUED | OUTPATIENT
Start: 2024-02-19 | End: 2024-02-20 | Stop reason: HOSPADM

## 2024-02-19 RX ORDER — OXYMETAZOLINE HYDROCHLORIDE 0.05 G/100ML
SPRAY NASAL PRN
Status: DISCONTINUED | OUTPATIENT
Start: 2024-02-19 | End: 2024-02-19 | Stop reason: HOSPADM

## 2024-02-19 RX ORDER — LIDOCAINE HYDROCHLORIDE 20 MG/ML
INJECTION, SOLUTION INFILTRATION; PERINEURAL PRN
Status: DISCONTINUED | OUTPATIENT
Start: 2024-02-19 | End: 2024-02-19

## 2024-02-19 RX ORDER — ACETAMINOPHEN 325 MG/1
975 TABLET ORAL ONCE
Status: COMPLETED | OUTPATIENT
Start: 2024-02-19 | End: 2024-02-19

## 2024-02-19 RX ORDER — FENTANYL CITRATE 50 UG/ML
50 INJECTION, SOLUTION INTRAMUSCULAR; INTRAVENOUS EVERY 5 MIN PRN
Status: DISCONTINUED | OUTPATIENT
Start: 2024-02-19 | End: 2024-02-20 | Stop reason: HOSPADM

## 2024-02-19 RX ORDER — BALANCED SALT SOLUTION 6.4; .75; .48; .3; 3.9; 1.7 MG/ML; MG/ML; MG/ML; MG/ML; MG/ML; MG/ML
SOLUTION OPHTHALMIC PRN
Status: DISCONTINUED | OUTPATIENT
Start: 2024-02-19 | End: 2024-02-19 | Stop reason: HOSPADM

## 2024-02-19 RX ORDER — ONDANSETRON 2 MG/ML
4 INJECTION INTRAMUSCULAR; INTRAVENOUS EVERY 30 MIN PRN
Status: DISCONTINUED | OUTPATIENT
Start: 2024-02-19 | End: 2024-02-20 | Stop reason: HOSPADM

## 2024-02-19 RX ORDER — PROPOFOL 10 MG/ML
INJECTION, EMULSION INTRAVENOUS CONTINUOUS PRN
Status: DISCONTINUED | OUTPATIENT
Start: 2024-02-19 | End: 2024-02-19

## 2024-02-19 RX ORDER — SODIUM CHLORIDE, SODIUM LACTATE, POTASSIUM CHLORIDE, CALCIUM CHLORIDE 600; 310; 30; 20 MG/100ML; MG/100ML; MG/100ML; MG/100ML
INJECTION, SOLUTION INTRAVENOUS CONTINUOUS
Status: DISCONTINUED | OUTPATIENT
Start: 2024-02-19 | End: 2024-02-19 | Stop reason: HOSPADM

## 2024-02-19 RX ORDER — DEXAMETHASONE SODIUM PHOSPHATE 4 MG/ML
INJECTION, SOLUTION INTRA-ARTICULAR; INTRALESIONAL; INTRAMUSCULAR; INTRAVENOUS; SOFT TISSUE PRN
Status: DISCONTINUED | OUTPATIENT
Start: 2024-02-19 | End: 2024-02-19

## 2024-02-19 RX ORDER — KETOROLAC TROMETHAMINE 30 MG/ML
INJECTION, SOLUTION INTRAMUSCULAR; INTRAVENOUS PRN
Status: DISCONTINUED | OUTPATIENT
Start: 2024-02-19 | End: 2024-02-19

## 2024-02-19 RX ORDER — SODIUM CHLORIDE, SODIUM LACTATE, POTASSIUM CHLORIDE, CALCIUM CHLORIDE 600; 310; 30; 20 MG/100ML; MG/100ML; MG/100ML; MG/100ML
INJECTION, SOLUTION INTRAVENOUS CONTINUOUS
Status: DISCONTINUED | OUTPATIENT
Start: 2024-02-19 | End: 2024-02-20 | Stop reason: HOSPADM

## 2024-02-19 RX ADMIN — ACETAMINOPHEN 975 MG: 325 TABLET ORAL at 07:37

## 2024-02-19 RX ADMIN — ONDANSETRON 4 MG: 2 INJECTION INTRAMUSCULAR; INTRAVENOUS at 08:43

## 2024-02-19 RX ADMIN — KETOROLAC TROMETHAMINE 15 MG: 30 INJECTION, SOLUTION INTRAMUSCULAR; INTRAVENOUS at 09:34

## 2024-02-19 RX ADMIN — SODIUM CHLORIDE, SODIUM LACTATE, POTASSIUM CHLORIDE, CALCIUM CHLORIDE: 600; 310; 30; 20 INJECTION, SOLUTION INTRAVENOUS at 07:57

## 2024-02-19 RX ADMIN — PROPOFOL 150 MCG/KG/MIN: 10 INJECTION, EMULSION INTRAVENOUS at 08:50

## 2024-02-19 RX ADMIN — DEXAMETHASONE SODIUM PHOSPHATE 4 MG: 4 INJECTION, SOLUTION INTRA-ARTICULAR; INTRALESIONAL; INTRAMUSCULAR; INTRAVENOUS; SOFT TISSUE at 08:56

## 2024-02-19 RX ADMIN — FENTANYL CITRATE 75 MCG: 50 INJECTION, SOLUTION INTRAMUSCULAR; INTRAVENOUS at 08:55

## 2024-02-19 RX ADMIN — FENTANYL CITRATE 25 MCG: 50 INJECTION, SOLUTION INTRAMUSCULAR; INTRAVENOUS at 08:50

## 2024-02-19 RX ADMIN — LIDOCAINE HYDROCHLORIDE 80 MG: 20 INJECTION, SOLUTION INFILTRATION; PERINEURAL at 08:50

## 2024-02-19 RX ADMIN — FENTANYL CITRATE 25 MCG: 50 INJECTION, SOLUTION INTRAMUSCULAR; INTRAVENOUS at 09:58

## 2024-02-19 RX ADMIN — PROPOFOL 180 MG: 10 INJECTION, EMULSION INTRAVENOUS at 08:50

## 2024-02-19 NOTE — OP NOTE
ATTENDING SURGEON:  Geremias Borrego MD    DATE OF PROCEDURE:  February 19, 2024    FIRST SURGICAL ASSISTANT:  Sandra Zayas MD: Neuro-ophthalmology fellow     SECOND SURGICAL ASSISTANT:   Madhuri Ruiz MD (Ophthalmology Resident)     ANESTHESIA:  General anesthesia    PREOPERATIVE DIAGNOSIS (ES):  Poorly controlled intermittent exotropia   Right eye mild amblyopia    POSTOPERATIVE DIAGNOSIS (ES):  Same    NAME OF OPERATION:  Right medial rectus resection 6.0 mm  Right lateral rectus recession 8.0 mm    INDICATIONS FOR PROCEDURE:    The patient is a pleasant 23 year old with autism who has had increasing headaches and periocular discomfort in the same time frame as her control of her intermittent exotropia has worsened.  She and her Mom are eager for strabismus surgery to improve her control of her strabismus to maintain single binocular vision as well as potentially improvement in her ocular discomfort and headaches (although they were warned that it is difficult to know if this particular symptom is attributable to her decompensating strabismus).    I warned the patient about the risks, benefits, and alternatives of eye muscle surgery including a relatively small risk for severe infection, retinal detachment, and permanent vision loss of the eye.  I also discussed the possibility of postoperative double vision.  I discussed the possibility that due to postoperative double vision or a postoperative recurrent strabismus, the patient may require additional strabismus procedures in the future.  Understanding these risks, the patient decided to proceed with strabismus surgery.      DESCRIPTION OF PROCEDURE:    After the risks, benefits, and alternatives of eye muscle surgery were discussed with the patient and the patient's questions were answered both in clinic and on the day of surgery, written informed consent was obtained and witnessed in the preoperative holding area on the day of surgery.  The word  "\"yes\" was written over the right eye indicating that the patient consented to eye muscle correction in the right eye.  An intravenous line was placed and light intravenous sedation was given.  The patient was transported to the operating room where after appropriate monitors were placed, the patient underwent induction of general anesthesia without complication.      Both eyes were prepped and draped in the usual sterile fashion for ophthalmic surgery and a lid speculum was placed in the right eye.  Forced duction testing in this eye revealed no restriction.  A trapezoidal nasal conjunctival flap was created using conjunctival forceps and Miguel scissors.  This was reflected backwards to expose the right medial rectus muscle which was isolated using a Chris muscle hook.  The muscle was freed from Tenon's capsule with blunt dissection using a Miguel scissors and cotton swab.  A double armed 6-0 Vicryl suture was then woven across the width of the muscle at a point measured to be 6.0 mm posterior to the insertion and tied to the edges.  A hemostat straight clamp was then clamped perpendicular to the muscle just anterior to the suture and the distal 5.5 mm muscle segment was excised with a Miguel scissors and 0.3 forceps.  Both arms of the 6-0 Vicryl suture were then passed partial thickness through the sclera in a crossing swords technique at the physiologic insertion site.  At this point, the ends of the suture were tied together tightly advancing the muscle and completing a resection effect of 6.0 mm.  The needles were cut off and the ends were cut short.  The conjunctival flap was then re-opposed in the surgical bed and held in place using two 6-0 plain gut sutures.     A trapezoidal temporal conjunctival flap was created using conjunctival forceps and Miguel scissors.  This was reflected backwards to expose the right lateral rectus muscle which was isolated using a [place] muscle hook.  The muscle was " freed from Tenon's capsule with blunt dissection using a Miguel scissors and cotton swab.  A double armed 6-0 Vicryl suture was then woven across the width of the muscle near it's insertion of the globe and tied to the edges.  The muscle was disinserted from the globe using Miguel scissors.  Both arms of the 6-0 Vicryl suture were then passed partial thickness through the sclera at a point measured to be 8.0 mm posterior to the physiologic muscle insertion using a caliper.  At this point, the ends of the suture were tied together.  The needles were cut off and the ends were cut short.  The conjunctival flap was then re-opposed in the surgical bed and held in place using two 6-0 plain gut sutures.  The lid speculum was removed from the operative eye.     Maxitrol ointment was placed in the right eye.  The patient was awakened from general anesthesia without complication and discharged to the recovery room in stable condition.       SPECIMENS REMOVED:  None.      ESTIMATED BLOOD LOSS:  1 mL or less.    INTRAOPERATIVE FLUIDS:  As per anesthesia records.      SPONGE/INSTRUMENT/NEEDLE COUNTS:  All sponge, instrument, and needle counts were correct times two.    CONDITION ON DISCHARGE FROM OPERATING ROOM:  Stable.      COMPLICATIONS:  None.     I was present for the entire procedure

## 2024-02-19 NOTE — ANESTHESIA CARE TRANSFER NOTE
Patient: Maren Winchester    Procedure: Procedure(s):  Eye muscle correction, right eye.       Diagnosis: Double vision [H53.2]  Diagnosis Additional Information: No value filed.    Anesthesia Type:   General     Note:    Oropharynx: oropharynx clear of all foreign objects and spontaneously breathing  Level of Consciousness: drowsy  Oxygen Supplementation: face mask  Level of Supplemental Oxygen (L/min / FiO2): 6  Independent Airway: airway patency satisfactory and stable    Vital Signs Stable: post-procedure vital signs reviewed and stable  Report to RN Given: handoff report given  Patient transferred to: PACU  Comments: Uneventful transport   Report to RN  Exchanging well; color natl  Pt sleeping  IV patent  Lips/teeth/dentition as preop status  Questions answered      Handoff Report: Identifed the Patient, Identified the Reponsible Provider, Reviewed the pertinent medical history, Discussed the surgical course, Reviewed Intra-OP anesthesia mangement and issues during anesthesia, Set expectations for post-procedure period and Allowed opportunity for questions and acknowledgement of understanding    Vitals:  Vitals Value Taken Time   /94 02/19/24 0945   Temp 97.2    Pulse 75 02/19/24 0946   Resp 13 02/19/24 0946   SpO2 99 % 02/19/24 0946   Vitals shown include unfiled device data.    Electronically Signed By: JOSE OLIVERA CRNA  February 19, 2024  9:48 AM

## 2024-02-19 NOTE — ANESTHESIA PROCEDURE NOTES
Airway       Patient location during procedure: OR       Procedure Start/Stop Times: 2/19/2024 8:52 AM  Staff -        CRNA: Rut Burroughs APRN CRNA       Performed By: CRNAIndications and Patient Condition       Indications for airway management: derick-procedural       Induction type:intravenous       Mask difficulty assessment: 0 - not attempted    Final Airway Details       Final airway type: supraglottic airway    Supraglottic Airway Details        Type: LMA       Brand: LMA Unique       LMA size: 4    Post intubation assessment        Placement verified by: capnometry, equal breath sounds and chest rise        Number of attempts at approach: 1       Number of other approaches attempted: 0       Secured with: tape       Ease of procedure: easy       Dentition: Intact and Unchanged    Medication(s) Administered   Medication Administration Time: 2/19/2024 8:52 AM

## 2024-02-19 NOTE — DISCHARGE INSTRUCTIONS
Routine, Instructions for after your eye muscle surgery: Instill 1 cm of Maxitrol ophthalmic ointment in the operative eye(s) in 3 times per day until follow-up with Dr. Borrego in about 1-2 weeks. The ointment is expected to blur vision but is necessary. You will also go home with a prescription for a steroid eye drop. Do NOT start this eye drop yet. When you see Dr. Borrego at your post-operative visit he will tell you if and when to start the drops. Avoid all eye pressure or trauma for 7 days. No eye rubbing, straining, swimming, athletics, or outdoor activities in which dirt or foreign objects could enter the eye. ok to take a bath and shower immediately but don't run shower water on face. Tylenol or ibuprofen over the counter may be used for pain. Pain can occasionally be moderate for 1 or 2 days after surgery. If pain is severe or does not improve greatly with over the counter medications call our office. Return for follow-up with Dr. Borrego as already scheduled (generally about 1-2 weeks after surgery). If you do not have an appointment already or you need to change your 1-2 week appointment, please call Luis Moreno at [phone] or our  at [phone] If you are concerned about a healing problem after surgery, contact my assistant Rei Vasquez at [phone] or our triage nurse at [phone] during standard business hours. After hours for emergent concerns you may call the [facility] at [phone] and ask to speak with the ophthalmology resident on call.           [facility]  Home Care Following Anesthesia  For 24 hours after surgery:  Get plenty of rest.  A responsible adult must stay with you for at least 24 hours after you leave the surgery center.  Do not drive or use heavy equipment.  If you have weakness or tingling, don't drive or use heavy equipment until this feeling goes away.   Do not drink alcohol.   Avoid  strenuous or risky activities.  Ask for help when climbing stairs.  You may feel lightheaded.  IF so, sit for a few minutes before standing.  Have someone help you get up.   If you have nausea (feel sick to your stomach): Drink only clear liquids such as apple juice, ginger ale, broth or 7-Up.  Rest may also help.  Be sure to drink enough fluids.  Move to a regular diet as you feel able.   You may have a slight fever.  Call the doctor if your fever is over 100 F (37.7 C) (taken under the tongue) or lasts longer than 24 hours.  You may have a dry mouth, a sore throat, muscle aches or trouble sleeping. These should go away after 24 hours.  Do not make important or legal decisions.   It is recommended to avoid smoking.          Tips for taking pain medications  To get the best pain relief possible, remember these points:  Take pain medications as directed, before pain becomes severe.  Pain medication can upset your stomach: taking it with food may help.  Constipation is a common side effect of pain medication. Drink plenty of  fluids.  Eat foods high in fiber. Take a stool softener if recommended by your doctor or pharmacist.  Do not drink alcohol, drive or operate machinery while taking pain medications.  Ask about other ways to control pain, such as with heat, ice or relaxation.    Tylenol/Acetaminophen Consumption    If you feel your pain relief is insufficient, you may take Tylenol/Acetaminophen in addition to your narcotic pain medication.   Be careful not to exceed 4,000 mg of Tylenol/Acetaminophen in a 24 hour period from all sources.  If you are taking extra strength Tylenol/acetaminophen (500 mg), the maximum dose is 8 tablets in 24 hours.  If you are taking regular strength acetaminophen (325 mg), the maximum dose is 12 tablets in 24 hours.  You were given 975mg of Tylenol at 0740, you may take Tylenol again at 1:40 pm.   You were given a medication like ibuprofen at 9:30, you may take ibuprofen again at  3:30pm.    Call a doctor for any of the following:  Signs of infection (fever, growing tenderness at the surgery site, a large amount of drainage or bleeding, severe pain, foul-smelling drainage, redness, swelling).  It has been over 8 to 10 hours since surgery and you are still not able to urinate (pass water).  Headache for over 24 hours.  Numbness, tingling or weakness the day after surgery (if you had spinal anesthesia).  Signs of Covid-19 infection (temperature over 100 degrees, shortness of breath, cough, loss of taste/smell, generalized body aches, persistent headache, chills, sore throat, nausea/vomiting/diarrhea)  Your doctor is:       Dr. Geremias Borrego, Ophthalmology: [phone]               Or dial [phone] and ask for the resident on call for:  Ophthalmology  For emergency care, call the:  [facility]:  [phone] (TTY for hearing impaired: [phone])

## 2024-02-19 NOTE — BRIEF OP NOTE
[facility] Brief Operative Note    Pre-operative diagnosis: Double vision [H53.2]   Post-operative diagnosis Same   Procedure: Procedure(s):  Eye muscle correction, right eye.   Surgeon: Robbi Borrego MD    Assistants(s): MD Madhuri Lewis MD   Estimated blood loss: Less than 1 cc    Specimens: None   Findings: See full operative report

## 2024-02-19 NOTE — ANESTHESIA POSTPROCEDURE EVALUATION
Patient: Maren Winchester    Procedure: Procedure(s):  Eye muscle correction, right eye.       Anesthesia Type:  General    Note:  Disposition: Outpatient   Postop Pain Control: Uneventful            Sign Out: Well controlled pain   PONV: No   Neuro/Psych: Uneventful            Sign Out: Acceptable/Baseline neuro status   Airway/Respiratory: Uneventful            Sign Out: Acceptable/Baseline resp. status   CV/Hemodynamics: Uneventful            Sign Out: Acceptable CV status; No obvious hypovolemia; No obvious fluid overload   Other NRE: NONE   DID A NON-ROUTINE EVENT OCCUR? No           Last vitals:  Vitals Value Taken Time   /68 02/19/24 1000   Temp 36.8  C (98.3  F) 02/19/24 1000   Pulse 78 02/19/24 1002   Resp 13 02/19/24 1002   SpO2 93 % 02/19/24 1008   Vitals shown include unfiled device data.    Electronically Signed By: Darryl Ellis DO  February 19, 2024  12:29 PM

## 2024-02-27 NOTE — PROGRESS NOTES
1. 1 week post-op status post strabismus surgery:     -Surgery: 2/19/24    PREOPERATIVE DIAGNOSIS (ES):  Poorly controlled intermittent exotropia   Right eye mild amblyopia     POSTOPERATIVE DIAGNOSIS (ES):  Same     NAME OF OPERATION:  Right medial rectus resection 6.0 mm  Right lateral rectus recession 8.0 mm    - Alignment: Consecutive right esotropia 12 PD but patient fusing most of the time in clinic  Expect she will continue to drift out.  - Diplopia: ?Yes. Mother notes improvement in closing one eyes (less frequent)  - Healing up appropriately  - Maxitrol ophthalmic ointment: stop  - Start Predforte 1% four times a day in the operative eye(s) and decrease by one drop daily every week.  Course will complete in 1 month.    Return to clinic in 3 months or sooner as needed.       Complete documentation of historical and exam elements from today's encounter can be found in the full encounter summary report (not reduplicated in this progress note).  I personally obtained the chief complaint(s) and history of present illness.  I confirmed and edited as necessary the review of systems, past medical/surgical history, family history, social history, and examination findings as documented by others; and I examined the patient myself.  I personally reviewed the relevant tests, images, and reports as documented above.  I formulated and edited as necessary the assessment and plan and discussed the findings and management plan with the patient and family   MD Madhuri Philippe MD  Resident Physician, PGY-3  Department of Ophthalmology

## 2024-02-28 ENCOUNTER — OFFICE VISIT (OUTPATIENT)
Dept: OPHTHALMOLOGY | Facility: CLINIC | Age: 24
End: 2024-02-28
Attending: OPHTHALMOLOGY
Payer: MEDICAID

## 2024-02-28 DIAGNOSIS — H53.2 DOUBLE VISION: Primary | ICD-10-CM

## 2024-02-28 PROCEDURE — 99024 POSTOP FOLLOW-UP VISIT: CPT | Mod: GC | Performed by: OPHTHALMOLOGY

## 2024-02-28 PROCEDURE — G0463 HOSPITAL OUTPT CLINIC VISIT: HCPCS | Performed by: OPHTHALMOLOGY

## 2024-02-28 ASSESSMENT — SLIT LAMP EXAM - LIDS
COMMENTS: NORMAL
COMMENTS: NORMAL

## 2024-02-28 ASSESSMENT — VISUAL ACUITY
OD_SC: 20/25
METHOD: SNELLEN - LINEAR
OS_SC: 20/20

## 2024-02-28 ASSESSMENT — EXTERNAL EXAM - LEFT EYE: OS_EXAM: NORMAL

## 2024-02-28 ASSESSMENT — EXTERNAL EXAM - RIGHT EYE: OD_EXAM: NORMAL

## 2024-02-28 ASSESSMENT — TONOMETRY
OS_IOP_MMHG: 12
OD_IOP_MMHG: 14
IOP_METHOD: ICARE

## 2024-03-11 ENCOUNTER — TELEPHONE (OUTPATIENT)
Dept: ENDOCRINOLOGY | Facility: CLINIC | Age: 24
End: 2024-03-11

## 2024-03-11 ENCOUNTER — LAB (OUTPATIENT)
Dept: LAB | Facility: CLINIC | Age: 24
End: 2024-03-11
Payer: MEDICAID

## 2024-03-11 DIAGNOSIS — E05.00 GRAVES' DISEASE: ICD-10-CM

## 2024-03-11 LAB
T3 SERPL-MCNC: 174 NG/DL (ref 85–202)
T4 FREE SERPL-MCNC: 1.18 NG/DL (ref 0.9–1.7)
TSH SERPL DL<=0.005 MIU/L-ACNC: 1.37 UIU/ML (ref 0.3–4.2)

## 2024-03-11 PROCEDURE — 84480 ASSAY TRIIODOTHYRONINE (T3): CPT

## 2024-03-11 PROCEDURE — 36415 COLL VENOUS BLD VENIPUNCTURE: CPT

## 2024-03-11 PROCEDURE — 84439 ASSAY OF FREE THYROXINE: CPT

## 2024-03-11 PROCEDURE — 84443 ASSAY THYROID STIM HORMONE: CPT

## 2024-03-11 NOTE — TELEPHONE ENCOUNTER
[facility] Call Center    Phone Message    May a detailed message be left on voicemail: yes     Reason for Call: Order(s): Other:   Reason for requested:Thyroid  Lab orders   Date needed: Today 3/11/24  Provider name: Dr. Vallecillo     Action Taken: Other: ENDO    Travel Screening: Not Applicable

## 2024-03-11 NOTE — TELEPHONE ENCOUNTER
Per Dr. Vallecillo, please extend current lab order.     Lab orders extended and [place] lab informed.    Taylor Wall, [facility]  Adult Endocrinology  [facility], [place]

## 2024-03-12 DIAGNOSIS — N92.0 MENORRHAGIA WITH REGULAR CYCLE: ICD-10-CM

## 2024-03-12 RX ORDER — DESOGESTREL AND ETHINYL ESTRADIOL 0.15-0.03
KIT ORAL
Qty: 112 TABLET | Refills: 1 | Status: SHIPPED | OUTPATIENT
Start: 2024-03-12 | End: 2024-09-04

## 2024-03-15 ENCOUNTER — VIRTUAL VISIT (OUTPATIENT)
Dept: ENDOCRINOLOGY | Facility: CLINIC | Age: 24
End: 2024-03-15
Payer: MEDICAID

## 2024-03-15 VITALS — BODY MASS INDEX: 25.85 KG/M2 | WEIGHT: 170 LBS

## 2024-03-15 DIAGNOSIS — E05.00 GRAVES' DISEASE: Primary | ICD-10-CM

## 2024-03-15 DIAGNOSIS — E05.90 HYPERTHYROIDISM: ICD-10-CM

## 2024-03-15 PROCEDURE — G2211 COMPLEX E/M VISIT ADD ON: HCPCS | Mod: 95 | Performed by: INTERNAL MEDICINE

## 2024-03-15 PROCEDURE — 99214 OFFICE O/P EST MOD 30 MIN: CPT | Mod: 24 | Performed by: INTERNAL MEDICINE

## 2024-03-15 RX ORDER — METHIMAZOLE 5 MG/1
2.5 TABLET ORAL DAILY
Qty: 45 TABLET | Refills: 3 | Status: SHIPPED | OUTPATIENT
Start: 2024-03-15

## 2024-03-15 NOTE — LETTER
3/15/2024         RE: Maren Winchester  [address] [place] [address]        Dear Colleague,    Thank you for referring your patient, Maren Winchester, to the [facility]. Please see a copy of my visit note below.    Virtual Visit Details  Start 9:40 am  End 10:00 am  [place]                    ----   Endocrinology follow-up note   ---       Reason for visit/consult: Hyperthyroidism, Graves disease     Assessment and Plan  A 22 yo female with Graves' disease and Grave's ophthalmopathy     # Grave' disease - TSI, TSH receptor Ab positive. Diagnosed Graves disease in August 2019.   Currently undercontrolled. Still has goiter, significantly high antibody titier (TSH receptor antibody 101 initial), and she is young and otherwise healthy, I think to meet neck surgeon and open a discussion would be reasonable    - continue methimazole 25 mg daily     - TFT every 6 month lab check    - Referral to Dr. Menjivar      # Grave's ophthalmopathy evaluation  She is seen by Dr. Borrego. Right eye surgery was done in 2/2024 and currently on healing process.        # menstrual cycle  On BCP every 3 month        RTC with me annually      The longitudinal plan of care for the diagnosis(es)/condition(s) as documented were addressed during this visit. Due to the added complexity in care, I will continue to support Maren in the subsequent management and with ongoing continuity of care.     30 minutes spent on the date of the encounter doing chart review, history and exam, documentation and further activities as noted above.    Lashonda Vallecillo MD  Staff Physician  Endocrinology and Metabolism  [facility]  License: [id number]  Pager: [phone]     Interval History as of 3/15/2024 : Patient has been doing well and feeling well. Medication compliance is excellent. She underwent right eye surgery in 2/2024.  Interval History as of 10/13/2023: Patient reports overall doing ok on  her current dose of methimazole for symptom management, still has some symptoms today of dizziness and headache. PCP had concerns for eye problems and was referred to the [facility] opthalmology for follow-up and appointment is next week. No rashes of fevers.   Interval History as of 12/9/2022 : Patient has been doing ok, compliant to methimazole, feeling dizzy episodes recently, BP was ok 120s.  .  Interval History as of 6/17/2022 : Patient has been doing well. Medication compliance: excellent   . New event includes: no pertinent medical event noted  .  Interval History as of 7/16/2021 : Patient has been doing well, but slight puffy eye. Occasional palpitation? Medication compliance excellent  On methimazole 2.5 mg Mon/Wed/Fri for 1 year.  Interval History as of 1/15/2021 : Patient has been doing well. Last seen 6 month ago . Medication compliance excellent  . 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  Interval History as of 7/24/2020 : Patient has been doing well. Last seen 7 month ago . Medication compliance excellent. Taking methimazole 1 year and now (current dose methimazole 5 mg)  euthryoid. Rather some symptoms of hypothyroidism (constipation, weight gain, fatigue), we will reduce the dose.  Interval History as of 1/24/2020 : Patient has been doing well.  Medication compliance excellent  . New event includes : occasional HA, no N/V. No palpitation. eue symptoms getting better. 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   Interval History as of 10/18/2019 Diagnosed in August 2019 currently she is on methimazole 5 mg and propranolol 2 0 mg twice daily.  Her parents are taking care of her medication and that she is compliant.  She still mentioned fatigue, insomnia, however by examining her today her tachycardia resolved and she had a slight swelling to the size of goiter.  TSH is still suppressed however free T4 became normalized.   HPI: A 17 yo female here for the consultation of her  hyperthyroidism.  Patient came with her parents today.      Patient mentioned for the past 6 months she started to have mild dysphagia when she eats breakfast and some sore throat.  Also excessive fatigue, insomnia.  She also started to have eye itchiness past several months, she went to ophthalmologist her vision was okay.   She gained 5 pounds since April 2019 she is hungry was that time.   She usually has constipation using the laxative however she started to have some diarrhea.   Her mood tends to be more depressed recently and also increase anxiety level according to the parents.   Patient also has family history of thyroid condition in her maternal side.  Mother : hemithyroidectomy. Maternal aunt: goiter  maternal grandmother: DM        Medications:  Current Outpatient Medications   Medication     cholecalciferol (VITAMIN D3) 25 mcg (1000 units) capsule     desogestrel-ethinyl estradiol (JULEBER) 0.15-30 MG-MCG tablet     FLUoxetine (PROZAC) 20 MG capsule     methimazole (TAPAZOLE) 5 MG tablet     Multiple Vitamins-Minerals (MULTIVITAL PO)     polyethylene glycol (MIRALAX) 17 GM/Dose powder     prednisoLONE acetate (PRED FORTE) 1 % ophthalmic suspension     prednisoLONE acetate (PRED FORTE) 1 % ophthalmic suspension     vitamin C (ASCORBIC ACID) 500 MG tablet     No current facility-administered medications for this visit.        Allergies:   No Known Allergies       PAST MEDICAL HISTORY:   Past Medical History:   Diagnosis Date     ADHD (attention deficit hyperactivity disorder)     [place] diagnosis     Autism     autism spectrum disorder     Hyperthyroidism 08/26/2019     Mental retardation     [place] diagnosis       PAST SURGICAL HISTORY:   Past Surgical History:   Procedure Laterality Date     RECESSION RESECTION (REPAIR STRABISMUS) Right 2/19/2024    Procedure: Eye muscle correction, right eye.;  Surgeon: Geremias Borrego MD;  [place] [facility] [place]       FAMILY HISTORY:   Family History   Problem  Relation Age of Onset     Depression Mother      Anxiety Disorder Mother      Thyroid Disease Mother      Diabetes Maternal Grandmother      Obesity Maternal Grandmother      Depression Sister      Asthma No family hx of      Cerebrovascular Disease No family hx of    Mother : hemithyroidectomy. Maternal aunt: goiter  maternal grandmother: DM    SOCIAL HISTORY:   Social History     Tobacco Use     Smoking status: Never     Smokeless tobacco: Never   Substance Use Topics     Alcohol use: Never      Works at [employer] 2-3 days a week and enjoys her work     Labs:  TSH   Date Value Ref Range Status   03/11/2024 1.37 0.30 - 4.20 uIU/mL Final   12/11/2022 1.42 0.40 - 4.00 mU/L Final   07/10/2021 1.62 0.40 - 4.00 mU/L Final     T4 Free   Date Value Ref Range Status   07/10/2021 0.99 0.76 - 1.46 ng/dL Final     Free T4   Date Value Ref Range Status   03/11/2024 1.18 0.90 - 1.70 ng/dL Final      Imaging:  None reviewed    Physical Exam:    Wt 77.1 kg (170 lb)   BMI 25.85 kg/m       Physical Exam  Constitutional:       Appearance: Normal appearance.   HENT:      Head: Normocephalic and atraumatic.   Eyes:      Comments: Subtle eye lid swelling and some uncoordinated eye movement   Musculoskeletal:      Cervical back: Normal range of motion.   Neurological:      General: No focal deficit present.      Mental Status: She is alert.   Psychiatric:         Mood and Affect: Mood normal.         Behavior: Behavior normal.        ROS: Negative unless noted in HPI      Again, thank you for allowing me to participate in the care of your patient.        Sincerely,        Lashonda Vallecillo MD

## 2024-03-15 NOTE — PROGRESS NOTES
Virtual Visit Details  Start 9:40 am  End 10:00 am  [place]                    ----   Endocrinology follow-up note   ---       Reason for visit/consult: Hyperthyroidism, Graves disease     Assessment and Plan  A 22 yo female with Graves' disease and Grave's ophthalmopathy     # Grave' disease - TSI, TSH receptor Ab positive. Diagnosed Graves disease in August 2019.   Currently undercontrolled. Still has goiter, significantly high antibody titier (TSH receptor antibody 101 initial), and she is young and otherwise healthy, I think to meet neck surgeon and open a discussion would be reasonable    - continue methimazole 25 mg daily     - TFT every 6 month lab check    - Referral to Dr. Menjivar      # Grave's ophthalmopathy evaluation  She is seen by Dr. Borrego. Right eye surgery was done in 2/2024 and currently on healing process.        # menstrual cycle  On BCP every 3 month        RTC with me annually      The longitudinal plan of care for the diagnosis(es)/condition(s) as documented were addressed during this visit. Due to the added complexity in care, I will continue to support Maren in the subsequent management and with ongoing continuity of care.     30 minutes spent on the date of the encounter doing chart review, history and exam, documentation and further activities as noted above.    Lashonda Vallecillo MD  Staff Physician  Endocrinology and Metabolism  [facility]  License: [id number]  Pager: [phone]     Interval History as of 3/15/2024 : Patient has been doing well and feeling well. Medication compliance is excellent. She underwent right eye surgery in 2/2024.  Interval History as of 10/13/2023: Patient reports overall doing ok on her current dose of methimazole for symptom management, still has some symptoms today of dizziness and headache. PCP had concerns for eye problems and was referred to the [facility] opthalmology for follow-up and appointment is next week. No  rashes of fevers.   Interval History as of 12/9/2022 : Patient has been doing ok, compliant to methimazole, feeling dizzy episodes recently, BP was ok 120s.  .  Interval History as of 6/17/2022 : Patient has been doing well. Medication compliance: excellent   . New event includes: no pertinent medical event noted  .  Interval History as of 7/16/2021 : Patient has been doing well, but slight puffy eye. Occasional palpitation? Medication compliance excellent  On methimazole 2.5 mg Mon/Wed/Fri for 1 year.  Interval History as of 1/15/2021 : Patient has been doing well. Last seen 6 month ago . Medication compliance excellent  . 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  Interval History as of 7/24/2020 : Patient has been doing well. Last seen 7 month ago . Medication compliance excellent. Taking methimazole 1 year and now (current dose methimazole 5 mg)  euthryoid. Rather some symptoms of hypothyroidism (constipation, weight gain, fatigue), we will reduce the dose.  Interval History as of 1/24/2020 : Patient has been doing well.  Medication compliance excellent  . New event includes : occasional HA, no N/V. No palpitation. eue symptoms getting better. 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   Interval History as of 10/18/2019 Diagnosed in August 2019 currently she is on methimazole 5 mg and propranolol 2 0 mg twice daily.  Her parents are taking care of her medication and that she is compliant.  She still mentioned fatigue, insomnia, however by examining her today her tachycardia resolved and she had a slight swelling to the size of goiter.  TSH is still suppressed however free T4 became normalized.   HPI: A 19 yo female here for the consultation of her hyperthyroidism.  Patient came with her parents today.      Patient mentioned for the past 6 months she started to have mild dysphagia when she eats breakfast and some sore throat.  Also excessive fatigue, insomnia.  She also started to have eye itchiness  past several months, she went to ophthalmologist her vision was okay.   She gained 5 pounds since April 2019 she is hungry was that time.   She usually has constipation using the laxative however she started to have some diarrhea.   Her mood tends to be more depressed recently and also increase anxiety level according to the parents.   Patient also has family history of thyroid condition in her maternal side.  Mother : hemithyroidectomy. Maternal aunt: goiter  maternal grandmother: DM        Medications:  Current Outpatient Medications   Medication    cholecalciferol (VITAMIN D3) 25 mcg (1000 units) capsule    desogestrel-ethinyl estradiol (JULEBER) 0.15-30 MG-MCG tablet    FLUoxetine (PROZAC) 20 MG capsule    methimazole (TAPAZOLE) 5 MG tablet    Multiple Vitamins-Minerals (MULTIVITAL PO)    polyethylene glycol (MIRALAX) 17 GM/Dose powder    prednisoLONE acetate (PRED FORTE) 1 % ophthalmic suspension    prednisoLONE acetate (PRED FORTE) 1 % ophthalmic suspension    vitamin C (ASCORBIC ACID) 500 MG tablet     No current facility-administered medications for this visit.        Allergies:   No Known Allergies       PAST MEDICAL HISTORY:   Past Medical History:   Diagnosis Date    ADHD (attention deficit hyperactivity disorder)     [place] diagnosis    Autism     autism spectrum disorder    Hyperthyroidism 08/26/2019    Mental retardation     [place] diagnosis       PAST SURGICAL HISTORY:   Past Surgical History:   Procedure Laterality Date    RECESSION RESECTION (REPAIR STRABISMUS) Right 2/19/2024    Procedure: Eye muscle correction, right eye.;  Surgeon: Geremias Borrego MD;  [place] [facility] [place]       FAMILY HISTORY:   Family History   Problem Relation Age of Onset    Depression Mother     Anxiety Disorder Mother     Thyroid Disease Mother     Diabetes Maternal Grandmother     Obesity Maternal Grandmother     Depression Sister     Asthma No family hx of     Cerebrovascular Disease No family hx of    Mother :  hemithyroidectomy. Maternal aunt: goiter  maternal grandmother: DM    SOCIAL HISTORY:   Social History     Tobacco Use    Smoking status: Never    Smokeless tobacco: Never   Substance Use Topics    Alcohol use: Never      Works at [employer] 2-3 days a week and enjoys her work     Labs:  TSH   Date Value Ref Range Status   03/11/2024 1.37 0.30 - 4.20 uIU/mL Final   12/11/2022 1.42 0.40 - 4.00 mU/L Final   07/10/2021 1.62 0.40 - 4.00 mU/L Final     T4 Free   Date Value Ref Range Status   07/10/2021 0.99 0.76 - 1.46 ng/dL Final     Free T4   Date Value Ref Range Status   03/11/2024 1.18 0.90 - 1.70 ng/dL Final      Imaging:  None reviewed    Physical Exam:    Wt 77.1 kg (170 lb)   BMI 25.85 kg/m       Physical Exam  Constitutional:       Appearance: Normal appearance.   HENT:      Head: Normocephalic and atraumatic.   Eyes:      Comments: Subtle eye lid swelling and some uncoordinated eye movement   Musculoskeletal:      Cervical back: Normal range of motion.   Neurological:      General: No focal deficit present.      Mental Status: She is alert.   Psychiatric:         Mood and Affect: Mood normal.         Behavior: Behavior normal.        ROS: Negative unless noted in HPI

## 2024-03-20 ENCOUNTER — MYC MEDICAL ADVICE (OUTPATIENT)
Dept: FAMILY MEDICINE | Facility: CLINIC | Age: 24
End: 2024-03-20
Payer: MEDICAID

## 2024-03-20 DIAGNOSIS — R41.89 COGNITIVE IMPAIRMENT: Primary | ICD-10-CM

## 2024-03-20 DIAGNOSIS — Z12.4 CERVICAL CANCER SCREENING: ICD-10-CM

## 2024-03-20 DIAGNOSIS — F84.0 AUTISM SPECTRUM DISORDER: ICD-10-CM

## 2024-04-19 ENCOUNTER — OFFICE VISIT (OUTPATIENT)
Dept: FAMILY MEDICINE | Facility: CLINIC | Age: 24
End: 2024-04-19
Payer: MEDICAID

## 2024-04-19 VITALS
TEMPERATURE: 97.5 F | RESPIRATION RATE: 20 BRPM | WEIGHT: 180 LBS | OXYGEN SATURATION: 98 % | BODY MASS INDEX: 29.99 KG/M2 | SYSTOLIC BLOOD PRESSURE: 114 MMHG | HEIGHT: 65 IN | HEART RATE: 84 BPM | DIASTOLIC BLOOD PRESSURE: 82 MMHG

## 2024-04-19 DIAGNOSIS — Z11.59 NEED FOR HEPATITIS C SCREENING TEST: ICD-10-CM

## 2024-04-19 DIAGNOSIS — Z12.4 CERVICAL CANCER SCREENING: ICD-10-CM

## 2024-04-19 DIAGNOSIS — Z11.4 SCREENING FOR HIV (HUMAN IMMUNODEFICIENCY VIRUS): Primary | ICD-10-CM

## 2024-04-19 PROCEDURE — 99213 OFFICE O/P EST LOW 20 MIN: CPT | Mod: 24 | Performed by: FAMILY MEDICINE

## 2024-04-19 ASSESSMENT — PAIN SCALES - GENERAL: PAINLEVEL: NO PAIN (0)

## 2024-04-19 NOTE — PROGRESS NOTES
Preoperative Evaluation  [address] [address]  Phone: [phone]  Fax: [phone]  Primary Provider: Antoinette Coley  Pre-op Performing Provider: ANTOINETTE COLEY  Apr 19, 2024       Maren is a 23 year old, presenting for the following:  Pre-Op Exam        4/19/2024     1:10 PM   Additional Questions   Roomed by Maggie   Accompanied by step dad     Surgical Information  Surgery/Procedure:   Exam under anesthesia pelvic N/A MAC   PAPANICOLAOU SMEAR       Surgery Location: [facility]  Surgeon:   Antoinette Coley MD   Surgery Date: 4/26/24  Time of Surgery: 0930  Where patient plans to recover: At home with family  Fax number for surgical facility: Note does not need to be faxed, will be available electronically in Epic.    Assessment & Plan     The proposed surgical procedure is considered LOW risk.      ICD-10-CM    1. Screening for HIV (human immunodeficiency virus)  Z11.4       2. Need for hepatitis C screening test  Z11.59       3. Cervical cancer screening  Z12.4                Possible Sleep Apnea:            - No identified additional risk factors other than previously addressed        Recommendation  APPROVAL GIVEN to proceed with proposed procedure, without further diagnostic evaluation.          Subjective       HPI related to upcoming procedure:     Having pap under anesthesia  Feels well        4/19/2024     1:05 PM   Preop Questions   1. Have you ever had a heart attack or stroke? No   2. Have you ever had surgery on your heart or blood vessels, such as a stent placement, a coronary artery bypass, or surgery on an artery in your head, neck, heart, or legs? No   3. Do you have chest pain with activity? No   4. Do you have a history of  heart failure? No   5. Do you currently have a cold, bronchitis or symptoms of other infection? No   6. Do you have a cough, shortness of breath, or  wheezing? No   7. Do you or anyone in your family have previous history of blood clots? No   8. Do you or does anyone in your family have a serious bleeding problem such as prolonged bleeding following surgeries or cuts? No   9. Have you ever had problems with anemia or been told to take iron pills? No   10. Have you had any abnormal blood loss such as black, tarry or bloody stools, or abnormal vaginal bleeding? No   11. Have you ever had a blood transfusion? No   12. Are you willing to have a blood transfusion if it is medically needed before, during, or after your surgery? Yes   13. Have you or any of your relatives ever had problems with anesthesia? No   14. Do you have sleep apnea, excessive snoring or daytime drowsiness? YES -    14a. Do you have a CPAP machine? No   15. Do you have any artifical heart valves or other implanted medical devices like a pacemaker, defibrillator, or continuous glucose monitor? No   16. Do you have artificial joints? No   17. Are you allergic to latex? No   18. Is there any chance that you may be pregnant? No       Health Care Directive  Patient has a Health Care Directive on file      Preoperative Review of             Patient Active Problem List    Diagnosis Date Noted    Double vision 10/17/2023     Priority: Medium    Hyperthyroidism 08/26/2019     Priority: Medium    Fatigue, unspecified type 04/11/2019     Priority: Medium    Low TSH level 04/04/2019     Priority: Medium    Autism spectrum disorder 01/16/2018     Priority: Medium    Chronic rhinitis 11/12/2016     Priority: Medium    VANNESSA (generalized anxiety disorder) 07/09/2016     Priority: Medium    Constipation, unspecified constipation type 12/10/2015     Priority: Medium    Gross motor delay 04/06/2015     Priority: Medium    Cognitive impairment 07/06/2012     Priority: Medium    Menorrhagia 07/06/2012     Priority: Medium      Past Medical History:   Diagnosis Date    ADHD (attention deficit hyperactivity disorder)   "   [place] diagnosis    Autism     autism spectrum disorder    Hyperthyroidism 08/26/2019    Mental retardation     [place] diagnosis     Past Surgical History:   Procedure Laterality Date    RECESSION RESECTION (REPAIR STRABISMUS) Right 2/19/2024    Procedure: Eye muscle correction, right eye.;  Surgeon: Geremias Borrego MD;  Location: [facility] OR     Current Outpatient Medications   Medication Sig Dispense Refill    cholecalciferol (VITAMIN D3) 25 mcg (1000 units) capsule Take 1 capsule by mouth daily      desogestrel-ethinyl estradiol (JULEBER) 0.15-30 MG-MCG tablet TAKE ONE TABLET BY MOUTH DAILY, SKIP PLACEBO WEEK EXCEPT FOR EVERY 3RD MONTH 112 tablet 1    FLUoxetine (PROZAC) 20 MG capsule TAKE 1 CAPSULE (20 MG) BY MOUTH DAILY (STOP SERTRALINE) 90 capsule 1    methimazole (TAPAZOLE) 5 MG tablet Take 0.5 tablets (2.5 mg) by mouth daily 45 tablet 3    Multiple Vitamins-Minerals (MULTIVITAL PO) Take  by mouth.      polyethylene glycol (MIRALAX) 17 GM/Dose powder Take 17 g (1 capful) by mouth as needed 850 g 3    vitamin C (ASCORBIC ACID) 500 MG tablet Take 500 mg by mouth daily      prednisoLONE acetate (PRED FORTE) 1 % ophthalmic suspension Instill 1 drop into operative eye(s) four times a day.  Do NOT start drops until instructed by Surgeon. 10 mL 0    prednisoLONE acetate (PRED FORTE) 1 % ophthalmic suspension Instill 1 drop into operative eye(s) four times a day.  Do NOT start drops until instructed by Surgeon. 10 mL 0       No Known Allergies     Social History     Tobacco Use    Smoking status: Never    Smokeless tobacco: Never   Substance Use Topics    Alcohol use: Never       History   Drug Use Unknown         Review of Systems    Review of Systems  Constitutional, HEENT, cardiovascular, pulmonary, gi and gu systems are negative, except as otherwise noted.    Objective    /82   Pulse 84   Temp 97.5  F (36.4  C) (Temporal)   Resp 20   Ht 1.645 m (5' 4.76\")   Wt 81.6 kg (180 lb)   LMP 03/19/2024 " "(Approximate)   SpO2 98%   BMI 30.17 kg/m     Estimated body mass index is 30.17 kg/m  as calculated from the following:    Height as of this encounter: 1.645 m (5' 4.76\").    Weight as of this encounter: 81.6 kg (180 lb).  Physical Exam  GENERAL: alert and no distress  NECK: no adenopathy, no asymmetry, masses, or scars  RESP: lungs clear to auscultation - no rales, rhonchi or wheezes  CV: regular rate and rhythm, normal S1 S2, no S3 or S4, no murmur, click or rub, no peripheral edema  ABDOMEN: soft, nontender, no hepatosplenomegaly, no masses and bowel sounds normal  MS: no gross musculoskeletal defects noted, no edema    No results for input(s): \"HGB\", \"PLT\", \"INR\", \"NA\", \"POTASSIUM\", \"CR\", \"A1C\" in the last [id number] hours.     Diagnostics         Revised Cardiac Risk Index (RCRI)  The patient has the following serious cardiovascular risks for perioperative complications:   - No serious cardiac risks = 0 points     RCRI Interpretation: 0 points: Class I (very low risk - 0.4% complication rate)         Signed Electronically by: Trent Coley MD  Copy of this evaluation report is provided to requesting physician.        "

## 2024-04-24 ENCOUNTER — ANESTHESIA EVENT (OUTPATIENT)
Dept: SURGERY | Facility: CLINIC | Age: 24
End: 2024-04-24
Payer: MEDICAID

## 2024-04-26 ENCOUNTER — HOSPITAL ENCOUNTER (OUTPATIENT)
Facility: CLINIC | Age: 24
Discharge: HOME | End: 2024-04-26
Attending: FAMILY MEDICINE | Admitting: FAMILY MEDICINE
Payer: MEDICAID

## 2024-04-26 ENCOUNTER — ANESTHESIA (OUTPATIENT)
Dept: SURGERY | Facility: CLINIC | Age: 24
End: 2024-04-26
Payer: MEDICAID

## 2024-04-26 VITALS
WEIGHT: 180 LBS | TEMPERATURE: 98.1 F | BODY MASS INDEX: 30.17 KG/M2 | DIASTOLIC BLOOD PRESSURE: 86 MMHG | RESPIRATION RATE: 18 BRPM | OXYGEN SATURATION: 100 % | SYSTOLIC BLOOD PRESSURE: 124 MMHG | HEART RATE: 75 BPM

## 2024-04-26 PROCEDURE — 999N000141 HC STATISTIC PRE-PROCEDURE NURSING ASSESSMENT: Performed by: FAMILY MEDICINE

## 2024-04-26 PROCEDURE — 250N000011 HC RX IP 250 OP 636: Performed by: NURSE ANESTHETIST, CERTIFIED REGISTERED

## 2024-04-26 PROCEDURE — 360N000075 HC SURGERY LEVEL 2, PER MIN: Performed by: FAMILY MEDICINE

## 2024-04-26 PROCEDURE — G0145 SCR C/V CYTO,THINLAYER,RESCR: HCPCS | Performed by: FAMILY MEDICINE

## 2024-04-26 PROCEDURE — 370N000017 HC ANESTHESIA TECHNICAL FEE, PER MIN: Performed by: FAMILY MEDICINE

## 2024-04-26 PROCEDURE — 250N000009 HC RX 250: Performed by: NURSE ANESTHETIST, CERTIFIED REGISTERED

## 2024-04-26 PROCEDURE — 258N000003 HC RX IP 258 OP 636: Performed by: NURSE ANESTHETIST, CERTIFIED REGISTERED

## 2024-04-26 PROCEDURE — 710N000012 HC RECOVERY PHASE 2, PER MINUTE: Performed by: FAMILY MEDICINE

## 2024-04-26 RX ORDER — LIDOCAINE HYDROCHLORIDE 10 MG/ML
INJECTION, SOLUTION INFILTRATION; PERINEURAL PRN
Status: DISCONTINUED | OUTPATIENT
Start: 2024-04-26 | End: 2024-04-26

## 2024-04-26 RX ORDER — DIMENHYDRINATE 50 MG/ML
25 INJECTION, SOLUTION INTRAMUSCULAR; INTRAVENOUS
Status: DISCONTINUED | OUTPATIENT
Start: 2024-04-26 | End: 2024-04-26 | Stop reason: HOSPADM

## 2024-04-26 RX ORDER — FENTANYL CITRATE 50 UG/ML
25 INJECTION, SOLUTION INTRAMUSCULAR; INTRAVENOUS
Status: DISCONTINUED | OUTPATIENT
Start: 2024-04-26 | End: 2024-04-26 | Stop reason: HOSPADM

## 2024-04-26 RX ORDER — PROPOFOL 10 MG/ML
INJECTION, EMULSION INTRAVENOUS CONTINUOUS PRN
Status: DISCONTINUED | OUTPATIENT
Start: 2024-04-26 | End: 2024-04-26

## 2024-04-26 RX ORDER — ONDANSETRON 2 MG/ML
4 INJECTION INTRAMUSCULAR; INTRAVENOUS EVERY 30 MIN PRN
Status: DISCONTINUED | OUTPATIENT
Start: 2024-04-26 | End: 2024-04-26 | Stop reason: HOSPADM

## 2024-04-26 RX ORDER — OXYCODONE HYDROCHLORIDE 5 MG/1
10 TABLET ORAL
Status: DISCONTINUED | OUTPATIENT
Start: 2024-04-26 | End: 2024-04-26 | Stop reason: HOSPADM

## 2024-04-26 RX ORDER — KETOROLAC TROMETHAMINE 30 MG/ML
INJECTION, SOLUTION INTRAMUSCULAR; INTRAVENOUS PRN
Status: DISCONTINUED | OUTPATIENT
Start: 2024-04-26 | End: 2024-04-26

## 2024-04-26 RX ORDER — HALOPERIDOL 5 MG/ML
1 INJECTION INTRAMUSCULAR
Status: DISCONTINUED | OUTPATIENT
Start: 2024-04-26 | End: 2024-04-26 | Stop reason: HOSPADM

## 2024-04-26 RX ORDER — SODIUM CHLORIDE, SODIUM LACTATE, POTASSIUM CHLORIDE, CALCIUM CHLORIDE 600; 310; 30; 20 MG/100ML; MG/100ML; MG/100ML; MG/100ML
INJECTION, SOLUTION INTRAVENOUS CONTINUOUS
Status: DISCONTINUED | OUTPATIENT
Start: 2024-04-26 | End: 2024-04-26 | Stop reason: HOSPADM

## 2024-04-26 RX ORDER — FENTANYL CITRATE 50 UG/ML
INJECTION, SOLUTION INTRAMUSCULAR; INTRAVENOUS PRN
Status: DISCONTINUED | OUTPATIENT
Start: 2024-04-26 | End: 2024-04-26

## 2024-04-26 RX ORDER — OXYCODONE HYDROCHLORIDE 5 MG/1
5 TABLET ORAL
Status: DISCONTINUED | OUTPATIENT
Start: 2024-04-26 | End: 2024-04-26 | Stop reason: HOSPADM

## 2024-04-26 RX ORDER — ONDANSETRON 2 MG/ML
INJECTION INTRAMUSCULAR; INTRAVENOUS PRN
Status: DISCONTINUED | OUTPATIENT
Start: 2024-04-26 | End: 2024-04-26

## 2024-04-26 RX ORDER — ONDANSETRON 4 MG/1
4 TABLET, ORALLY DISINTEGRATING ORAL EVERY 30 MIN PRN
Status: DISCONTINUED | OUTPATIENT
Start: 2024-04-26 | End: 2024-04-26 | Stop reason: HOSPADM

## 2024-04-26 RX ORDER — PROPOFOL 10 MG/ML
INJECTION, EMULSION INTRAVENOUS PRN
Status: DISCONTINUED | OUTPATIENT
Start: 2024-04-26 | End: 2024-04-26

## 2024-04-26 RX ORDER — ACETAMINOPHEN 325 MG/1
975 TABLET ORAL EVERY 6 HOURS PRN
Status: DISCONTINUED | OUTPATIENT
Start: 2024-04-26 | End: 2024-04-26 | Stop reason: HOSPADM

## 2024-04-26 RX ORDER — NALOXONE HYDROCHLORIDE 0.4 MG/ML
0.1 INJECTION, SOLUTION INTRAMUSCULAR; INTRAVENOUS; SUBCUTANEOUS
Status: DISCONTINUED | OUTPATIENT
Start: 2024-04-26 | End: 2024-04-26 | Stop reason: HOSPADM

## 2024-04-26 RX ADMIN — FENTANYL CITRATE 25 MCG: 50 INJECTION INTRAMUSCULAR; INTRAVENOUS at 09:39

## 2024-04-26 RX ADMIN — PROPOFOL 20 MG: 10 INJECTION, EMULSION INTRAVENOUS at 09:33

## 2024-04-26 RX ADMIN — KETOROLAC TROMETHAMINE 30 MG: 30 INJECTION, SOLUTION INTRAMUSCULAR at 09:41

## 2024-04-26 RX ADMIN — PROPOFOL 200 MCG/KG/MIN: 10 INJECTION, EMULSION INTRAVENOUS at 09:31

## 2024-04-26 RX ADMIN — PROPOFOL 30 MG: 10 INJECTION, EMULSION INTRAVENOUS at 09:35

## 2024-04-26 RX ADMIN — LIDOCAINE HYDROCHLORIDE 50 MG: 10 INJECTION, SOLUTION INFILTRATION; PERINEURAL at 09:29

## 2024-04-26 RX ADMIN — PROPOFOL 30 MG: 10 INJECTION, EMULSION INTRAVENOUS at 09:38

## 2024-04-26 RX ADMIN — PROPOFOL 50 MG: 10 INJECTION, EMULSION INTRAVENOUS at 09:31

## 2024-04-26 RX ADMIN — PROPOFOL 50 MG: 10 INJECTION, EMULSION INTRAVENOUS at 09:29

## 2024-04-26 RX ADMIN — FENTANYL CITRATE 25 MCG: 50 INJECTION INTRAMUSCULAR; INTRAVENOUS at 09:30

## 2024-04-26 RX ADMIN — SODIUM CHLORIDE, POTASSIUM CHLORIDE, SODIUM LACTATE AND CALCIUM CHLORIDE: 600; 310; 30; 20 INJECTION, SOLUTION INTRAVENOUS at 08:34

## 2024-04-26 RX ADMIN — ONDANSETRON 4 MG: 2 INJECTION INTRAMUSCULAR; INTRAVENOUS at 09:38

## 2024-04-26 RX ADMIN — MIDAZOLAM 1 MG: 1 INJECTION INTRAMUSCULAR; INTRAVENOUS at 09:29

## 2024-04-26 ASSESSMENT — ACTIVITIES OF DAILY LIVING (ADL)
ADLS_ACUITY_SCORE: 35
ADLS_ACUITY_SCORE: 35
ADLS_ACUITY_SCORE: 33

## 2024-04-26 NOTE — ANESTHESIA PREPROCEDURE EVALUATION
Anesthesia Pre-Procedure Evaluation    Patient: Maren Winchester   MRN: [id number] : 2000        Procedure : Procedure(s):  Exam under anesthesia pelvic  PAPANICOLAOU SMEAR          Past Medical History:   Diagnosis Date    ADHD (attention deficit hyperactivity disorder)     [place] diagnosis    Autism     autism spectrum disorder    Hyperthyroidism 2019    Mental retardation     [place] diagnosis      Past Surgical History:   Procedure Laterality Date    RECESSION RESECTION (REPAIR STRABISMUS) Right 2024    Procedure: Eye muscle correction, right eye.;  Surgeon: Geremias Borrego MD;  [place] [facility] [place]      No Known Allergies   Social History     Tobacco Use    Smoking status: Never    Smokeless tobacco: Never   Substance Use Topics    Alcohol use: Never      Wt Readings from Last 1 Encounters:   24 81.6 kg (180 lb)        Anesthesia Evaluation   Pt has had prior anesthetic. Type: MAC and General.    No history of anesthetic complications       ROS/MED HX  ENT/Pulmonary:     (+)           allergic rhinitis,                             Neurologic:     (+)                         Developmental delay,       Cardiovascular:  - neg cardiovascular ROS     METS/Exercise Tolerance: >4 METS    Hematologic:  - neg hematologic  ROS     Musculoskeletal:  - neg musculoskeletal ROS     GI/Hepatic:  - neg GI/hepatic ROS     Renal/Genitourinary:  - neg Renal ROS     Endo:     (+)          thyroid problem, hyperthyroidism,           Psychiatric/Substance Use:     (+) psychiatric history other (comment) and anxiety (Autism spectrum, ADHD)       Infectious Disease:  - neg infectious disease ROS     Malignancy:  - neg malignancy ROS     Other:  - neg other ROS          Physical Exam    Airway  airway exam normal      Mallampati: II   TM distance: > 3 FB   Neck ROM: full   Mouth opening: > 3 cm    Respiratory Devices and Support         Dental  no notable dental history         Cardiovascular    cardiovascular exam normal       Rhythm and rate: regular and normal     Pulmonary   pulmonary exam normal        breath sounds clear to auscultation           OUTSIDE LABS:  CBC:   Lab Results   Component Value Date    WBC 7.1 08/27/2019    WBC 8.3 04/03/2019    HGB 13.8 08/27/2019    HGB 12.7 04/03/2019    HCT 42.3 08/27/2019    HCT 38.7 04/03/2019     08/27/2019     04/03/2019     BMP:   Lab Results   Component Value Date     08/27/2019     01/24/2017    POTASSIUM 4.0 08/27/2019    POTASSIUM 3.8 01/24/2017    CHLORIDE 107 08/27/2019    CHLORIDE 106 01/24/2017    CO2 23 08/27/2019    CO2 30 01/24/2017    BUN 11 08/27/2019    BUN 7 01/24/2017    CR 0.45 (L) 08/27/2019    CR 0.64 01/24/2017    GLC 86 08/27/2019    GLC 93 01/24/2017     COAGS:   Lab Results   Component Value Date    PTT 32 10/22/2012    INR 1.11 10/22/2012     POC:   Lab Results   Component Value Date    HCG Negative 02/19/2024     HEPATIC:   Lab Results   Component Value Date    ALBUMIN 3.0 (L) 08/27/2019    PROTTOTAL 7.6 08/27/2019    ALT 27 08/27/2019    AST 19 08/27/2019    ALKPHOS 87 08/27/2019    BILITOTAL 0.4 08/27/2019     OTHER:   Lab Results   Component Value Date    A1C 5.6 03/23/2015    MAIA 8.7 08/27/2019    LIPASE 107 01/07/2016    TSH 1.37 03/11/2024    T4 1.18 03/11/2024    T3 174 03/11/2024    SED 15 01/07/2016       Anesthesia Plan    ASA Status:  2       Anesthesia Type: MAC.     - Reason for MAC: immobility needed   Induction: Intravenous, Propofol.   Maintenance: Balanced.        Consents          - Extended Intubation/Ventilatory Support Discussed: No.      - Patient is DNR/DNI Status: No     Use of blood products discussed: No .     Postoperative Care    Pain management: IV analgesics, Oral pain medications, Multi-modal analgesia.   PONV prophylaxis: Dexamethasone or Solumedrol, Ondansetron (or other 5HT-3), Background Propofol Infusion     Comments:    Other Comments: The risks and benefits of  "anesthesia, and the alternatives where applicable, have been discussed with the patient, and they wish to proceed.           JOSE Robbins CRNA    I have reviewed the pertinent notes and labs in the chart from the past 30 days and (re)examined the patient.  Any updates or changes from those notes are reflected in this note.              # Obesity: Estimated body mass index is 30.17 kg/m  as calculated from the following:    Height as of 4/19/24: 1.645 m (5' 4.76\").    Weight as of 4/19/24: 81.6 kg (180 lb).      "

## 2024-04-26 NOTE — PROCEDURES
Op note    Date 4/26/2024     Procedure - pelvic under anesthesia with pap    Preop diagnosis - need for cervical cancer screening  Post op diagnosis - same    Findings - normal anatomy.   Specimens - pap smear sent to path  Fluids - 100 IVF  Urine - none  MAC    Procedure -   RBA were explained in the preop area. Mom the guardian was consented. She was taken back to the OR and given adequate MAC anesthesia. Once under, she was placed in dorsal lithotomy and pelvic perfomed with pap obtained. Tolerated very well. Some trace bleeding from the cervic after the pap. Pap sent. Tolerated well. Recovering in post op area without complication.     Trent Coley MD

## 2024-04-26 NOTE — ANESTHESIA CARE TRANSFER NOTE
Patient: Maren Winchester    Procedure: Procedure(s):  Exam under anesthesia pelvic  PAPANICOLAOU SMEAR       Diagnosis: Cognitive impairment [R41.89]  Autism spectrum disorder [F84.0]  Cervical cancer screening [Z12.4]  Diagnosis Additional Information: No value filed.    Anesthesia Type:   MAC     Note:    Oropharynx: oropharynx clear of all foreign objects and spontaneously breathing  Level of Consciousness: drowsy  Oxygen Supplementation: face mask    Independent Airway: airway patency satisfactory and stable  Dentition: dentition unchanged  Vital Signs Stable: post-procedure vital signs reviewed and stable  Report to RN Given: handoff report given  Patient transferred to: Phase II    Handoff Report: Identifed the Patient, Identified the Reponsible Provider, Reviewed the pertinent medical history, Discussed the surgical course, Reviewed Intra-OP anesthesia mangement and issues during anesthesia, Set expectations for post-procedure period and Allowed opportunity for questions and acknowledgement of understanding      Vitals:  Vitals Value Taken Time   /72 04/26/24 0953   Temp     Pulse 71 04/26/24 0953   Resp     SpO2 96 % 04/26/24 0955   Vitals shown include unfiled device data.    Electronically Signed By: JOSE Robbins CRNA  April 26, 2024  9:56 AM

## 2024-04-26 NOTE — H&P
"Pre-Endoscopy History and Physical     Maren Winchester MRN# [id number]   YOB: 2000 Age: 23 year old     Date of Procedure: 4/26/2024  Primary care provider: Trent oCley  Type of Endoscopy: colonoscopy  Type of Anesthesia Anticipated: MAC     HPI:    Maren is a 23 year old female who was referred to me for PEUA. She has never had a pap before and has autism and felt to not tolerate a pelvic in clinic without anesthesia.    Maren is feeling well today. We discussed today's colonoscopy in the pre-op area.    Patient Active Problem List   Diagnosis    Cognitive impairment    Menorrhagia    Gross motor delay    Constipation, unspecified constipation type    VANNESSA (generalized anxiety disorder)    Chronic rhinitis    Autism spectrum disorder    Low TSH level    Fatigue, unspecified type    Hyperthyroidism    Double vision          /82   Pulse 71   Temp 98.1  F (36.7  C) (Temporal)   Resp 18   Wt 81.6 kg (180 lb)   SpO2 97%   BMI 30.17 kg/m     Estimated body mass index is 30.17 kg/m  as calculated from the following:    Height as of 4/19/24: 1.645 m (5' 4.76\").    Weight as of this encounter: 81.6 kg (180 lb).    GENERAL APPEARANCE: alert and oriented and NAD  See anesthesia exam      Assessment/Plan   ASA Class 1 - Healthy patient, no medical problems    Plan for pelvic under anesthesia and pap      Signed Electronically by: Trent Coley MD  April 26, 2024     "

## 2024-04-26 NOTE — ANESTHESIA POSTPROCEDURE EVALUATION
Patient: Maren Winchester    Procedure: Procedure(s):  Exam under anesthesia pelvic  PAPANICOLAOU SMEAR       Anesthesia Type:  MAC    Note:  Disposition: Outpatient   Postop Pain Control: Uneventful            Sign Out: Well controlled pain   PONV: No   Neuro/Psych: Uneventful            Sign Out: Acceptable/Baseline neuro status   Airway/Respiratory: Uneventful            Sign Out: Acceptable/Baseline resp. status   CV/Hemodynamics: Uneventful            Sign Out: Acceptable CV status   Other NRE: NONE   DID A NON-ROUTINE EVENT OCCUR? No    Event details/Postop Comments:  Pt was happy with anesthesia care.  No complications.  I will follow up with the pt if needed.           Last vitals:  Vitals Value Taken Time   /86 04/26/24 1020   Temp     Pulse 75 04/26/24 1020   Resp 18 04/26/24 1020   SpO2 97 % 04/26/24 1021   Vitals shown include unfiled device data.    Electronically Signed By: JOSE Robbins CRNA  April 26, 2024  12:10 PM

## 2024-04-28 ENCOUNTER — MYC REFILL (OUTPATIENT)
Dept: FAMILY MEDICINE | Facility: CLINIC | Age: 24
End: 2024-04-28
Payer: MEDICAID

## 2024-04-28 DIAGNOSIS — K59.00 CONSTIPATION, UNSPECIFIED CONSTIPATION TYPE: ICD-10-CM

## 2024-04-30 LAB
BKR LAB AP GYN ADEQUACY: NORMAL
BKR LAB AP GYN INTERPRETATION: NORMAL
PATH REPORT.COMMENTS IMP SPEC: NORMAL
PATH REPORT.COMMENTS IMP SPEC: NORMAL

## 2024-04-30 RX ORDER — POLYETHYLENE GLYCOL 3350 17 G/17G
17 POWDER, FOR SOLUTION ORAL PRN
Qty: 850 G | Refills: 3 | Status: SHIPPED | OUTPATIENT
Start: 2024-04-30

## 2024-05-21 ENCOUNTER — TELEPHONE (OUTPATIENT)
Dept: SURGERY | Facility: CLINIC | Age: 24
End: 2024-05-21
Payer: MEDICAID

## 2024-05-21 ENCOUNTER — MYC MEDICAL ADVICE (OUTPATIENT)
Dept: OTOLARYNGOLOGY | Facility: CLINIC | Age: 24
End: 2024-05-21
Payer: MEDICAID

## 2024-05-21 NOTE — TELEPHONE ENCOUNTER
Patient is currently scheduled on 08/08/24 with Dr. Menjivar for Graves' Disease. Called patient's mother (consent to communicate on file) and left detailed message offering opening with provider on 05/23/24 at 8:50 AM. Advised parent in voicemail that this appointment time is on a first come first served basis and is not guaranteed. Parent to return call to clinic at [phone] to claim.     05/23/24 8:50 AM time slot with Dr. Menjivar currently on hold.  Justine Ellis, CMA

## 2024-05-22 NOTE — TELEPHONE ENCOUNTER
Patient's parent replied via [employer] declining 05/23/24 appointment with Dr. Menjivar. See MyChart encounter dated 05/21/24. Closing encounter.  Justine Ellis CMA

## 2024-06-04 DIAGNOSIS — F41.1 GAD (GENERALIZED ANXIETY DISORDER): ICD-10-CM

## 2024-06-12 ENCOUNTER — PATIENT OUTREACH (OUTPATIENT)
Dept: CARE COORDINATION | Facility: CLINIC | Age: 24
End: 2024-06-12
Payer: MEDICAID

## 2024-06-12 ENCOUNTER — OFFICE VISIT (OUTPATIENT)
Dept: OPHTHALMOLOGY | Facility: CLINIC | Age: 24
End: 2024-06-12
Attending: OPHTHALMOLOGY
Payer: MEDICAID

## 2024-06-12 DIAGNOSIS — H53.2 DOUBLE VISION: Primary | ICD-10-CM

## 2024-06-12 PROCEDURE — 99213 OFFICE O/P EST LOW 20 MIN: CPT | Mod: GC | Performed by: OPHTHALMOLOGY

## 2024-06-12 PROCEDURE — 92060 SENSORIMOTOR EXAMINATION: CPT | Performed by: OPHTHALMOLOGY

## 2024-06-12 ASSESSMENT — SLIT LAMP EXAM - LIDS
COMMENTS: NORMAL
COMMENTS: NORMAL

## 2024-06-12 ASSESSMENT — VISUAL ACUITY
METHOD: SNELLEN - LINEAR
OS_SC: 20/25
OD_PH_SC: 20/20
OD_PH_SC+: -2
OS_SC+: -2
OS_PH_SC+: -1
OS_PH_SC: 20/20
OD_SC: 20/80

## 2024-06-12 ASSESSMENT — EXTERNAL EXAM - LEFT EYE: OS_EXAM: NORMAL

## 2024-06-12 ASSESSMENT — REFRACTION_MANIFEST
OD_AXIS: 160
OD_SPHERE: -1.75
OS_AXIS: 015
OS_CYLINDER: +0.50
OS_SPHERE: -1.75
OD_CYLINDER: +0.50

## 2024-06-12 ASSESSMENT — REFRACTION_FINALRX
OS_HPRISM: 2
OD_HPRISM: 2
OD_HBASE: BASE OUT
OS_HBASE: BASE OUT

## 2024-06-12 ASSESSMENT — TONOMETRY
OS_IOP_MMHG: 17
IOP_METHOD: ICARE
OD_IOP_MMHG: 16

## 2024-06-12 ASSESSMENT — EXTERNAL EXAM - RIGHT EYE: OD_EXAM: NORMAL

## 2024-06-12 NOTE — PROGRESS NOTES
1. Poorly controlled intermittent exotropia     Patient now s/p Right medial rectus resection 6.0 mm and Right lateral rectus recession 8.0 mm with residual esotropia. She has a well controlled intermittent esotropia of 4 prism diopters in primary gaze and left gaze. In right gaze her esotropia measures larger and in clinic she does have binocular diplopia however no clear history of this at home.    We discussed options of wearing glasses for her uncorrected refractive error versus leaving her blurred in the right eye more than left eye and the patient opted to wear distance glasses. We discussed no prisms versus adding a small amount of prism to expand her window of single binocular vision in right gaze and we all mutually agreed to place the small amount of prism in glasses.    New prescription given today for ground in prism glasses with a small myopic prescription.    Follow-up as needed with neuro-ophthalmology       Historical data including initial visit HPI  Patient has a history of Graves' disease diagnosed in August of 2019. Last thyroid labs on 10/7/23 at visit with endocrinology. On 2.5mg methimazole daily, has not undergone total thyroidectomy.  Seen for her yearly eye exam on 10/11/23 by Dr. Arambula where she was complaining of increase in headaches.     Has not had as much dizziness since the visit with Dr. Arambula but still has been having headaches. Per mother, has been having these headaches for the past 3 years. Describes the headache as a dull aching sensation all around her head. Comes on when she doesn't get enough sleep or if there is too much going on around her. Uses ibuprofen to make it go away. Happens multiple times a week. Mother has a history of migraines. Unable to determine if photophobia or sensitivity to sound occurs during these episodes. Endorses double vision that is worse with distance, mother states that she has noticed her eyes not being aligned since she was young, no  history of patching or strabismus surgery.    Complaining of sharp eye pain that feels like its behind the eyes. States that it happens a couple times a week, does not seem to coincide with her headaches and happens randomly. Nothing makes it worse and the only thing that makes it better is ibuprofen. Has no other vision changes or eye complaints.    Has not had any MRI or CT scans.     Review of outside testing:    10/7/23 TSH, T3, Free T4 all wnl.    12/04/23 TSI: 1.8 (elevated)    Review of outside clinical notes:    10/11/2023 -- Visit with Dr. Arambula      Past medical history:    Patient Active Problem List   Diagnosis    Cognitive impairment    Menorrhagia    Gross motor delay    Constipation, unspecified constipation type    VANNESSA (generalized anxiety disorder)    Chronic rhinitis    Autism spectrum disorder    Low TSH level    Fatigue, unspecified type    Hyperthyroidism    Double vision       Patient has a current medication list which includes the following prescription(s): cholecalciferol, juleber, fluoxetine, methimazole, multiple vitamins-minerals, polyethylene glycol, prednisolone acetate, prednisolone acetate, and vitamin c..     Family history / social history:  Patient has family history of diabetes and migraines.    Patient  reports that she has never smoked. She has never used smokeless tobacco. She reports that she does not drink alcohol and does not use drugs.     Interval hx since last visit:   Patient now s/p Right medial rectus resection 6.0 mm and Right lateral rectus recession 8.0 mm. She mostly denies diplopia at home (perhaps occasionally but not clear) and has not been closing an eye    Exam:  Please see epic chart for complete exam     Tests ordered and interpreted today:  Sensorimotor exam shows a 4 prism diopter well controlled intermittent esotropia in primary gaze and left gaze that increases in right gaze to 12 prism diopters where it becomes poorly controlled.          Sandra  MD Chana   Fellow, Neuro-Ophthalmology     25 minutes were spent on the date of the encounter by me doing chart review, history and exam, documentation, and further activities as noted above    Complete documentation of historical and exam elements from today's encounter can be found in the full encounter summary report (not reduplicated in this progress note).  I personally obtained the chief complaint(s) and history of present illness.  I confirmed and edited as necessary the review of systems, past medical/surgical history, family history, social history, and examination findings as documented by others; and I examined the patient myself.  I personally reviewed the relevant tests, images, and reports as documented above.  I formulated and edited as necessary the assessment and plan and discussed the findings and management plan with the patient and family.  I personally reviewed the ophthalmic test(s) associated with this encounter, agree with the interpretation(s) as documented by the resident/fellow, and have edited the corresponding report(s) as necessary.     Geremias Borrego MD

## 2024-06-12 NOTE — LETTER
2024    RE: Maren Winchester  : 2000  MRN: [id number]    Dear Providers,    I saw our mutual patient, Maren Winchester, in follow-up in my clinic recently.  After a thorough neuro-ophthalmic history and examination, I came to the following conclusions:     1. Poorly controlled intermittent exotropia     Patient now s/p Right medial rectus resection 6.0 mm and Right lateral rectus recession 8.0 mm with residual esotropia. She has a well controlled intermittent esotropia of 4 prism diopters in primary gaze and left gaze. In right gaze her esotropia measures larger and in clinic she does have binocular diplopia however no clear history of this at home.    We discussed options of wearing glasses for her uncorrected refractive error versus leaving her blurred in the right eye more than left eye and the patient opted to wear distance glasses. We discussed no prisms versus adding a small amount of prism to expand her window of single binocular vision in right gaze and we all mutually agreed to place the small amount of prism in glasses.    New prescription given today for ground in prism glasses with a small myopic prescription.    Follow-up as needed with neuro-ophthalmology.      Historical data including initial visit HPI  Patient has a history of Graves' disease diagnosed in 2019. Last thyroid labs on 10/7/23 at visit with endocrinology. On 2.5mg methimazole daily, has not undergone total thyroidectomy.  Seen for her yearly eye exam on 10/11/23 by Dr. Arambula where she was complaining of increase in headaches.     Has not had as much dizziness since the visit with Dr. Arambula but still has been having headaches. Per mother, has been having these headaches for the past 3 years. Describes the headache as a dull aching sensation all around her head. Comes on when she doesn't get enough sleep or if there is too much going on around her. Uses ibuprofen to make it go away. Happens multiple times  a week. Mother has a history of migraines. Unable to determine if photophobia or sensitivity to sound occurs during these episodes. Endorses double vision that is worse with distance, mother states that she has noticed her eyes not being aligned since she was young, no history of patching or strabismus surgery.    Complaining of sharp eye pain that feels like its behind the eyes. States that it happens a couple times a week, does not seem to coincide with her headaches and happens randomly. Nothing makes it worse and the only thing that makes it better is ibuprofen. Has no other vision changes or eye complaints.    Has not had any MRI or CT scans.     Review of outside testing:    10/7/23 TSH, T3, Free T4 all wnl.    12/04/23 TSI: 1.8 (elevated)    Review of outside clinical notes:    10/11/2023 -- Visit with Dr. Arambula      Past medical history:    Patient Active Problem List   Diagnosis    Cognitive impairment    Menorrhagia    Gross motor delay    Constipation, unspecified constipation type    VANNESSA (generalized anxiety disorder)    Chronic rhinitis    Autism spectrum disorder    Low TSH level    Fatigue, unspecified type    Hyperthyroidism    Double vision       Patient has a current medication list which includes the following prescription(s): cholecalciferol, juleber, fluoxetine, methimazole, multiple vitamins-minerals, polyethylene glycol, prednisolone acetate, prednisolone acetate, and vitamin c.     Family history / social history:  Patient has family history of diabetes and migraines.  Patient  reports that she has never smoked. She has never used smokeless tobacco. She reports that she does not drink alcohol and does not use drugs.     Interval hx since last visit:   Patient now s/p Right medial rectus resection 6.0 mm and Right lateral rectus recession 8.0 mm. She mostly denies diplopia at home (perhaps occasionally but not clear) and has not been closing an eye    Exam:  Please see epic chart for  complete exam     Tests ordered and interpreted today:  Sensorimotor exam shows a 4 prism diopter well controlled intermittent esotropia in primary gaze and left gaze that increases in right gaze to 12 prism diopters where it becomes poorly controlled.    For further exam details, please feel free to contact our office for additional records.  If you wish to contact me regarding this patient please email me at [email or web address] or give my clinic a call to arrange a phone conversation.    Sincerely,    Geremias Borrego MD  , Neuro-Ophthalmology and Adult Strabismus Surgery  [facility] [employer] in Neuro-Ophthalmology  Department of Ophthalmology and Visual Neurosciences  [facility]

## 2024-06-26 ENCOUNTER — PATIENT OUTREACH (OUTPATIENT)
Dept: CARE COORDINATION | Facility: CLINIC | Age: 24
End: 2024-06-26
Payer: MEDICAID

## 2024-08-08 ENCOUNTER — VIRTUAL VISIT (OUTPATIENT)
Dept: SURGERY | Facility: CLINIC | Age: 24
End: 2024-08-08
Attending: INTERNAL MEDICINE
Payer: MEDICAID

## 2024-08-08 ENCOUNTER — MYC MEDICAL ADVICE (OUTPATIENT)
Dept: ENDOCRINOLOGY | Facility: CLINIC | Age: 24
End: 2024-08-08

## 2024-08-08 DIAGNOSIS — E05.00 GRAVES' DISEASE: ICD-10-CM

## 2024-08-08 PROCEDURE — 99203 OFFICE O/P NEW LOW 30 MIN: CPT | Mod: 95 | Performed by: SURGERY

## 2024-08-08 NOTE — PROGRESS NOTES
Virtual Visit Details        Originating Location (pt. Location): Home    Distant Location (provider location):  Off-site  Platform used for Video Visit: Cassy Ellis CMA

## 2024-08-08 NOTE — LETTER
8/8/2024      Maren Winchester  [address]  [place] [address]      Dear Colleague,    Thank you for referring your patient, Maren Winchester, to the [facility]. Please see a copy of my visit note below.    Virtual Visit Details        Originating Location (pt. Location): Home    Distant Location (provider location):  Off-site  Platform used for Video Visit: Cassy Ellis CMA      Due to the COVID 19 pandemic this visit was a video visit in order to help prevent spread of infection in this high risk patient and the general population. The patient gave verbal consent for the visit today.    Start time 10:30am  Stop time 10:50am  Total time 20 minutes face to face conversation video   Chart prep , review of images, results , start note prior to appt 10 minutes 10  Total 30 minutes     SURGERY CLINIC CONSULTATION    REASON FOR CONSULTATION:  Maren Winchester was referred by Dr. Vallecillo for evaluation and discussion of treatment options for Graves' disease    This video visit was carried out with the patient's mother present-as the patient is nonverbal autism     HISTORY OF PRESENT ILLNESS:  Maren Winchester is a 23 year old female who has had Graves' disease diagnosed since 2019.  Currently she is well-controlled with methimazole.  The mom feels as if the patient has no current symptoms of hyperthyroidism.  Noted the patient does have diagnosed exophthalmos and is being evaluated in the thyroid eye clinic by Dr. Borrego    Regarding the thyroid goiter.  The mom denies that the patient has any problems with breathing or swallowing.  I cannot assess her voice.  The patient has no trouble taking her pills.  There is a pertinent family history as the mother has had a left thyroid lobectomy.  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        REVIEW OF SYSTEMS:  ROS EXAM: 10 point review of systems  is pertinent for that noted in the HPI  Patient Active Problem List   Diagnosis     Cognitive impairment     Menorrhagia     Gross motor delay     Constipation, unspecified constipation type     VANNESSA (generalized anxiety disorder)     Chronic rhinitis     Autism spectrum disorder     Low TSH level     Fatigue, unspecified type     Hyperthyroidism     Double vision       Past Surgical History:   Procedure Laterality Date     EXAM UNDER ANESTHESIA PELVIC N/A 4/26/2024    Procedure: Exam under anesthesia pelvic;  Surgeon: Trent Coley MD;  [facility]     PAPANICOLAOU SMEAR N/A 4/26/2024    Procedure: PAPANICOLAOU SMEAR;  Surgeon: Trent Coley MD;  Location: [facility]     RECESSION RESECTION (REPAIR STRABISMUS) Right 2/19/2024    Procedure: Eye muscle correction, right eye.;  Surgeon: Geremias Borrego MD;  Location: [facility] OR       No Known Allergies    Medications reviewed in the EMR        Family History   Problem Relation Age of Onset     Anxiety Disorder Mother      Thyroid Disease Mother      Diabetes Maternal Grandmother      Asthma No family hx of      Cerebrovascular Disease No family hx of      Depression No family hx of      Obesity No family hx of           Physical exam could not be performed as this is a video visit.  I personally reviewed the radiographic images and laboratory data  ASSESSMENT:   1. Graves' disease        PLAN:   I discussed with the mother the options of surgery versus radioactive iodine.  I would like to get an ultrasound of the thyroid to determine the size of the goiter.  If I would then discuss with endocrinology as well as ophthalmology if this patient would be candidate for surgery versus radioactive iodine.  Although literature would support patients with Graves' thyroid eye disease benefit from surgery versus radioactive iodine.    Tiffanie Menjivar MD              Again, thank you for allowing me to participate in the care of your patient.         Sincerely,        Tiffanie Menjivar MD

## 2024-08-12 ENCOUNTER — MYC MEDICAL ADVICE (OUTPATIENT)
Dept: FAMILY MEDICINE | Facility: CLINIC | Age: 24
End: 2024-08-12
Payer: MEDICAID

## 2024-08-14 ENCOUNTER — OFFICE VISIT (OUTPATIENT)
Dept: FAMILY MEDICINE | Facility: CLINIC | Age: 24
End: 2024-08-14
Payer: MEDICAID

## 2024-08-14 VITALS
SYSTOLIC BLOOD PRESSURE: 113 MMHG | DIASTOLIC BLOOD PRESSURE: 79 MMHG | OXYGEN SATURATION: 97 % | BODY MASS INDEX: 29.02 KG/M2 | WEIGHT: 174.2 LBS | HEIGHT: 65 IN | TEMPERATURE: 98.2 F | RESPIRATION RATE: 21 BRPM | HEART RATE: 81 BPM

## 2024-08-14 DIAGNOSIS — L98.9 SKIN LESION: Primary | ICD-10-CM

## 2024-08-14 PROCEDURE — G2211 COMPLEX E/M VISIT ADD ON: HCPCS | Performed by: NURSE PRACTITIONER

## 2024-08-14 PROCEDURE — 99213 OFFICE O/P EST LOW 20 MIN: CPT | Performed by: NURSE PRACTITIONER

## 2024-08-14 ASSESSMENT — PAIN SCALES - GENERAL: PAINLEVEL: NO PAIN (0)

## 2024-08-14 NOTE — PROGRESS NOTES
"  Assessment & Plan     Skin lesion  - Adult Dermatology  Referral; Future    Discussed benign appearance of lesion today.   Given she is having symptoms with this and is uncomfortable at times, they would be interested in seeing Dermatology to discuss removal. Referral placed.   Instructed to continue to monitor for skin changes, redness, swelling, pain or drainage.   Follow-up with new or worsening symptoms, questions or concerns.     I explained my diagnostic considerations and recommendations to the patient, who voiced understanding and agreement with the assessment and treatment plan. All questions were answered to patient's apparent satisfaction. We discussed potential side effects of any prescribed or recommended therapies, as well as expectations for response to treatments and importance of lifestyle measures that may improve symptoms. Patient was advised to contact our office if there are new symptoms or no improvement or worsening of conditions or symptoms.    The longitudinal plan of care for the diagnosis(es)/condition(s) as documented were addressed during this visit. Due to the added complexity in care, I will continue to support Maren in the subsequent management and with ongoing continuity of care.    BMI  Estimated body mass index is 29.38 kg/m  as calculated from the following:    Height as of this encounter: 1.64 m (5' 4.57\").    Weight as of this encounter: 79 kg (174 lb 3.2 oz).             Adalberto Engel is a 23 year old, presenting for the following health issues:  Lesion (On hairline and itches )        8/14/2024     3:13 PM   Additional Questions   Roomed by Anna   Accompanied by Bucky Hawthorne     History of Present Illness       Reason for visit:  Bump on hairline  Symptom onset:  More than a month  Symptoms include:  Itching, uncomfortable  Symptom intensity:  Moderate  Symptom progression:  Worsening  Had these symptoms before:  No  What makes it worse:  No  What makes it " better:  No    She eats 2-3 servings of fruits and vegetables daily.She consumes 0 sweetened beverage(s) daily.She exercises with enough effort to increase her heart rate 9 or less minutes per day.    She is taking medications regularly.     Maren presents with her mother today for concerns of a changing skin lesion on her left forehead. She has noticed a lump being present for several weeks now. It is generally flesh colored and about hte size of a pea. She has noticed it will change color, sometimes being darker in color and other times flesh colored. It will occasionally become inflammed, red and tender. She denies any discharge or drainage from the lesion. She denies any fever, chills, crusting, blistering or plaque build up. She has not been applying anything topically.     Patient Active Problem List   Diagnosis    Cognitive impairment    Menorrhagia    Gross motor delay    Constipation, unspecified constipation type    VANNESSA (generalized anxiety disorder)    Chronic rhinitis    Autism spectrum disorder    Low TSH level    Fatigue, unspecified type    Hyperthyroidism    Double vision     Current Outpatient Medications   Medication Sig Dispense Refill    cholecalciferol (VITAMIN D3) 25 mcg (1000 units) capsule Take 1 capsule by mouth daily      desogestrel-ethinyl estradiol (JULEBER) 0.15-30 MG-MCG tablet TAKE ONE TABLET BY MOUTH DAILY, SKIP PLACEBO WEEK EXCEPT FOR EVERY 3RD MONTH 112 tablet 1    FLUoxetine (PROZAC) 20 MG capsule TAKE 1 CAPSULE (20 MG) BY MOUTH DAILY (STOP SERTRALINE) 90 capsule 1    methimazole (TAPAZOLE) 5 MG tablet Take 0.5 tablets (2.5 mg) by mouth daily 45 tablet 3    Multiple Vitamins-Minerals (MULTIVITAL PO) Take  by mouth.      polyethylene glycol (MIRALAX) 17 GM/Dose powder Take 17 g by mouth as needed 850 g 3    vitamin C (ASCORBIC ACID) 500 MG tablet Take 500 mg by mouth daily      prednisoLONE acetate (PRED FORTE) 1 % ophthalmic suspension Instill 1 drop into operative eye(s) four  "times a day.  Do NOT start drops until instructed by Surgeon. 10 mL 0    prednisoLONE acetate (PRED FORTE) 1 % ophthalmic suspension Instill 1 drop into operative eye(s) four times a day.  Do NOT start drops until instructed by Surgeon. 10 mL 0     No current facility-administered medications for this visit.         Review of Systems  Constitutional, HEENT, cardiovascular, pulmonary, gi and gu systems are negative, except as otherwise noted.      Objective    /79   Pulse 81   Temp 98.2  F (36.8  C) (Temporal)   Resp 21   Ht 1.64 m (5' 4.57\")   Wt 79 kg (174 lb 3.2 oz)   LMP 07/15/2024 (Approximate)   SpO2 97%   BMI 29.38 kg/m    Body mass index is 29.38 kg/m .  Physical Exam  Vitals reviewed.   Constitutional:       General: She is not in acute distress.     Appearance: Normal appearance.   HENT:      Head: Normocephalic and atraumatic.   Eyes:      Extraocular Movements: Extraocular movements intact.      Conjunctiva/sclera: Conjunctivae normal.   Pulmonary:      Effort: Pulmonary effort is normal.   Skin:     General: Skin is warm and dry.      Comments: Single, 4-5mm flesh colored papule on left forehead/hairline. No erythema, edema, tenderness, blistering or drainage.    Neurological:      Mental Status: She is alert and oriented to person, place, and time. Mental status is at baseline.   Psychiatric:         Mood and Affect: Mood normal.         Behavior: Behavior normal.                    Signed Electronically by: Elda Rojas NP    "

## 2024-08-15 ENCOUNTER — MYC MEDICAL ADVICE (OUTPATIENT)
Dept: FAMILY MEDICINE | Facility: CLINIC | Age: 24
End: 2024-08-15
Payer: MEDICAID

## 2024-08-24 NOTE — PROGRESS NOTES
Due to the COVID 19 pandemic this visit was a video visit in order to help prevent spread of infection in this high risk patient and the general population. The patient gave verbal consent for the visit today.    Start time 10:30am  Stop time 10:50am  Total time 20 minutes face to face conversation video   Chart prep , review of images, results , start note prior to appt 10 minutes 10  Total 30 minutes     SURGERY CLINIC CONSULTATION    REASON FOR CONSULTATION:  Maren Winchester was referred by Dr. Vallecillo for evaluation and discussion of treatment options for Graves' disease    This video visit was carried out with the patient's mother present-as the patient is nonverbal autism     HISTORY OF PRESENT ILLNESS:  Maren Winchester is a 23 year old female who has had Graves' disease diagnosed since 2019.  Currently she is well-controlled with methimazole.  The mom feels as if the patient has no current symptoms of hyperthyroidism.  Noted the patient does have diagnosed exophthalmos and is being evaluated in the thyroid eye clinic by Dr. Borrego    Regarding the thyroid goiter.  The mom denies that the patient has any problems with breathing or swallowing.  I cannot assess her voice.  The patient has no trouble taking her pills.  There is a pertinent family history as the mother has had a left thyroid lobectomy.  The patient has no previous head neck radiation treatment    A 24 yo female with Graves' disease and Grave's ophthalmopathy        REVIEW OF SYSTEMS:  ROS EXAM: 10 point review of systems is pertinent for that noted in the HPI  Patient Active Problem List   Diagnosis    Cognitive impairment    Menorrhagia    Gross motor delay    Constipation, unspecified constipation type    VANNESSA (generalized anxiety disorder)    Chronic rhinitis    Autism spectrum disorder    Low TSH level    Fatigue, unspecified type    Hyperthyroidism    Double vision       Past Surgical History:   Procedure Laterality Date    EXAM UNDER  ANESTHESIA PELVIC N/A 4/26/2024    Procedure: Exam under anesthesia pelvic;  Surgeon: Trent Coley MD;  [facility]    PAPANICOLAOU SMEAR N/A 4/26/2024    Procedure: PAPANICOLAOU SMEAR;  Surgeon: Trent Coley MD;  [facility]    RECESSION RESECTION (REPAIR STRABISMUS) Right 2/19/2024    Procedure: Eye muscle correction, right eye.;  Surgeon: Geremias Borrego MD;  Location: [facility] OR       No Known Allergies    Medications reviewed in the EMR        Family History   Problem Relation Age of Onset    Anxiety Disorder Mother     Thyroid Disease Mother     Diabetes Maternal Grandmother     Asthma No family hx of     Cerebrovascular Disease No family hx of     Depression No family hx of     Obesity No family hx of           Physical exam could not be performed as this is a video visit.  I personally reviewed the radiographic images and laboratory data  ASSESSMENT:   1. Graves' disease        PLAN:   I discussed with the mother the options of surgery versus radioactive iodine.  I would like to get an ultrasound of the thyroid to determine the size of the goiter.  If I would then discuss with endocrinology as well as ophthalmology if this patient would be candidate for surgery versus radioactive iodine.  Although literature would support patients with Graves' thyroid eye disease benefit from surgery versus radioactive iodine.    Tiffanie Menjivar MD

## 2024-08-27 NOTE — TELEPHONE ENCOUNTER
I should see her, please add on next few weeks, doesn't sound urgent (other than driving her nuts). Ok to add to end of day, or after lunch

## 2024-08-29 ENCOUNTER — PATIENT OUTREACH (OUTPATIENT)
Dept: CARE COORDINATION | Facility: CLINIC | Age: 24
End: 2024-08-29
Payer: MEDICAID

## 2024-08-31 DIAGNOSIS — N92.0 MENORRHAGIA WITH REGULAR CYCLE: ICD-10-CM

## 2024-09-04 RX ORDER — DESOGESTREL AND ETHINYL ESTRADIOL 0.15-0.03
KIT ORAL
Qty: 112 TABLET | Refills: 1 | Status: SHIPPED | OUTPATIENT
Start: 2024-09-04

## 2024-09-04 NOTE — TELEPHONE ENCOUNTER
Dr. Coley- would you be willing to put her on a Wednesday or Friday at 330 in the next few weeks/earlier than 10/4? Otherwise we can advise patient to wait until the scheduled apt on 10/4. Thanks!

## 2024-09-14 ENCOUNTER — HEALTH MAINTENANCE LETTER (OUTPATIENT)
Age: 24
End: 2024-09-14

## 2024-10-04 ENCOUNTER — OFFICE VISIT (OUTPATIENT)
Dept: FAMILY MEDICINE | Facility: CLINIC | Age: 24
End: 2024-10-04
Payer: MEDICAID

## 2024-10-04 VITALS
DIASTOLIC BLOOD PRESSURE: 82 MMHG | WEIGHT: 173.8 LBS | TEMPERATURE: 97.7 F | RESPIRATION RATE: 16 BRPM | OXYGEN SATURATION: 97 % | BODY MASS INDEX: 28.96 KG/M2 | SYSTOLIC BLOOD PRESSURE: 110 MMHG | HEART RATE: 72 BPM | HEIGHT: 65 IN

## 2024-10-04 DIAGNOSIS — Z11.4 SCREENING FOR HIV (HUMAN IMMUNODEFICIENCY VIRUS): Primary | ICD-10-CM

## 2024-10-04 DIAGNOSIS — L72.9 SKIN CYST: ICD-10-CM

## 2024-10-04 DIAGNOSIS — Z11.59 NEED FOR HEPATITIS C SCREENING TEST: ICD-10-CM

## 2024-10-04 PROCEDURE — 99213 OFFICE O/P EST LOW 20 MIN: CPT | Performed by: FAMILY MEDICINE

## 2024-10-04 ASSESSMENT — PAIN SCALES - GENERAL: PAINLEVEL: NO PAIN (0)

## 2024-10-04 NOTE — PROGRESS NOTES
"Assessment & Plan       ICD-10-CM    1. Screening for HIV (human immunodeficiency virus)  Z11.4       2. Need for hepatitis C screening test  Z11.59       3. Skin cyst  L72.9          On exam is benign skin cyst.  Will continue observation for now.  Family let me know in 2 to 4 weeks if it is bothering her more or enlarging.  We may elect to remove it.    No follow-ups on file.    Trent Coley MD  [facility] ISRAEL Engel is a 24 year old, presenting for the following health issues:  Mole and Mass        10/4/2024     3:11 PM   Additional Questions   Roomed by Lili CALVILLO     History of Present Illness       Reason for visit:  Bump that has enlarged upper left hairline  Symptom onset:  More than a month  Symptoms include:  Pain, discomfort  Symptom intensity:  Moderate  Symptom progression:  Staying the same  Had these symptoms before:  Yes  Has tried/received treatment for these symptoms:  No  What makes it worse:  No  What makes it better:  No   She is taking medications regularly.                     Objective    /82   Pulse 72   Temp 97.7  F (36.5  C) (Temporal)   Resp 16   Ht 1.638 m (5' 4.5\")   Wt 78.8 kg (173 lb 12.8 oz)   LMP 09/06/2024 (Approximate)   SpO2 97%   BMI 29.37 kg/m    Body mass index is 29.37 kg/m .  Physical Exam   Well-appearing.  Along the hairline on the left temple area there is a freely mobile, well-defined ovoid subcutaneous mass about 4 to 5 mm in size.  Nontender.  No overlying skin change.          Signed Electronically by: Trent Coley MD    "

## 2024-12-10 DIAGNOSIS — F41.1 GAD (GENERALIZED ANXIETY DISORDER): ICD-10-CM

## 2024-12-11 ENCOUNTER — TELEPHONE (OUTPATIENT)
Dept: FAMILY MEDICINE | Facility: CLINIC | Age: 24
End: 2024-12-11
Payer: MEDICAID

## 2024-12-11 NOTE — TELEPHONE ENCOUNTER
Prior Authorization Retail Medication Request    Medication/Dose: Juleber 0.15-30mg  Diagnosis and ICD code (if different than what is on RX):    New/renewal/insurance change PA/secondary ins. PA:  Previously Tried and Failed:    Rationale:      Insurance   Primary[place] medicaid  Insurance ID:  [id number]    Secondary (if applicable):  Insurance ID:      Pharmacy Information (if different than what is on RX)  Name:  [facility]  Phone:  [phone]  Fax:[phone]    Clinic Information  Preferred routing pool for dept communication:     Ma does not want to pay for continuously. Rejection is stating 1 tablet per day    Thank you,  Juanita Hairston, Pharmacy Technician  [facility]

## 2024-12-14 NOTE — TELEPHONE ENCOUNTER
PA Initiation    Medication: JULEBER 0.15-30 MG-MCG PO TABS  Insurance Company: [employer] - Phone [phone] Fax [phone]  Pharmacy Filling the Rx: [address]   Filling Pharmacy Phone: [phone]  Filling Pharmacy Fax: [phone]  Start Date: 12/14/2024

## 2024-12-16 NOTE — TELEPHONE ENCOUNTER
Prior Authorization Approval    Medication: JULEBER 0.15-30 MG-MCG PO TABS  Authorization Effective Date: 12/14/2024  Authorization Expiration Date: 12/14/2025  Approved Dose/Quantity:   Reference #:     Insurance Company: [employer] Review - Phone [phone] Fax [phone]  Expected CoPay: $    CoPay Card Available:      Financial Assistance Needed:   Which Pharmacy is filling the prescription: [facility], [address]   Pharmacy Notified: YES  Patient Notified: **Instructed pharmacy to notify patient when script is ready to /ship.**

## 2025-01-06 ENCOUNTER — MYC MEDICAL ADVICE (OUTPATIENT)
Dept: DERMATOLOGY | Facility: CLINIC | Age: 25
End: 2025-01-06
Payer: MEDICAID

## 2025-01-06 NOTE — PATIENT INSTRUCTIONS
Wound Care After a Biopsy    What is a skin biopsy?  A skin biopsy allows the doctor to examine a very small piece of tissue under the microscope to determine the diagnosis and the best treatment for the skin condition. A local anesthetic (numbing medicine)  is injected with a very small needle into the skin area to be tested. A small piece of skin is taken from the area. Sometimes a suture (stitch) is used.     What are the risks of a skin biopsy?  I will experience scar, bleeding, swelling, pain, crusting and redness. I may experience incomplete removal or recurrence. Risks of this procedure are excessive bleeding, bruising, infection, nerve damage, numbness, thick (hypertrophic or keloidal) scar and non-diagnostic biopsy.    How should I care for my wound for the first 24 hours?  Keep the wound dry and covered for 24 hours  If it bleeds, hold direct pressure on the area for 15 minutes. If bleeding does not stop then go to the emergency room  Avoid strenuous exercise the first 1-2 days or as your doctor instructs you    How should I care for the wound after 24 hours?  After 24 hours, remove the bandage  You may bathe or shower as normal  If you had a scalp biopsy, you can shampoo as usual and can use shower water to clean the biopsy site daily  Clean the wound twice a day with gentle soap and water  Do not scrub, be gentle  Apply white petroleum/Vaseline after cleaning the wound with a cotton swab or a clean finger, and keep the site covered with a Bandaid /bandage. Bandages are not necessary with a scalp biopsy  If you are unable to cover the site with a Bandaid /bandage, re-apply ointment 2-3 times a day to keep the site moist. Moisture will help with healing  Avoid strenuous activity for first 1-2 days  Avoid lakes, rivers, pools, and oceans until the stitches are removed or the site is healed    How do I clean my wound?  Wash hands thoroughly with soap or use hand  before all wound care  Clean the  wound with gentle soap and water  Apply white petroleum/Vaseline  to wound after it is clean  Replace the Bandaid /bandage to keep the wound covered for the first few days or as instructed by your doctor  If you had a scalp biopsy, warm shower water to the area on a daily basis should suffice    What should I use to clean my wound?   Cotton-tipped applicators (Qtips )  White petroleum jelly (Vaseline ). Use a clean new container and use Q-tips to apply.  Bandaids   as needed  Gentle soap     How should I care for my wound long term?  Do not get your wound dirty  Keep up with wound care for one week or until the area is healed.  A small scab will form and fall off by itself when the area is completely healed. The area will be red and will become pink in color as it heals. Sun protection is very important for how your scar will turn out. Sunscreen with an SPF 30 or greater is recommended once the area is healed.  If you have stitches, stitches need to be removed in 7 days on the face/neck, or 10-14 days on the body days. You may return to our clinic for this or you may have it done locally at your doctor s office.  You should have some soreness but it should be mild and slowly go away over several days. Talk to your doctor about using tylenol for pain,    When should I call my doctor?  If you have increased:   Pain or swelling  Pus or drainage (clear or slightly yellow drainage is ok)  Temperature over 100F  Spreading redness or warmth around wound    When will I hear about my results?  The biopsy results can take 2 weeks to come back.  Your results will automatically release to [employer] before your provider has even reviewed them.  The clinic will call you with the results, send you a [employer] message, or have you schedule a follow-up clinic or phone time to discuss the results.  Contact our clinics if you do not hear from us in 2 weeks. If further treatment is needed for a skin cancer, this will be done at either our  [place] or [place] office.    Who should I call with questions?  [facility]: [phone]  [facility]: [phone]  For urgent needs outside of business hours call the [facility] at [phone] and ask for the dermatology resident on call       Patient Education       Proper skin care from [facility]:    -Eliminate harsh soaps as they strip the natural oils from the skin, often resulting in dry itchy skin ( i.e. Dial, Zest, [place])  -Use mild soaps such as Cetaphil or Dove Sensitive Skin in the shower. You do not need to use soap on arms, legs, and trunk every time you shower unless visibly soiled.   -Avoid hot or cold showers.  -After showering, lightly dry off and apply moisturizing within 2-3 minutes. This will help trap moisture in the skin.   -Aggressive use of a moisturizer at least 1-2 times a day to the entire body (including -Vanicream, Cetaphil, Aquaphor or Cerave) and moisturize hands after every washing.  -We recommend using moisturizers that come in a tub that needs to be scooped out, not a pump. This has more of an oil base. It will hold moisture in your skin much better than a water base moisturizer. The above recommended are non-pore clogging.      Wear a sunscreen with at least SPF 30 on your face, ears, neck and V of the chest daily. Wear sunscreen on other areas of the body if those areas are exposed to the sun throughout the day. Sunscreens can contain physical and/or chemical blockers. Physical blockers are less likely to clog pores, these include zinc oxide and titanium dioxide. Reapply every two hour and after swimming.     Sunscreen examples: https://www.ewg.org/sunscreen/    UV radiation  UVA radiation remains constant throughout the day and throughout the year. It is a longer wavelength than UVB and therefore penetrates deeper into the skin leading to immediate and delayed tanning, photoaging,  and skin cancer. 70-80% of UVA and UVB radiation occurs between the hours of 10am-2pm.  UVB radiation  UVB radiation causes the most harmful effects and is more significant during the summer months. However, snow and ice can reflect UVB radiation leading to skin damage during the winter months as well. UVB radiation is responsible for tanning, burning, inflammation, delayed erythema (pinkness), pigmentation (brown spots), and skin cancer.     I recommend self monthly full body exams and yearly full body exams with a dermatology provider. If you develop a new or changing lesion please follow up for examination. Most skin cancers are pink and scaly or pink and pearly. However, we do see blue/brown/black skin cancers.  Consider the ABCDEs of melanoma when giving yourself your monthly full body exam ( don't forget the groin, buttocks, feet, toes, etc). A-asymmetry, B-borders, C-color, D-diameter, E-elevation or evolving. If you see any of these changes please follow up in clinic. If you cannot see your back I recommend purchasing a hand held mirror to use with a larger wall mirror.       Checking for Skin Cancer  You can find cancer early by checking your skin each month. There are 3 kinds of skin cancer. They are melanoma, basal cell carcinoma, and squamous cell carcinoma. Doing monthly skin checks is the best way to find new marks or skin changes. Follow the instructions below for checking your skin.   The ABCDEs of checking moles for melanoma   Check your moles or growths for signs of melanoma using ABCDE:   Asymmetry: the sides of the mole or growth don t match  Border: the edges are ragged, notched, or blurred  Color: the color within the mole or growth varies  Diameter: the mole or growth is larger than 6 mm (size of a pencil eraser)  Evolving: the size, shape, or color of the mole or growth is changing (evolving is not shown in the images below)    Checking for other types of skin cancer  Basal cell carcinoma or  squamous cell carcinoma have symptoms such as:     A spot or mole that looks different from all other marks on your skin  Changes in how an area feels, such as itching, tenderness, or pain  Changes in the skin's surface, such as oozing, bleeding, or scaliness  A sore that does not heal  New swelling or redness beyond the border of a mole    Who s at risk?  Anyone can get skin cancer. But you are at greater risk if you have:   Fair skin, light-colored hair, or light-colored eyes  Many moles or abnormal moles on your skin  A history of sunburns from sunlight or tanning beds  A family history of skin cancer  A history of exposure to radiation or chemicals  A weakened immune system  If you have had skin cancer in the past, you are at risk for recurring skin cancer.   How to check your skin  Do your monthly skin checkups in front of a full-length mirror. Check all parts of your body, including your:   Head (ears, face, neck, and scalp)  Torso (front, back, and sides)  Arms (tops, undersides, upper, and lower armpits)  Hands (palms, backs, and fingers, including under the nails)  Buttocks and genitals  Legs (front, back, and sides)  Feet (tops, soles, toes, including under the nails, and between toes)  If you have a lot of moles, take digital photos of them each month. Make sure to take photos both up close and from a distance. These can help you see if any moles change over time.   Most skin changes are not cancer. But if you see any changes in your skin, call your doctor right away. Only he or she can diagnose a problem. If you have skin cancer, seeing your doctor can be the first step toward getting the treatment that could save your life.   [employer] last reviewed this educational content on 4/1/2019 2000-2020 [employer]. [address] [facility], [place] [address]. All rights reserved. This information is not intended as a substitute for professional medical care. Always follow your healthcare  professional's instructions.       When should I call my doctor?  If you are worsening or not improving, please, contact us or seek urgent care as noted below.     Who should I call with questions (adults)?  [facility] (adult and pediatric): [phone]  [facility] (adult): [phone]  [facility] ([facility] and [place]) [phone]  For urgent needs outside of business hours call the [facility] at [phone] and ask for the dermatology resident on call to be paged  If this is a medical emergency and you are unable to reach an ER, Call [phone]      If you need a prescription refill, please contact your pharmacy. Refills are approved or denied by our Physicians during normal business hours, Monday through Fridays  Per office policy, refills will not be granted if you have not been seen within the past year (or sooner depending on your child's condition The ABCDEs of Melanoma    Skin cancer can develop anywhere on the skin. Ask someone for help when checking your skin, especially in hard to see places. If you notice a mole different from others, or that changes, enlarges, itches, or bleeds (even if it is small), you should see a dermatologist.

## 2025-01-06 NOTE — PROGRESS NOTES
[facility] Dermatology Note  Encounter Date: Jan 16, 2025  Office Visit     Reviewed patients past medical history and pertinent chart review prior to patients visit today.     Dermatology Problem List:  0. NUB left temporal scalp, punch biopsy 01/16/25 .    Family Hx: no family history of skin cancer  Personal Hx: no personal history of skin cancer  ____________________________________________    Assessment & Plan:     # Neoplasm of uncertain behavior:  left temporal scalp  DDx includes inflamed cyst vs vascular lesion vs other. Punch biopsy today.    Punch biopsy:  After discussion of benefits and risks including but not limited to bleeding/bruising, pain/swelling, infection, scar, incomplete removal, nerve damage/numbness, recurrence, and non-diagnostic biopsy. verbal consent and photographs were obtained. Time-out was performed. The area was cleaned with isopropyl alcohol. 0.5mL of 1% lidocaine with epinephrine was injected to obtain adequate anesthesia of the lesion. A 8 mm punch biopsy was performed.  4-0 monocryl and 5-0 fast gut were utilized to approximate the epidermal edges. White petroleum jelly/VaselineTM and a bandage was applied to the wound.  Patient to continue with Vaseline to biopsy site once daily until sutures dissolved.  Explicit verbal and written wound care instructions were provided.  The patient left the Dermatology Clinic in good condition. The patient was counseled that sutures should dissolve on their own.        Follow up: PRN pending biopsy results     Dixie Webster PA-C  [facility]  Dermatology    _______________________________________    CC: Derm Problem (Spot on left side of head- has had for about a year, it is growing in size, changing in color (a bit darker). PCP believes it may be a cyst. )    HPI:  Ms. Maren Winchester is a(n) 24 year old female who presents today as a new patient for evaluation of a lesion of concern on the left temporal scalp.  This lesion is new over the past year and can be tender and pruritic. She is wondering about removal. Her mother was present for the visit.     Patient is otherwise feeling well, without additional skin concerns.      Physical Exam:  SKIN: Focused examination of left temporal scalp was performed.  - NUB, left temporal scalp, firm, mobile subcutaneous nodule with overlying blue hue      - No other lesions of concern on areas examined.     Medications:  Current Outpatient Medications   Medication Sig Dispense Refill    cholecalciferol (VITAMIN D3) 25 mcg (1000 units) capsule Take 1 capsule by mouth daily      FLUoxetine (PROZAC) 20 MG capsule TAKE 1 CAPSULE (20 MG) BY MOUTH DAILY (STOP SERTRALINE) 90 capsule 3    JULEBER 0.15-30 MG-MCG tablet TAKE ONE TABLET BY MOUTH DAILY, SKIP PLACEBO WEEK EXCEPT FOR EVERY 3RD MONTH 112 tablet 1    methimazole (TAPAZOLE) 5 MG tablet Take 0.5 tablets (2.5 mg) by mouth daily 45 tablet 3    Multiple Vitamins-Minerals (MULTIVITAMIN GUMMIES ADULT PO) Take by mouth.      polyethylene glycol (MIRALAX) 17 GM/Dose powder Take 17 g by mouth as needed 850 g 3    vitamin C (ASCORBIC ACID) 500 MG tablet Take 500 mg by mouth daily       No current facility-administered medications for this visit.      Past Medical History:   Patient Active Problem List   Diagnosis    Cognitive impairment    Menorrhagia    Gross motor delay    Constipation, unspecified constipation type    VANNESSA (generalized anxiety disorder)    Chronic rhinitis    Autism spectrum disorder    Low TSH level    Fatigue, unspecified type    Hyperthyroidism    Double vision     Past Medical History:   Diagnosis Date    ADHD (attention deficit hyperactivity disorder)     [place] diagnosis    Autism     autism spectrum disorder    Hyperthyroidism 08/26/2019    Mental retardation     [place] diagnosis       CC Elda Rojas, NP  [address] [place]  [place] [address] on close of this encounter.

## 2025-01-08 ENCOUNTER — PATIENT OUTREACH (OUTPATIENT)
Dept: CARE COORDINATION | Facility: CLINIC | Age: 25
End: 2025-01-08
Payer: MEDICAID

## 2025-01-16 ENCOUNTER — OFFICE VISIT (OUTPATIENT)
Dept: DERMATOLOGY | Facility: CLINIC | Age: 25
End: 2025-01-16
Attending: NURSE PRACTITIONER
Payer: MEDICAID

## 2025-01-16 DIAGNOSIS — D48.5 NEOPLASM OF UNCERTAIN BEHAVIOR OF SKIN: ICD-10-CM

## 2025-01-16 ASSESSMENT — PAIN SCALES - GENERAL: PAINLEVEL_OUTOF10: NO PAIN (0)

## 2025-01-16 NOTE — LETTER
1/16/2025      Maren Winchester  [address] [facility] [address]      Dear Colleague,    Thank you for referring your patient, Maren Winchester, to the [facility]. Please see a copy of my visit note below.    [facility] Dermatology Note  Encounter Date: Jan 16, 2025  Office Visit     Reviewed patients past medical history and pertinent chart review prior to patients visit today.     Dermatology Problem List:  0. NUB left temporal scalp, punch biopsy 01/16/25 .    Family Hx: no family history of skin cancer  Personal Hx: no personal history of skin cancer  ____________________________________________    Assessment & Plan:     # Neoplasm of uncertain behavior:  left temporal scalp  DDx includes inflamed cyst vs vascular lesion vs other. Punch biopsy today.    Punch biopsy:  After discussion of benefits and risks including but not limited to bleeding/bruising, pain/swelling, infection, scar, incomplete removal, nerve damage/numbness, recurrence, and non-diagnostic biopsy. verbal consent and photographs were obtained. Time-out was performed. The area was cleaned with isopropyl alcohol. 0.5mL of 1% lidocaine with epinephrine was injected to obtain adequate anesthesia of the lesion. A 8 mm punch biopsy was performed.  4-0 monocryl and 5-0 fast gut were utilized to approximate the epidermal edges. White petroleum jelly/VaselineTM and a bandage was applied to the wound.  Patient to continue with Vaseline to biopsy site once daily until sutures dissolved.  Explicit verbal and written wound care instructions were provided.  The patient left the Dermatology Clinic in good condition. The patient was counseled that sutures should dissolve on their own.        Follow up: PRN pending biopsy results     Dixie Webster PA-C  [facility]  Dermatology    _______________________________________    CC: Derm Problem (Spot on left side of head- has had for about a year, it is  growing in size, changing in color (a bit darker). PCP believes it may be a cyst. )    HPI:  Ms. Maren Winchester is a(n) 24 year old female who presents today as a new patient for evaluation of a lesion of concern on the left temporal scalp. This lesion is new over the past year and can be tender and pruritic. She is wondering about removal. Her mother was present for the visit.     Patient is otherwise feeling well, without additional skin concerns.      Physical Exam:  SKIN: Focused examination of left temporal scalp was performed.  - NUB, left temporal scalp, firm, mobile subcutaneous nodule with overlying blue hue      - No other lesions of concern on areas examined.     Medications:  Current Outpatient Medications   Medication Sig Dispense Refill     cholecalciferol (VITAMIN D3) 25 mcg (1000 units) capsule Take 1 capsule by mouth daily       FLUoxetine (PROZAC) 20 MG capsule TAKE 1 CAPSULE (20 MG) BY MOUTH DAILY (STOP SERTRALINE) 90 capsule 3     JULEBER 0.15-30 MG-MCG tablet TAKE ONE TABLET BY MOUTH DAILY, SKIP PLACEBO WEEK EXCEPT FOR EVERY 3RD MONTH 112 tablet 1     methimazole (TAPAZOLE) 5 MG tablet Take 0.5 tablets (2.5 mg) by mouth daily 45 tablet 3     Multiple Vitamins-Minerals (MULTIVITAMIN GUMMIES ADULT PO) Take by mouth.       polyethylene glycol (MIRALAX) 17 GM/Dose powder Take 17 g by mouth as needed 850 g 3     vitamin C (ASCORBIC ACID) 500 MG tablet Take 500 mg by mouth daily       No current facility-administered medications for this visit.      Past Medical History:   Patient Active Problem List   Diagnosis     Cognitive impairment     Menorrhagia     Gross motor delay     Constipation, unspecified constipation type     VANNESSA (generalized anxiety disorder)     Chronic rhinitis     Autism spectrum disorder     Low TSH level     Fatigue, unspecified type     Hyperthyroidism     Double vision     Past Medical History:   Diagnosis Date     ADHD (attention deficit hyperactivity disorder)     [place]  diagnosis     Autism     autism spectrum disorder     Hyperthyroidism 08/26/2019     Mental retardation     [place] diagnosis       CC Elda Rojas, NP  [address] [place] [address] on close of this encounter.       Again, thank you for allowing me to participate in the care of your patient.        Sincerely,        Dixie Webster PA-C    Electronically signed

## 2025-01-16 NOTE — NURSING NOTE
Maren Winchester's chief complaint for this visit includes:  Chief Complaint   Patient presents with    Derm Problem     Spot on left side of head- has had for about a year, it is growing in size, changing in color (a bit darker). PCP believes it may be a cyst.      PCP: Trent Coley    Referring Provider:  Elda Rojas, BINH  [address] [place] [address]    There were no vitals taken for this visit.  No Pain (0)      No Known Allergies      Do you need any medication refills at today's visit?   No.          The following medication was given:     MEDICATION:  Lidocaine with epinephrine 1% [id number]  ROUTE: SQ  SITE: see procedure note  DOSE: 0.5 mL  LOT #: [id number]  : [employer]  EXPIRATION DATE: 06-  NDC#: 63921-093-65  Was there drug waste? Yes, 0.5 mL  Multi-dose vial: Yes    Padma Ashton RN  January 16, 2025

## 2025-01-18 LAB
PATH REPORT.COMMENTS IMP SPEC: NORMAL
PATH REPORT.COMMENTS IMP SPEC: NORMAL
PATH REPORT.FINAL DX SPEC: NORMAL
PATH REPORT.GROSS SPEC: NORMAL
PATH REPORT.MICROSCOPIC SPEC OTHER STN: NORMAL
PATH REPORT.RELEVANT HX SPEC: NORMAL

## 2025-03-01 DIAGNOSIS — N92.0 MENORRHAGIA WITH REGULAR CYCLE: ICD-10-CM

## 2025-03-03 RX ORDER — DESOGESTREL AND ETHINYL ESTRADIOL 0.15-0.03
KIT ORAL
Qty: 112 TABLET | Refills: 1 | Status: SHIPPED | OUTPATIENT
Start: 2025-03-03

## 2025-04-08 ENCOUNTER — DOCUMENTATION ONLY (OUTPATIENT)
Dept: LAB | Facility: CLINIC | Age: 25
End: 2025-04-08
Payer: MEDICAID

## 2025-04-08 NOTE — PROGRESS NOTES
Maren Winchester has an upcoming lab appointment:    Future Appointments   Date Time Provider Department [place]   [facility] 10[facility]   20[facility] 10:00 AM [facility] [place]     Patient is scheduled for the following lab(s): Standing Thyroid labs have  and need to be reordered before they can be collected. Thank you,     Raiza Mancilla

## 2025-04-13 ENCOUNTER — LAB (OUTPATIENT)
Dept: LAB | Facility: CLINIC | Age: 25
End: 2025-04-13
Payer: MEDICAID

## 2025-04-13 DIAGNOSIS — Z11.4 SCREENING FOR HIV (HUMAN IMMUNODEFICIENCY VIRUS): ICD-10-CM

## 2025-04-13 DIAGNOSIS — Z11.59 NEED FOR HEPATITIS C SCREENING TEST: ICD-10-CM

## 2025-04-13 DIAGNOSIS — E05.00 GRAVES' DISEASE: ICD-10-CM

## 2025-04-13 LAB
HCV AB SERPL QL IA: NONREACTIVE
HIV 1+2 AB+HIV1 P24 AG SERPL QL IA: NONREACTIVE
T3 SERPL-MCNC: 199 NG/DL (ref 85–202)
T4 FREE SERPL-MCNC: 1.06 NG/DL (ref 0.9–1.7)
TSH SERPL DL<=0.005 MIU/L-ACNC: 2.74 UIU/ML (ref 0.3–4.2)

## 2025-04-13 PROCEDURE — 84443 ASSAY THYROID STIM HORMONE: CPT

## 2025-04-13 PROCEDURE — 36415 COLL VENOUS BLD VENIPUNCTURE: CPT

## 2025-04-13 PROCEDURE — 84439 ASSAY OF FREE THYROXINE: CPT

## 2025-04-13 PROCEDURE — 84480 ASSAY TRIIODOTHYRONINE (T3): CPT

## 2025-04-13 PROCEDURE — 87389 HIV-1 AG W/HIV-1&-2 AB AG IA: CPT

## 2025-04-13 PROCEDURE — 86803 HEPATITIS C AB TEST: CPT

## 2025-04-13 PROCEDURE — 84445 ASSAY OF TSI GLOBULIN: CPT | Mod: 90

## 2025-04-13 PROCEDURE — 83520 IMMUNOASSAY QUANT NOS NONAB: CPT | Mod: 90

## 2025-04-15 LAB — TSH RECEP AB SER-ACNC: 1.24 IU/L (ref 0–1.75)

## 2025-04-23 LAB — TSI SER-ACNC: 1.8 TSI INDEX

## 2025-07-15 ENCOUNTER — TELEPHONE (OUTPATIENT)
Dept: OPHTHALMOLOGY | Facility: CLINIC | Age: 25
End: 2025-07-15
Payer: MEDICAID

## 2025-07-31 ENCOUNTER — LAB (OUTPATIENT)
Dept: LAB | Facility: CLINIC | Age: 25
End: 2025-07-31
Payer: MEDICAID

## 2025-07-31 DIAGNOSIS — E05.00 GRAVES' DISEASE: Primary | ICD-10-CM

## 2025-07-31 LAB
T3 SERPL-MCNC: 193 NG/DL (ref 85–202)
T4 FREE SERPL-MCNC: 1.06 NG/DL (ref 0.9–1.7)
TSH SERPL DL<=0.005 MIU/L-ACNC: 3.03 UIU/ML (ref 0.3–4.2)

## 2025-08-01 ENCOUNTER — VIRTUAL VISIT (OUTPATIENT)
Dept: ENDOCRINOLOGY | Facility: CLINIC | Age: 25
End: 2025-08-01
Payer: MEDICAID

## 2025-08-01 DIAGNOSIS — E05.00 GRAVES' OPHTHALMOPATHY: ICD-10-CM

## 2025-08-01 DIAGNOSIS — E05.00 GRAVES' DISEASE: Primary | ICD-10-CM

## 2025-08-01 PROCEDURE — 1126F AMNT PAIN NOTED NONE PRSNT: CPT | Mod: 95 | Performed by: INTERNAL MEDICINE

## 2025-08-01 PROCEDURE — 98006 SYNCH AUDIO-VIDEO EST MOD 30: CPT | Performed by: INTERNAL MEDICINE

## 2025-08-06 LAB — TSI SER-ACNC: 1 TSI INDEX

## 2025-08-27 DIAGNOSIS — N92.0 MENORRHAGIA WITH REGULAR CYCLE: ICD-10-CM

## 2025-08-27 RX ORDER — DESOGESTREL AND ETHINYL ESTRADIOL 0.15-0.03
KIT ORAL
Qty: 112 TABLET | Refills: 0 | Status: SHIPPED | OUTPATIENT
Start: 2025-08-27

## 2025-09-03 ENCOUNTER — HOSPITAL ENCOUNTER (OUTPATIENT)
Dept: ULTRASOUND IMAGING | Facility: CLINIC | Age: 25
Discharge: HOME | End: 2025-09-03
Attending: INTERNAL MEDICINE
Payer: MEDICAID

## 2025-09-03 DIAGNOSIS — E05.00 GRAVES' DISEASE: ICD-10-CM

## 2025-09-03 PROCEDURE — 76536 US EXAM OF HEAD AND NECK: CPT

## (undated) DEVICE — LUBRICATING JELLY 4.25OZ

## (undated) DEVICE — SU VICRYL 6-0 S-29 12" J556G

## (undated) DEVICE — GLOVE BIOGEL PI MICRO SZ 7.5 48575

## (undated) DEVICE — LINEN TOWEL PACK X5 5464

## (undated) DEVICE — PACK MINOR EYE CUSTOM ASC

## (undated) DEVICE — SU PLAIN 6-0 G-1DA 18" 770G

## (undated) DEVICE — BRUSH CYTOLOGY SOFT 8"

## (undated) DEVICE — SOL WATER IRRIG 500ML BOTTLE 2F7113

## (undated) DEVICE — SU VICRYL 6-0 S-14DA 18" UND J670G

## (undated) DEVICE — DRAPE STERI TOWEL LG 1010

## (undated) DEVICE — EYE PREP BETADINE 5% SOLUTION 30ML 0065-0411-30

## (undated) DEVICE — EYE MARKING PAD 581057

## (undated) RX ORDER — DEXAMETHASONE SODIUM PHOSPHATE 4 MG/ML
INJECTION, SOLUTION INTRA-ARTICULAR; INTRALESIONAL; INTRAMUSCULAR; INTRAVENOUS; SOFT TISSUE
Status: DISPENSED
Start: 2024-02-19

## (undated) RX ORDER — PROPOFOL 10 MG/ML
INJECTION, EMULSION INTRAVENOUS
Status: DISPENSED
Start: 2024-02-19

## (undated) RX ORDER — GABAPENTIN 300 MG/1
CAPSULE ORAL
Status: DISPENSED
Start: 2024-02-19

## (undated) RX ORDER — FENTANYL CITRATE 50 UG/ML
INJECTION, SOLUTION INTRAMUSCULAR; INTRAVENOUS
Status: DISPENSED
Start: 2024-04-26

## (undated) RX ORDER — ONDANSETRON 2 MG/ML
INJECTION INTRAMUSCULAR; INTRAVENOUS
Status: DISPENSED
Start: 2024-04-26

## (undated) RX ORDER — FENTANYL CITRATE 50 UG/ML
INJECTION, SOLUTION INTRAMUSCULAR; INTRAVENOUS
Status: DISPENSED
Start: 2024-02-19

## (undated) RX ORDER — ACETAMINOPHEN 325 MG/1
TABLET ORAL
Status: DISPENSED
Start: 2024-02-19

## (undated) RX ORDER — KETOROLAC TROMETHAMINE 30 MG/ML
INJECTION, SOLUTION INTRAMUSCULAR; INTRAVENOUS
Status: DISPENSED
Start: 2024-04-26

## (undated) RX ORDER — LIDOCAINE HYDROCHLORIDE 10 MG/ML
INJECTION, SOLUTION EPIDURAL; INFILTRATION; INTRACAUDAL; PERINEURAL
Status: DISPENSED
Start: 2024-04-26

## (undated) RX ORDER — ONDANSETRON 2 MG/ML
INJECTION INTRAMUSCULAR; INTRAVENOUS
Status: DISPENSED
Start: 2024-02-19

## (undated) RX ORDER — KETOROLAC TROMETHAMINE 30 MG/ML
INJECTION, SOLUTION INTRAMUSCULAR; INTRAVENOUS
Status: DISPENSED
Start: 2024-02-19